# Patient Record
Sex: FEMALE | Race: WHITE | Employment: FULL TIME | URBAN - METROPOLITAN AREA
[De-identification: names, ages, dates, MRNs, and addresses within clinical notes are randomized per-mention and may not be internally consistent; named-entity substitution may affect disease eponyms.]

---

## 2017-05-02 ENCOUNTER — EMPLOYEE WELLNESS (OUTPATIENT)
Dept: OTHER | Age: 52
End: 2017-05-02

## 2017-05-02 LAB
CHOLESTEROL, TOTAL: 182 MG/DL (ref 0–199)
GLUCOSE BLD-MCNC: 73 MG/DL (ref 74–107)
HDLC SERPL-MCNC: 69 MG/DL
LDL CHOLESTEROL CALCULATED: 102 MG/DL (ref 0–99)
TRIGL SERPL-MCNC: 55 MG/DL (ref 0–149)

## 2018-03-20 VITALS — BODY MASS INDEX: 27.64 KG/M2 | WEIGHT: 161 LBS

## 2018-03-23 ENCOUNTER — HOSPITAL ENCOUNTER (OUTPATIENT)
Age: 53
Discharge: HOME | End: 2018-03-23
Payer: COMMERCIAL

## 2018-03-23 LAB
ALBUMIN SERPL-MCNC: 4.4 G/DL (ref 3.5–5.2)
ALP BLD-CCNC: 44 U/L (ref 35–104)
ALT SERPL-CCNC: 22 U/L (ref 0–32)
ANION GAP SERPL CALCULATED.3IONS-SCNC: 12 MMOL/L (ref 7–16)
AST SERPL-CCNC: 25 U/L (ref 0–31)
BASOPHILS ABSOLUTE: 0.07 E9/L (ref 0–0.2)
BASOPHILS RELATIVE PERCENT: 1.9 % (ref 0–2)
BILIRUB SERPL-MCNC: 0.4 MG/DL (ref 0–1.2)
BUN BLDV-MCNC: 14 MG/DL (ref 6–20)
CALCIUM SERPL-MCNC: 9.8 MG/DL (ref 8.6–10.2)
CHLORIDE BLD-SCNC: 102 MMOL/L (ref 98–107)
CHOLESTEROL, TOTAL: 179 MG/DL (ref 0–199)
CO2: 28 MMOL/L (ref 22–29)
CREAT SERPL-MCNC: 0.9 MG/DL (ref 0.5–1)
EOSINOPHILS ABSOLUTE: 0.27 E9/L (ref 0.05–0.5)
EOSINOPHILS RELATIVE PERCENT: 7.4 % (ref 0–6)
GFR AFRICAN AMERICAN: >60
GFR NON-AFRICAN AMERICAN: >60 ML/MIN/1.73
GLUCOSE BLD-MCNC: 90 MG/DL (ref 74–109)
HBA1C MFR BLD: 5 % (ref 4.8–5.9)
HCT VFR BLD CALC: 37.2 % (ref 34–48)
HDLC SERPL-MCNC: 72 MG/DL
HEMOGLOBIN: 12.4 G/DL (ref 11.5–15.5)
IMMATURE GRANULOCYTES #: 0.01 E9/L
IMMATURE GRANULOCYTES %: 0.3 % (ref 0–5)
LDL CHOLESTEROL CALCULATED: 98 MG/DL (ref 0–99)
LYMPHOCYTES ABSOLUTE: 1 E9/L (ref 1.5–4)
LYMPHOCYTES RELATIVE PERCENT: 27.2 % (ref 20–42)
MCH RBC QN AUTO: 32.2 PG (ref 26–35)
MCHC RBC AUTO-ENTMCNC: 33.3 % (ref 32–34.5)
MCV RBC AUTO: 96.6 FL (ref 80–99.9)
MONOCYTES ABSOLUTE: 0.32 E9/L (ref 0.1–0.95)
MONOCYTES RELATIVE PERCENT: 8.7 % (ref 2–12)
NEUTROPHILS ABSOLUTE: 2 E9/L (ref 1.8–7.3)
NEUTROPHILS RELATIVE PERCENT: 54.5 % (ref 43–80)
PDW BLD-RTO: 12.1 FL (ref 11.5–15)
PLATELET # BLD: 194 E9/L (ref 130–450)
PMV BLD AUTO: 8.9 FL (ref 7–12)
POTASSIUM SERPL-SCNC: 4.5 MMOL/L (ref 3.5–5)
RBC # BLD: 3.85 E12/L (ref 3.5–5.5)
SODIUM BLD-SCNC: 142 MMOL/L (ref 132–146)
T4 FREE: 1.3 NG/DL (ref 0.93–1.7)
TOTAL PROTEIN: 7.3 G/DL (ref 6.4–8.3)
TRIGL SERPL-MCNC: 46 MG/DL (ref 0–149)
TSH SERPL DL<=0.05 MIU/L-ACNC: 0.97 UIU/ML (ref 0.27–4.2)
VLDLC SERPL CALC-MCNC: 9 MG/DL
WBC # BLD: 3.7 E9/L (ref 4.5–11.5)

## 2018-03-23 PROCEDURE — 80061 LIPID PANEL: CPT

## 2018-03-23 PROCEDURE — 84439 ASSAY OF FREE THYROXINE: CPT

## 2018-03-23 PROCEDURE — 84443 ASSAY THYROID STIM HORMONE: CPT

## 2018-03-23 PROCEDURE — 80053 COMPREHEN METABOLIC PANEL: CPT

## 2018-03-23 PROCEDURE — 83036 HEMOGLOBIN GLYCOSYLATED A1C: CPT

## 2018-03-23 PROCEDURE — 85025 COMPLETE CBC W/AUTO DIFF WBC: CPT

## 2018-03-23 PROCEDURE — 36415 COLL VENOUS BLD VENIPUNCTURE: CPT

## 2018-03-27 ENCOUNTER — EMPLOYEE WELLNESS (OUTPATIENT)
Dept: OTHER | Age: 53
End: 2018-03-27

## 2018-03-27 LAB
CHOLESTEROL, TOTAL: 185 MG/DL (ref 0–199)
GLUCOSE BLD-MCNC: 59 MG/DL (ref 74–107)
HDLC SERPL-MCNC: 68 MG/DL
LDL CHOLESTEROL CALCULATED: 107 MG/DL (ref 0–99)
TRIGL SERPL-MCNC: 51 MG/DL (ref 0–149)

## 2018-04-02 VITALS — WEIGHT: 168 LBS | BODY MASS INDEX: 28.84 KG/M2

## 2018-04-06 ENCOUNTER — HOSPITAL ENCOUNTER (OUTPATIENT)
Age: 53
Discharge: HOME | End: 2018-04-08
Payer: COMMERCIAL

## 2018-04-06 PROCEDURE — 88305 TISSUE EXAM BY PATHOLOGIST: CPT

## 2018-06-07 ENCOUNTER — HOSPITAL ENCOUNTER (OUTPATIENT)
Age: 53
Discharge: HOME | End: 2018-06-07
Payer: COMMERCIAL

## 2018-06-07 LAB
ALBUMIN SERPL-MCNC: 4.2 G/DL (ref 3.5–5.2)
ALP BLD-CCNC: 44 U/L (ref 35–104)
ALT SERPL-CCNC: 25 U/L (ref 0–32)
AST SERPL-CCNC: 28 U/L (ref 0–31)
BILIRUB SERPL-MCNC: 0.5 MG/DL (ref 0–1.2)
BILIRUBIN DIRECT: 0.2 MG/DL (ref 0–0.3)
BILIRUBIN, INDIRECT: 0.3 MG/DL (ref 0–1)
TOTAL PROTEIN: 7 G/DL (ref 6.4–8.3)

## 2018-06-07 PROCEDURE — 36415 COLL VENOUS BLD VENIPUNCTURE: CPT

## 2018-06-07 PROCEDURE — 80076 HEPATIC FUNCTION PANEL: CPT

## 2018-06-12 ENCOUNTER — HOSPITAL ENCOUNTER (OUTPATIENT)
Dept: NUCLEAR MEDICINE | Age: 53
Discharge: HOME | End: 2018-06-14
Payer: COMMERCIAL

## 2018-06-12 VITALS — BODY MASS INDEX: 28.32 KG/M2 | WEIGHT: 165 LBS

## 2018-06-12 DIAGNOSIS — K44.9 HIATAL HERNIA: ICD-10-CM

## 2018-06-12 DIAGNOSIS — K21.9 GASTROESOPHAGEAL REFLUX DISEASE, ESOPHAGITIS PRESENCE NOT SPECIFIED: ICD-10-CM

## 2018-06-12 PROCEDURE — 2580000003 HC RX 258: Performed by: INTERNAL MEDICINE

## 2018-06-12 PROCEDURE — 3430000000 HC RX DIAGNOSTIC RADIOPHARMACEUTICAL: Performed by: RADIOLOGY

## 2018-06-12 PROCEDURE — 78227 HEPATOBIL SYST IMAGE W/DRUG: CPT

## 2018-06-12 PROCEDURE — 6360000004 HC RX CONTRAST MEDICATION: Performed by: INTERNAL MEDICINE

## 2018-06-12 PROCEDURE — A9537 TC99M MEBROFENIN: HCPCS | Performed by: RADIOLOGY

## 2018-06-12 RX ADMIN — MEBROFENIN 8.5 MILLICURIE: 45 INJECTION, POWDER, LYOPHILIZED, FOR SOLUTION INTRAVENOUS at 08:34

## 2018-06-12 RX ADMIN — SODIUM CHLORIDE 1.5 MCG: 9 INJECTION, SOLUTION INTRAVENOUS at 09:14

## 2018-06-29 RX ORDER — ESOMEPRAZOLE MAGNESIUM 40 MG/1
40 FOR SUSPENSION ORAL DAILY
COMMUNITY
End: 2019-07-16 | Stop reason: SDUPTHER

## 2018-07-11 ENCOUNTER — PREP FOR PROCEDURE (OUTPATIENT)
Dept: GASTROENTEROLOGY | Age: 53
End: 2018-07-11

## 2018-07-11 RX ORDER — 0.9 % SODIUM CHLORIDE 0.9 %
50 INTRAVENOUS SOLUTION INTRAVENOUS ONCE
Status: CANCELLED | OUTPATIENT
Start: 2018-07-11 | End: 2018-07-11

## 2018-07-13 ENCOUNTER — HOSPITAL ENCOUNTER (OUTPATIENT)
Age: 53
Setting detail: OUTPATIENT SURGERY
Discharge: HOME | End: 2018-07-13
Attending: INTERNAL MEDICINE | Admitting: INTERNAL MEDICINE
Payer: COMMERCIAL

## 2018-07-13 ENCOUNTER — ANESTHESIA (OUTPATIENT)
Dept: ENDOSCOPY | Age: 53
End: 2018-07-13
Payer: COMMERCIAL

## 2018-07-13 ENCOUNTER — ANESTHESIA EVENT (OUTPATIENT)
Dept: ENDOSCOPY | Age: 53
End: 2018-07-13
Payer: COMMERCIAL

## 2018-07-13 VITALS
OXYGEN SATURATION: 97 % | BODY MASS INDEX: 28 KG/M2 | DIASTOLIC BLOOD PRESSURE: 61 MMHG | WEIGHT: 164 LBS | HEIGHT: 64 IN | SYSTOLIC BLOOD PRESSURE: 107 MMHG | HEART RATE: 60 BPM | TEMPERATURE: 97.7 F | RESPIRATION RATE: 18 BRPM

## 2018-07-13 VITALS
OXYGEN SATURATION: 96 % | RESPIRATION RATE: 36 BRPM | DIASTOLIC BLOOD PRESSURE: 67 MMHG | SYSTOLIC BLOOD PRESSURE: 117 MMHG

## 2018-07-13 PROCEDURE — 3700000000 HC ANESTHESIA ATTENDED CARE: Performed by: INTERNAL MEDICINE

## 2018-07-13 PROCEDURE — 2580000003 HC RX 258: Performed by: INTERNAL MEDICINE

## 2018-07-13 PROCEDURE — 7100000010 HC PHASE II RECOVERY - FIRST 15 MIN: Performed by: INTERNAL MEDICINE

## 2018-07-13 PROCEDURE — 7100000011 HC PHASE II RECOVERY - ADDTL 15 MIN: Performed by: INTERNAL MEDICINE

## 2018-07-13 PROCEDURE — 3609012400 HC EGD TRANSORAL BIOPSY SINGLE/MULTIPLE: Performed by: INTERNAL MEDICINE

## 2018-07-13 PROCEDURE — 88305 TISSUE EXAM BY PATHOLOGIST: CPT

## 2018-07-13 PROCEDURE — 6360000002 HC RX W HCPCS: Performed by: NURSE ANESTHETIST, CERTIFIED REGISTERED

## 2018-07-13 PROCEDURE — 2580000003 HC RX 258: Performed by: NURSE ANESTHETIST, CERTIFIED REGISTERED

## 2018-07-13 PROCEDURE — 87081 CULTURE SCREEN ONLY: CPT

## 2018-07-13 PROCEDURE — 3700000001 HC ADD 15 MINUTES (ANESTHESIA): Performed by: INTERNAL MEDICINE

## 2018-07-13 RX ORDER — PROPOFOL 10 MG/ML
INJECTION, EMULSION INTRAVENOUS PRN
Status: DISCONTINUED | OUTPATIENT
Start: 2018-07-13 | End: 2018-07-13 | Stop reason: SDUPTHER

## 2018-07-13 RX ORDER — 0.9 % SODIUM CHLORIDE 0.9 %
10 VIAL (ML) INJECTION EVERY 12 HOURS SCHEDULED
Status: DISCONTINUED | OUTPATIENT
Start: 2018-07-13 | End: 2018-07-13 | Stop reason: HOSPADM

## 2018-07-13 RX ORDER — SODIUM CHLORIDE 9 MG/ML
INJECTION, SOLUTION INTRAVENOUS CONTINUOUS PRN
Status: DISCONTINUED | OUTPATIENT
Start: 2018-07-13 | End: 2018-07-13 | Stop reason: SDUPTHER

## 2018-07-13 RX ORDER — 0.9 % SODIUM CHLORIDE 0.9 %
50 INTRAVENOUS SOLUTION INTRAVENOUS ONCE
Status: COMPLETED | OUTPATIENT
Start: 2018-07-13 | End: 2018-07-13

## 2018-07-13 RX ORDER — 0.9 % SODIUM CHLORIDE 0.9 %
10 VIAL (ML) INJECTION PRN
Status: DISCONTINUED | OUTPATIENT
Start: 2018-07-13 | End: 2018-07-13 | Stop reason: HOSPADM

## 2018-07-13 RX ADMIN — PROPOFOL 120 MG: 10 INJECTION, EMULSION INTRAVENOUS at 13:56

## 2018-07-13 RX ADMIN — SODIUM CHLORIDE: 9 INJECTION, SOLUTION INTRAVENOUS at 13:48

## 2018-07-13 RX ADMIN — SODIUM CHLORIDE 50 ML: 9 INJECTION, SOLUTION INTRAVENOUS at 13:15

## 2018-07-13 ASSESSMENT — PAIN DESCRIPTION - ORIENTATION
ORIENTATION: INNER

## 2018-07-13 ASSESSMENT — PAIN DESCRIPTION - PAIN TYPE
TYPE: ACUTE PAIN

## 2018-07-13 ASSESSMENT — PAIN DESCRIPTION - LOCATION
LOCATION: ABDOMEN;THROAT
LOCATION: THROAT;ABDOMEN
LOCATION: ABDOMEN;THROAT

## 2018-07-13 ASSESSMENT — PAIN - FUNCTIONAL ASSESSMENT: PAIN_FUNCTIONAL_ASSESSMENT: 0-10

## 2018-07-13 ASSESSMENT — PAIN SCALES - GENERAL
PAINLEVEL_OUTOF10: 0

## 2018-07-14 LAB — CLOTEST: NORMAL

## 2018-07-14 NOTE — OP NOTE
procedure was  terminated by withdrawing the scope and conducting a second look on the way  out, which was essentially the same. The patient tolerated the procedure well.         Nereyda Hatfield MD    D: 07/13/2018 16:12:00       T: 07/13/2018 22:44:44     [id number]  Job#: [id number]     Doc#: [id number]    CC:  Tiffanie Jackson MD       [place]

## 2018-07-16 NOTE — H&P
Gastroenterology      Pre-operative History and Physical      HISTORY OF PRESENT ILLNESS:   Brandon Esparza is seen for a follow-up visit. Pt reports after eating certain foods she has epigastric abd pain that radiates across her upper and and into her back. Patient states when she eats certain foods, she is getting epigastric pain. States she is not eating fried foods, except for abraham. She states the pain does radiate through to her back. Reports last colonoscopy was in 2013, noted. States she is still currently gluten free, and states her nails & hair has improved since doing this. Plan of care reviewed with patient, all questions answered. Patient denies melena, hematochezia, hematemesis. HISTORY:   Past Medical History:   Diagnosis Date    Anemia     Anxiety     Celiac disease     Difficult intubation     Intubated successfully 1/2009 with Glidescope #3, 7.0 ETT.  Eating disorder 10 years ago    anorexia, Dr. Redmond Jarvis monitoring.  History of blood transfusion 1993.     Hypoglycemia     Hypothyroidism     Latex allergy, contact dermatitis     PONV (postoperative nausea and vomiting)     TMJ (dislocation of temporomandibular joint)     Vitamin D deficiency     follows with Dr. Carpenter Popheidy:    Family History   Problem Relation Age of Onset    High Blood Pressure Mother     High Cholesterol Mother     Diabetes Father     Obesity Father     Thyroid Disease Father     Stroke Father     High Cholesterol Father     High Blood Pressure Father     Heart Disease Father     Kidney Disease Father     Thyroid Disease Sister     Celiac Disease Sister     Thyroid Disease Brother     Diabetes Brother     High Blood Pressure Brother     Heart Disease Maternal Grandmother     High Blood Pressure Maternal Grandmother     Diabetes Maternal Grandmother     Diabetes Maternal Grandfather     High Blood Pressure Maternal Grandfather     Thyroid Disease Paternal Grandmother     Heart Disease Paternal Grandfather     Diabetes Maternal Aunt     Diabetes Maternal Uncle        MEDICATIONS:    Current Outpatient Prescriptions:     esomeprazole Magnesium (NEXIUM) 40 MG PACK, Take 40 mg by mouth daily Instructed to take am of procedure, Disp: , Rfl:     levothyroxine (SYNTHROID) 75 MCG tablet, Take 75 mcg by mouth Daily. , Disp: , Rfl:     Cholecalciferol (VITAMIN D-3) 5000 UNITS TABS, Take by mouth daily Instructed to hold, Disp: , Rfl:     FLUoxetine (PROZAC) 10 MG tablet, Take 10 mg by mouth every evening , Disp: , Rfl:     ALLERGIES:  Latex    PHYSICAL EXAM/GENERAL APPEARANCE:     Constitutional: Appearance: well-developed, appropriate grooming, in no acute distress. . Communication: conversation appropriate. Skin: Inspection: no rashes, ulcers, icterus or other lesions. Palpation: no induration or subcutaneous nodules. Head/face: Inspection: atraumatic and normocephalic. Eyes: Conjunctivae/lids: lids normal, anicteric sclerae, moist conjunctivae. Pupils/irises: PERRLA. ENMT: External: normal external inspection of the nose and ears, atraumautic. Hearing: within normal limits. Lips/teeth/gums: normal oral mucosa, lips and gums; good dentition, adequate oral hygiene without masses. Oropharynx: normal tongue, hard and soft palate; posterior pharynx without erythema, exudate or lesions. Neck: Neck: normal motion and central trachea. Thyroid: normal size, consistency and position; no masses or tenderness. Respiratory: Effort: normal respiratory effort and no intercostal retractions. Auscultation: normal breath sounds, no rubs, wheezes or rhonchi. Chest: Inspection: symetrical without visualized masses. Cardiovascular: Palpation: normal size, PMI is palpable in the 5th intercostal space, left midclavicular line, normal rythym. Auscultation: normal, S1 and S2, no gallops , no rubs or murmurs . Peripheral: no edema, varicosities or cyanosis.  Gastrointestinal/Abdomen: Abdomen: normal consistency, epigastric tenderness to palpitation with no guarding or rebound. No masses, normal bowel sounds. Liver/Spleen: normal, normal size, not palpable . Musculoskeletal: Gait/station: normal and should be adequate to undergo exercise testing and/or participation in exercise programs. Digits/nails: no clubbing, cyanosis, petechiae or other inflammatory conditions. Extremities: RLE: no cyanosis, clubbing, or edema. Normal peripheral pulses. Clerance Riis LLE: no cyanosis, clubbing or edema. Normal peripheral pulses. . Digits/Nails: no clubbing, cyanosis, inflammation, petechiae, ischemia, infection or Osler's nodes. Psychiatric: Judgment/insight: normal judgement, normal insight. Orientation: oriented to time, space and person. Memory: normal short term memory, normal long term memory, no memory loss. Mood and affect: no evidence of depression, anxiety or agitation.     OTHER SIGNIFICANT FINDINGS: None    IMPRESSION/INITIAL DIAGNOSIS:   GERD  Hemorrhoids  Hiatal hernia  Celiac disease - egd 4/12/17 - remission on gluten free diet  Epigastric pain          Electronically signed by Myriam Ku MD on 7/16/2018 at 8:17 AM

## 2018-07-18 NOTE — ANESTHESIA POSTPROCEDURE EVALUATION
Department of Anesthesiology  Postprocedure Note    Patient: Giovanni Hicks  MRN: [id number]  YOB: 1965  Date of evaluation: 7/18/2018  Time:  1:20 PM     Procedure Summary     Date:  07/13/18 Room / Location:  [facility] / [facility] ENDOSCOPY    Anesthesia Start:  1354 Anesthesia Stop:  8703    Procedure:  EGD BIOPSY (N/A ) Diagnosis:  (GERD,HIATAL HERNIA,CELIAC DISEASE, EPIGSTRIC PAIN)    Surgeon:  Claribel Woods MD Responsible Provider:  Wilder Cartagena MD    Anesthesia Type:  MAC ASA Status:  3          Anesthesia Type: MAC    Soni Phase I: Soni Score: 10    Soni Phase II: Soni Score: 10    Last vitals: Reviewed and per EMR flowsheets.        Anesthesia Post Evaluation    Patient location during evaluation: PACU  Patient participation: complete - patient participated  Level of consciousness: awake and alert  Airway patency: patent  Nausea & Vomiting: no vomiting and no nausea  Complications: no  Cardiovascular status: blood pressure returned to baseline  Respiratory status: acceptable  Hydration status: euvolemic

## 2018-10-05 ENCOUNTER — HOSPITAL ENCOUNTER (OUTPATIENT)
Age: 53
Discharge: HOME | End: 2018-10-07
Payer: COMMERCIAL

## 2018-10-05 PROCEDURE — 87624 HPV HI-RISK TYP POOLED RSLT: CPT

## 2018-10-05 PROCEDURE — 88175 CYTOPATH C/V AUTO FLUID REDO: CPT

## 2018-10-18 LAB
CORRESPONDING PAP CASE #: NORMAL
HPV, HIGH RISK: NEGATIVE

## 2018-10-22 ENCOUNTER — HOSPITAL ENCOUNTER (OUTPATIENT)
Dept: GENERAL RADIOLOGY | Age: 53
Discharge: HOME | End: 2018-10-24
Payer: COMMERCIAL

## 2018-10-22 DIAGNOSIS — Z12.31 VISIT FOR SCREENING MAMMOGRAM: ICD-10-CM

## 2018-10-22 PROCEDURE — 77063 BREAST TOMOSYNTHESIS BI: CPT

## 2018-10-26 ENCOUNTER — HOSPITAL ENCOUNTER (OUTPATIENT)
Age: 53
Discharge: HOME | End: 2018-10-26
Payer: COMMERCIAL

## 2018-10-26 LAB
ALBUMIN SERPL-MCNC: 4.2 G/DL (ref 3.5–5.2)
ALP BLD-CCNC: 50 U/L (ref 35–104)
ALT SERPL-CCNC: 28 U/L (ref 0–32)
ANION GAP SERPL CALCULATED.3IONS-SCNC: 10 MMOL/L (ref 7–16)
AST SERPL-CCNC: 29 U/L (ref 0–31)
BASOPHILS ABSOLUTE: 0.03 E9/L (ref 0–0.2)
BASOPHILS RELATIVE PERCENT: 0.8 % (ref 0–2)
BILIRUB SERPL-MCNC: 0.5 MG/DL (ref 0–1.2)
BUN BLDV-MCNC: 12 MG/DL (ref 6–20)
CALCIUM SERPL-MCNC: 9.6 MG/DL (ref 8.6–10.2)
CHLORIDE BLD-SCNC: 101 MMOL/L (ref 98–107)
CHOLESTEROL, TOTAL: 194 MG/DL (ref 0–199)
CO2: 27 MMOL/L (ref 22–29)
CREAT SERPL-MCNC: 0.9 MG/DL (ref 0.5–1)
EOSINOPHILS ABSOLUTE: 0.17 E9/L (ref 0.05–0.5)
EOSINOPHILS RELATIVE PERCENT: 4.5 % (ref 0–6)
GFR AFRICAN AMERICAN: >60
GFR NON-AFRICAN AMERICAN: >60 ML/MIN/1.73
GLUCOSE BLD-MCNC: 92 MG/DL (ref 74–109)
HCT VFR BLD CALC: 36 % (ref 34–48)
HDLC SERPL-MCNC: 71 MG/DL
HEMOGLOBIN: 11.7 G/DL (ref 11.5–15.5)
IMMATURE GRANULOCYTES #: 0.01 E9/L
IMMATURE GRANULOCYTES %: 0.3 % (ref 0–5)
LDL CHOLESTEROL CALCULATED: 112 MG/DL (ref 0–99)
LYMPHOCYTES ABSOLUTE: 0.91 E9/L (ref 1.5–4)
LYMPHOCYTES RELATIVE PERCENT: 24.3 % (ref 20–42)
MCH RBC QN AUTO: 31.5 PG (ref 26–35)
MCHC RBC AUTO-ENTMCNC: 32.5 % (ref 32–34.5)
MCV RBC AUTO: 97 FL (ref 80–99.9)
MONOCYTES ABSOLUTE: 0.34 E9/L (ref 0.1–0.95)
MONOCYTES RELATIVE PERCENT: 9.1 % (ref 2–12)
NEUTROPHILS ABSOLUTE: 2.28 E9/L (ref 1.8–7.3)
NEUTROPHILS RELATIVE PERCENT: 61 % (ref 43–80)
PDW BLD-RTO: 12.2 FL (ref 11.5–15)
PLATELET # BLD: 212 E9/L (ref 130–450)
PMV BLD AUTO: 9.3 FL (ref 7–12)
POTASSIUM SERPL-SCNC: 4.5 MMOL/L (ref 3.5–5)
RBC # BLD: 3.71 E12/L (ref 3.5–5.5)
SODIUM BLD-SCNC: 138 MMOL/L (ref 132–146)
T4 FREE: 1.42 NG/DL (ref 0.93–1.7)
TOTAL PROTEIN: 7 G/DL (ref 6.4–8.3)
TRIGL SERPL-MCNC: 56 MG/DL (ref 0–149)
TSH SERPL DL<=0.05 MIU/L-ACNC: 0.79 UIU/ML (ref 0.27–4.2)
VLDLC SERPL CALC-MCNC: 11 MG/DL
WBC # BLD: 3.7 E9/L (ref 4.5–11.5)

## 2018-10-26 PROCEDURE — 36415 COLL VENOUS BLD VENIPUNCTURE: CPT

## 2018-10-26 PROCEDURE — 80061 LIPID PANEL: CPT

## 2018-10-26 PROCEDURE — 85025 COMPLETE CBC W/AUTO DIFF WBC: CPT

## 2018-10-26 PROCEDURE — 84443 ASSAY THYROID STIM HORMONE: CPT

## 2018-10-26 PROCEDURE — 80053 COMPREHEN METABOLIC PANEL: CPT

## 2018-10-26 PROCEDURE — 84439 ASSAY OF FREE THYROXINE: CPT

## 2019-01-16 ENCOUNTER — APPOINTMENT (OUTPATIENT)
Dept: GENERAL RADIOLOGY | Age: 54
End: 2019-01-16
Payer: COMMERCIAL

## 2019-01-16 ENCOUNTER — ANESTHESIA (OUTPATIENT)
Dept: OPERATING ROOM | Age: 54
End: 2019-01-16
Payer: COMMERCIAL

## 2019-01-16 ENCOUNTER — HOSPITAL ENCOUNTER (EMERGENCY)
Age: 54
Discharge: HOME | End: 2019-01-16
Attending: EMERGENCY MEDICINE
Payer: COMMERCIAL

## 2019-01-16 ENCOUNTER — ANESTHESIA EVENT (OUTPATIENT)
Dept: OPERATING ROOM | Age: 54
End: 2019-01-16
Payer: COMMERCIAL

## 2019-01-16 VITALS
HEIGHT: 64 IN | TEMPERATURE: 97.8 F | HEART RATE: 68 BPM | OXYGEN SATURATION: 100 % | RESPIRATION RATE: 18 BRPM | WEIGHT: 161 LBS | SYSTOLIC BLOOD PRESSURE: 121 MMHG | DIASTOLIC BLOOD PRESSURE: 64 MMHG | BODY MASS INDEX: 27.49 KG/M2

## 2019-01-16 VITALS
TEMPERATURE: 97.2 F | RESPIRATION RATE: 11 BRPM | SYSTOLIC BLOOD PRESSURE: 128 MMHG | DIASTOLIC BLOOD PRESSURE: 71 MMHG | OXYGEN SATURATION: 100 %

## 2019-01-16 DIAGNOSIS — S62.102A CLOSED FRACTURE OF LEFT WRIST, INITIAL ENCOUNTER: Primary | ICD-10-CM

## 2019-01-16 LAB
ABO/RH: NORMAL
ALBUMIN SERPL-MCNC: 4.6 G/DL (ref 3.5–5.2)
ALP BLD-CCNC: 47 U/L (ref 35–104)
ALT SERPL-CCNC: 12 U/L (ref 0–32)
ANION GAP SERPL CALCULATED.3IONS-SCNC: 13 MMOL/L (ref 7–16)
ANTIBODY SCREEN: NORMAL
APTT: 27.9 SEC (ref 24.5–35.1)
AST SERPL-CCNC: 23 U/L (ref 0–31)
BILIRUB SERPL-MCNC: 0.5 MG/DL (ref 0–1.2)
BUN BLDV-MCNC: 13 MG/DL (ref 6–20)
CALCIUM SERPL-MCNC: 10.1 MG/DL (ref 8.6–10.2)
CHLORIDE BLD-SCNC: 101 MMOL/L (ref 98–107)
CO2: 26 MMOL/L (ref 22–29)
CREAT SERPL-MCNC: 0.8 MG/DL (ref 0.5–1)
EKG ATRIAL RATE: 64 BPM
EKG P AXIS: 54 DEGREES
EKG P-R INTERVAL: 162 MS
EKG Q-T INTERVAL: 414 MS
EKG QRS DURATION: 90 MS
EKG QTC CALCULATION (BAZETT): 427 MS
EKG R AXIS: 15 DEGREES
EKG T AXIS: 52 DEGREES
EKG VENTRICULAR RATE: 64 BPM
GFR AFRICAN AMERICAN: >60
GFR NON-AFRICAN AMERICAN: >60 ML/MIN/1.73
GLUCOSE BLD-MCNC: 101 MG/DL (ref 74–99)
HCT VFR BLD CALC: 36.3 % (ref 34–48)
HEMOGLOBIN: 12.1 G/DL (ref 11.5–15.5)
INR BLD: 1
MCH RBC QN AUTO: 32.1 PG (ref 26–35)
MCHC RBC AUTO-ENTMCNC: 33.3 % (ref 32–34.5)
MCV RBC AUTO: 96.3 FL (ref 80–99.9)
PDW BLD-RTO: 12 FL (ref 11.5–15)
PLATELET # BLD: 218 E9/L (ref 130–450)
PMV BLD AUTO: 9.2 FL (ref 7–12)
POTASSIUM SERPL-SCNC: 4.4 MMOL/L (ref 3.5–5)
PROTHROMBIN TIME: 11.9 SEC (ref 9.3–12.4)
RBC # BLD: 3.77 E12/L (ref 3.5–5.5)
SODIUM BLD-SCNC: 140 MMOL/L (ref 132–146)
TOTAL PROTEIN: 7.2 G/DL (ref 6.4–8.3)
WBC # BLD: 6.5 E9/L (ref 4.5–11.5)

## 2019-01-16 PROCEDURE — 2580000003 HC RX 258

## 2019-01-16 PROCEDURE — 3209999900 FLUORO FOR SURGICAL PROCEDURES

## 2019-01-16 PROCEDURE — 85610 PROTHROMBIN TIME: CPT

## 2019-01-16 PROCEDURE — 85027 COMPLETE CBC AUTOMATED: CPT

## 2019-01-16 PROCEDURE — 6370000000 HC RX 637 (ALT 250 FOR IP): Performed by: PHYSICIAN ASSISTANT

## 2019-01-16 PROCEDURE — 6370000000 HC RX 637 (ALT 250 FOR IP): Performed by: ORTHOPAEDIC SURGERY

## 2019-01-16 PROCEDURE — 36415 COLL VENOUS BLD VENIPUNCTURE: CPT

## 2019-01-16 PROCEDURE — 86901 BLOOD TYPING SEROLOGIC RH(D): CPT

## 2019-01-16 PROCEDURE — 2720000010 HC SURG SUPPLY STERILE: Performed by: ORTHOPAEDIC SURGERY

## 2019-01-16 PROCEDURE — 3700000001 HC ADD 15 MINUTES (ANESTHESIA): Performed by: ORTHOPAEDIC SURGERY

## 2019-01-16 PROCEDURE — 2709999900 HC NON-CHARGEABLE SUPPLY: Performed by: ORTHOPAEDIC SURGERY

## 2019-01-16 PROCEDURE — 93005 ELECTROCARDIOGRAM TRACING: CPT | Performed by: STUDENT IN AN ORGANIZED HEALTH CARE EDUCATION/TRAINING PROGRAM

## 2019-01-16 PROCEDURE — 80053 COMPREHEN METABOLIC PANEL: CPT

## 2019-01-16 PROCEDURE — 99284 EMERGENCY DEPT VISIT MOD MDM: CPT

## 2019-01-16 PROCEDURE — 71045 X-RAY EXAM CHEST 1 VIEW: CPT

## 2019-01-16 PROCEDURE — 2500000003 HC RX 250 WO HCPCS

## 2019-01-16 PROCEDURE — 3600000014 HC SURGERY LEVEL 4 ADDTL 15MIN: Performed by: ORTHOPAEDIC SURGERY

## 2019-01-16 PROCEDURE — 3600000004 HC SURGERY LEVEL 4 BASE: Performed by: ORTHOPAEDIC SURGERY

## 2019-01-16 PROCEDURE — 86850 RBC ANTIBODY SCREEN: CPT

## 2019-01-16 PROCEDURE — 7100000010 HC PHASE II RECOVERY - FIRST 15 MIN: Performed by: ORTHOPAEDIC SURGERY

## 2019-01-16 PROCEDURE — 85730 THROMBOPLASTIN TIME PARTIAL: CPT

## 2019-01-16 PROCEDURE — 6360000002 HC RX W HCPCS: Performed by: ANESTHESIOLOGY

## 2019-01-16 PROCEDURE — 6360000002 HC RX W HCPCS: Performed by: PHYSICIAN ASSISTANT

## 2019-01-16 PROCEDURE — 99285 EMERGENCY DEPT VISIT HI MDM: CPT | Performed by: ORTHOPAEDIC SURGERY

## 2019-01-16 PROCEDURE — 6360000002 HC RX W HCPCS

## 2019-01-16 PROCEDURE — 7100000001 HC PACU RECOVERY - ADDTL 15 MIN: Performed by: ORTHOPAEDIC SURGERY

## 2019-01-16 PROCEDURE — 73110 X-RAY EXAM OF WRIST: CPT

## 2019-01-16 PROCEDURE — 3700000000 HC ANESTHESIA ATTENDED CARE: Performed by: ORTHOPAEDIC SURGERY

## 2019-01-16 PROCEDURE — 6360000002 HC RX W HCPCS: Performed by: STUDENT IN AN ORGANIZED HEALTH CARE EDUCATION/TRAINING PROGRAM

## 2019-01-16 PROCEDURE — C1713 ANCHOR/SCREW BN/BN,TIS/BN: HCPCS | Performed by: ORTHOPAEDIC SURGERY

## 2019-01-16 PROCEDURE — 7100000011 HC PHASE II RECOVERY - ADDTL 15 MIN: Performed by: ORTHOPAEDIC SURGERY

## 2019-01-16 PROCEDURE — 7100000000 HC PACU RECOVERY - FIRST 15 MIN: Performed by: ORTHOPAEDIC SURGERY

## 2019-01-16 PROCEDURE — 86900 BLOOD TYPING SEROLOGIC ABO: CPT

## 2019-01-16 DEVICE — SCREW BNE L12MM DIA2.7MM CORT S STL ST T8 STARDRV RECESS: Type: IMPLANTABLE DEVICE | Site: WRIST | Status: FUNCTIONAL

## 2019-01-16 DEVICE — SCREW BNE L14MM DIA2.7MM CORT S STL ST T8 STARDRV RECESS: Type: IMPLANTABLE DEVICE | Site: WRIST | Status: FUNCTIONAL

## 2019-01-16 DEVICE — SCREW BNE L16MM DIA2.4MM DST RAD VOLAR S STL ST VAR ANG LOK: Type: IMPLANTABLE DEVICE | Site: WRIST | Status: FUNCTIONAL

## 2019-01-16 DEVICE — PLATE DISTL RAD VAR LT 2.4X54MM: Type: IMPLANTABLE DEVICE | Site: WRIST | Status: FUNCTIONAL

## 2019-01-16 DEVICE — SCREW BNE L20MM DIA2.4MM DST RAD VOLAR S STL ST VAR ANG LOK: Type: IMPLANTABLE DEVICE | Site: WRIST | Status: FUNCTIONAL

## 2019-01-16 RX ORDER — NEOSTIGMINE METHYLSULFATE 1 MG/ML
INJECTION, SOLUTION INTRAVENOUS PRN
Status: DISCONTINUED | OUTPATIENT
Start: 2019-01-16 | End: 2019-01-16 | Stop reason: SDUPTHER

## 2019-01-16 RX ORDER — PROMETHAZINE HYDROCHLORIDE 25 MG/ML
6.25 INJECTION, SOLUTION INTRAMUSCULAR; INTRAVENOUS EVERY 10 MIN PRN
Status: DISCONTINUED | OUTPATIENT
Start: 2019-01-16 | End: 2019-01-31 | Stop reason: HOSPADM

## 2019-01-16 RX ORDER — LIDOCAINE HYDROCHLORIDE 20 MG/ML
INJECTION, SOLUTION INFILTRATION; PERINEURAL PRN
Status: DISCONTINUED | OUTPATIENT
Start: 2019-01-16 | End: 2019-01-16 | Stop reason: SDUPTHER

## 2019-01-16 RX ORDER — MEPERIDINE HYDROCHLORIDE 50 MG/ML
12.5 INJECTION INTRAMUSCULAR; INTRAVENOUS; SUBCUTANEOUS EVERY 5 MIN PRN
Status: DISCONTINUED | OUTPATIENT
Start: 2019-01-16 | End: 2019-01-31 | Stop reason: HOSPADM

## 2019-01-16 RX ORDER — ROCURONIUM BROMIDE 10 MG/ML
INJECTION, SOLUTION INTRAVENOUS PRN
Status: DISCONTINUED | OUTPATIENT
Start: 2019-01-16 | End: 2019-01-16 | Stop reason: SDUPTHER

## 2019-01-16 RX ORDER — ROPIVACAINE HYDROCHLORIDE 5 MG/ML
30 INJECTION, SOLUTION EPIDURAL; INFILTRATION; PERINEURAL ONCE
Status: DISCONTINUED | OUTPATIENT
Start: 2019-01-16 | End: 2019-01-31 | Stop reason: HOSPADM

## 2019-01-16 RX ORDER — ONDANSETRON 2 MG/ML
4 INJECTION INTRAMUSCULAR; INTRAVENOUS ONCE
Status: COMPLETED | OUTPATIENT
Start: 2019-01-16 | End: 2019-01-16

## 2019-01-16 RX ORDER — FENTANYL CITRATE 50 UG/ML
INJECTION, SOLUTION INTRAMUSCULAR; INTRAVENOUS PRN
Status: DISCONTINUED | OUTPATIENT
Start: 2019-01-16 | End: 2019-01-16 | Stop reason: SDUPTHER

## 2019-01-16 RX ORDER — MORPHINE SULFATE 2 MG/ML
1 INJECTION, SOLUTION INTRAMUSCULAR; INTRAVENOUS EVERY 5 MIN PRN
Status: DISCONTINUED | OUTPATIENT
Start: 2019-01-16 | End: 2019-01-31 | Stop reason: HOSPADM

## 2019-01-16 RX ORDER — SODIUM CHLORIDE, SODIUM LACTATE, POTASSIUM CHLORIDE, CALCIUM CHLORIDE 600; 310; 30; 20 MG/100ML; MG/100ML; MG/100ML; MG/100ML
INJECTION, SOLUTION INTRAVENOUS CONTINUOUS PRN
Status: DISCONTINUED | OUTPATIENT
Start: 2019-01-16 | End: 2019-01-16 | Stop reason: SDUPTHER

## 2019-01-16 RX ORDER — OXYCODONE HYDROCHLORIDE AND ACETAMINOPHEN 5; 325 MG/1; MG/1
1 TABLET ORAL
Status: COMPLETED | OUTPATIENT
Start: 2019-01-16 | End: 2019-01-16

## 2019-01-16 RX ORDER — ONDANSETRON 2 MG/ML
INJECTION INTRAMUSCULAR; INTRAVENOUS
Status: DISPENSED
Start: 2019-01-16 | End: 2019-01-17

## 2019-01-16 RX ORDER — ONDANSETRON 4 MG/1
4 TABLET, ORALLY DISINTEGRATING ORAL ONCE
Status: COMPLETED | OUTPATIENT
Start: 2019-01-16 | End: 2019-01-16

## 2019-01-16 RX ORDER — MIDAZOLAM HYDROCHLORIDE 1 MG/ML
2 INJECTION INTRAMUSCULAR; INTRAVENOUS ONCE
Status: DISCONTINUED | OUTPATIENT
Start: 2019-01-16 | End: 2019-01-31 | Stop reason: HOSPADM

## 2019-01-16 RX ORDER — MORPHINE SULFATE 2 MG/ML
2 INJECTION, SOLUTION INTRAMUSCULAR; INTRAVENOUS EVERY 5 MIN PRN
Status: COMPLETED | OUTPATIENT
Start: 2019-01-16 | End: 2019-01-16

## 2019-01-16 RX ORDER — FENTANYL CITRATE 50 UG/ML
100 INJECTION, SOLUTION INTRAMUSCULAR; INTRAVENOUS ONCE
Status: COMPLETED | OUTPATIENT
Start: 2019-01-16 | End: 2019-01-16

## 2019-01-16 RX ORDER — HYDROCODONE BITARTRATE AND ACETAMINOPHEN 5; 325 MG/1; MG/1
2 TABLET ORAL ONCE
Status: DISCONTINUED | OUTPATIENT
Start: 2019-01-16 | End: 2019-01-31 | Stop reason: HOSPADM

## 2019-01-16 RX ORDER — DEXAMETHASONE SODIUM PHOSPHATE 10 MG/ML
INJECTION, SOLUTION INTRAMUSCULAR; INTRAVENOUS PRN
Status: DISCONTINUED | OUTPATIENT
Start: 2019-01-16 | End: 2019-01-16 | Stop reason: SDUPTHER

## 2019-01-16 RX ORDER — HYDROCODONE BITARTRATE AND ACETAMINOPHEN 5; 325 MG/1; MG/1
2 TABLET ORAL PRN
Status: ACTIVE | OUTPATIENT
Start: 2019-01-16 | End: 2019-01-16

## 2019-01-16 RX ORDER — OXYCODONE HYDROCHLORIDE AND ACETAMINOPHEN 5; 325 MG/1; MG/1
TABLET ORAL
Status: DISPENSED
Start: 2019-01-16 | End: 2019-01-17

## 2019-01-16 RX ORDER — MIDAZOLAM HYDROCHLORIDE 1 MG/ML
INJECTION INTRAMUSCULAR; INTRAVENOUS PRN
Status: DISCONTINUED | OUTPATIENT
Start: 2019-01-16 | End: 2019-01-16 | Stop reason: SDUPTHER

## 2019-01-16 RX ORDER — FENTANYL CITRATE 50 UG/ML
25 INJECTION, SOLUTION INTRAMUSCULAR; INTRAVENOUS ONCE
Status: DISCONTINUED | OUTPATIENT
Start: 2019-01-16 | End: 2019-01-31 | Stop reason: HOSPADM

## 2019-01-16 RX ORDER — IBUPROFEN 800 MG/1
800 TABLET ORAL ONCE
Status: COMPLETED | OUTPATIENT
Start: 2019-01-16 | End: 2019-01-16

## 2019-01-16 RX ORDER — ONDANSETRON 2 MG/ML
INJECTION INTRAMUSCULAR; INTRAVENOUS PRN
Status: DISCONTINUED | OUTPATIENT
Start: 2019-01-16 | End: 2019-01-16 | Stop reason: SDUPTHER

## 2019-01-16 RX ORDER — OXYCODONE HYDROCHLORIDE AND ACETAMINOPHEN 5; 325 MG/1; MG/1
1 TABLET ORAL EVERY 6 HOURS PRN
Qty: 20 TABLET | Refills: 0 | Status: SHIPPED | OUTPATIENT
Start: 2019-01-16 | End: 2019-01-23

## 2019-01-16 RX ORDER — HYDROCODONE BITARTRATE AND ACETAMINOPHEN 5; 325 MG/1; MG/1
1 TABLET ORAL PRN
Status: ACTIVE | OUTPATIENT
Start: 2019-01-16 | End: 2019-01-16

## 2019-01-16 RX ORDER — GLYCOPYRROLATE 1 MG/5 ML
SYRINGE (ML) INTRAVENOUS PRN
Status: DISCONTINUED | OUTPATIENT
Start: 2019-01-16 | End: 2019-01-16 | Stop reason: SDUPTHER

## 2019-01-16 RX ORDER — LIDOCAINE HYDROCHLORIDE 10 MG/ML
20 INJECTION, SOLUTION INFILTRATION; PERINEURAL ONCE
Status: DISCONTINUED | OUTPATIENT
Start: 2019-01-16 | End: 2019-01-31 | Stop reason: HOSPADM

## 2019-01-16 RX ORDER — PROPOFOL 10 MG/ML
INJECTION, EMULSION INTRAVENOUS PRN
Status: DISCONTINUED | OUTPATIENT
Start: 2019-01-16 | End: 2019-01-16 | Stop reason: SDUPTHER

## 2019-01-16 RX ADMIN — ROCURONIUM BROMIDE 40 MG: 10 INJECTION INTRAVENOUS at 13:28

## 2019-01-16 RX ADMIN — ONDANSETRON 4 MG: 4 TABLET, ORALLY DISINTEGRATING ORAL at 08:12

## 2019-01-16 RX ADMIN — MORPHINE SULFATE 2 MG: 2 INJECTION, SOLUTION INTRAMUSCULAR; INTRAVENOUS at 16:02

## 2019-01-16 RX ADMIN — FENTANYL CITRATE 50 MCG: 50 INJECTION, SOLUTION INTRAMUSCULAR; INTRAVENOUS at 14:07

## 2019-01-16 RX ADMIN — FENTANYL CITRATE 50 MCG: 50 INJECTION, SOLUTION INTRAMUSCULAR; INTRAVENOUS at 13:28

## 2019-01-16 RX ADMIN — ONDANSETRON HYDROCHLORIDE 4 MG: 2 INJECTION, SOLUTION INTRAMUSCULAR; INTRAVENOUS at 14:14

## 2019-01-16 RX ADMIN — MORPHINE SULFATE 2 MG: 2 INJECTION, SOLUTION INTRAMUSCULAR; INTRAVENOUS at 15:35

## 2019-01-16 RX ADMIN — FENTANYL CITRATE 100 MCG: 50 INJECTION, SOLUTION INTRAMUSCULAR; INTRAVENOUS at 12:43

## 2019-01-16 RX ADMIN — MIDAZOLAM HYDROCHLORIDE 2 MG: 1 INJECTION, SOLUTION INTRAMUSCULAR; INTRAVENOUS at 13:20

## 2019-01-16 RX ADMIN — OXYCODONE AND ACETAMINOPHEN 1 TABLET: 5; 325 TABLET ORAL at 16:37

## 2019-01-16 RX ADMIN — Medication 2 G: at 13:20

## 2019-01-16 RX ADMIN — ONDANSETRON 4 MG: 2 INJECTION INTRAMUSCULAR; INTRAVENOUS at 17:23

## 2019-01-16 RX ADMIN — DEXAMETHASONE SODIUM PHOSPHATE 10 MG: 10 INJECTION INTRAMUSCULAR; INTRAVENOUS at 13:35

## 2019-01-16 RX ADMIN — Medication 0.6 MG: at 14:22

## 2019-01-16 RX ADMIN — MORPHINE SULFATE 2 MG: 2 INJECTION, SOLUTION INTRAMUSCULAR; INTRAVENOUS at 15:45

## 2019-01-16 RX ADMIN — PROPOFOL 100 MG: 10 INJECTION, EMULSION INTRAVENOUS at 13:28

## 2019-01-16 RX ADMIN — MORPHINE SULFATE 2 MG: 2 INJECTION, SOLUTION INTRAMUSCULAR; INTRAVENOUS at 15:10

## 2019-01-16 RX ADMIN — LIDOCAINE HYDROCHLORIDE 60 MG: 20 INJECTION, SOLUTION INFILTRATION; PERINEURAL at 13:28

## 2019-01-16 RX ADMIN — IBUPROFEN 800 MG: 800 TABLET ORAL at 08:09

## 2019-01-16 RX ADMIN — SODIUM CHLORIDE, POTASSIUM CHLORIDE, SODIUM LACTATE AND CALCIUM CHLORIDE: 600; 310; 30; 20 INJECTION, SOLUTION INTRAVENOUS at 13:20

## 2019-01-16 RX ADMIN — Medication 3 MG: at 14:22

## 2019-01-16 RX ADMIN — MORPHINE SULFATE 2 MG: 2 INJECTION, SOLUTION INTRAMUSCULAR; INTRAVENOUS at 15:20

## 2019-01-16 ASSESSMENT — PULMONARY FUNCTION TESTS
PIF_VALUE: 24
PIF_VALUE: 23
PIF_VALUE: 24
PIF_VALUE: 23
PIF_VALUE: 23
PIF_VALUE: 20
PIF_VALUE: 24
PIF_VALUE: 24
PIF_VALUE: 17
PIF_VALUE: 20
PIF_VALUE: 1
PIF_VALUE: 23
PIF_VALUE: 22
PIF_VALUE: 25
PIF_VALUE: 3
PIF_VALUE: 23
PIF_VALUE: 23
PIF_VALUE: 0
PIF_VALUE: 23
PIF_VALUE: 23
PIF_VALUE: 3
PIF_VALUE: 23
PIF_VALUE: 25
PIF_VALUE: 24
PIF_VALUE: 3
PIF_VALUE: 23
PIF_VALUE: 20
PIF_VALUE: 23
PIF_VALUE: 23
PIF_VALUE: 7
PIF_VALUE: 23
PIF_VALUE: 2
PIF_VALUE: 13
PIF_VALUE: 22
PIF_VALUE: 4
PIF_VALUE: 7
PIF_VALUE: 3
PIF_VALUE: 23
PIF_VALUE: 0
PIF_VALUE: 23
PIF_VALUE: 17
PIF_VALUE: 20
PIF_VALUE: 19
PIF_VALUE: 22
PIF_VALUE: 0
PIF_VALUE: 23
PIF_VALUE: 18
PIF_VALUE: 0
PIF_VALUE: 19
PIF_VALUE: 23
PIF_VALUE: 19
PIF_VALUE: 23
PIF_VALUE: 1
PIF_VALUE: 0
PIF_VALUE: 18
PIF_VALUE: 19
PIF_VALUE: 2
PIF_VALUE: 3
PIF_VALUE: 22
PIF_VALUE: 20
PIF_VALUE: 23
PIF_VALUE: 2
PIF_VALUE: 22
PIF_VALUE: 1
PIF_VALUE: 1
PIF_VALUE: 23
PIF_VALUE: 29
PIF_VALUE: 19
PIF_VALUE: 24
PIF_VALUE: 23
PIF_VALUE: 22

## 2019-01-16 ASSESSMENT — PAIN SCALES - GENERAL
PAINLEVEL_OUTOF10: 10
PAINLEVEL_OUTOF10: 0
PAINLEVEL_OUTOF10: 7
PAINLEVEL_OUTOF10: 0
PAINLEVEL_OUTOF10: 5
PAINLEVEL_OUTOF10: 5
PAINLEVEL_OUTOF10: 10
PAINLEVEL_OUTOF10: 6
PAINLEVEL_OUTOF10: 7
PAINLEVEL_OUTOF10: 10
PAINLEVEL_OUTOF10: 0
PAINLEVEL_OUTOF10: 4
PAINLEVEL_OUTOF10: 10

## 2019-01-16 ASSESSMENT — PAIN DESCRIPTION - DESCRIPTORS
DESCRIPTORS: CONSTANT;ACHING
DESCRIPTORS: ACHING;BURNING;CONSTANT
DESCRIPTORS: ACHING
DESCRIPTORS: ACHING;BURNING;CONSTANT

## 2019-01-16 ASSESSMENT — PAIN DESCRIPTION - ORIENTATION
ORIENTATION: LEFT

## 2019-01-16 ASSESSMENT — PAIN DESCRIPTION - LOCATION
LOCATION: WRIST
LOCATION: ARM
LOCATION: WRIST
LOCATION: WRIST
LOCATION: HAND
LOCATION: WRIST

## 2019-01-16 ASSESSMENT — PAIN DESCRIPTION - FREQUENCY
FREQUENCY: CONTINUOUS

## 2019-01-16 ASSESSMENT — PAIN DESCRIPTION - ONSET
ONSET: ON-GOING
ONSET: ON-GOING

## 2019-01-16 ASSESSMENT — PAIN DESCRIPTION - PROGRESSION
CLINICAL_PROGRESSION: GRADUALLY IMPROVING
CLINICAL_PROGRESSION: GRADUALLY WORSENING
CLINICAL_PROGRESSION: GRADUALLY IMPROVING

## 2019-01-16 ASSESSMENT — PAIN DESCRIPTION - PAIN TYPE
TYPE: SURGICAL PAIN
TYPE: ACUTE PAIN
TYPE: SURGICAL PAIN

## 2019-01-16 ASSESSMENT — LIFESTYLE VARIABLES: SMOKING_STATUS: 0

## 2019-01-17 ENCOUNTER — TELEPHONE (OUTPATIENT)
Dept: ADMINISTRATIVE | Age: 54
End: 2019-01-17

## 2019-01-17 PROCEDURE — 25609 OPTX DST RD XART FX/EP SEP3+: CPT | Performed by: ORTHOPAEDIC SURGERY

## 2019-01-24 ENCOUNTER — TELEPHONE (OUTPATIENT)
Dept: ORTHOPEDIC SURGERY | Age: 54
End: 2019-01-24

## 2019-01-29 DIAGNOSIS — S52.532D CLOSED COLLES' FRACTURE OF LEFT RADIUS WITH ROUTINE HEALING, SUBSEQUENT ENCOUNTER: Primary | ICD-10-CM

## 2019-01-31 ENCOUNTER — OFFICE VISIT (OUTPATIENT)
Dept: ORTHOPEDIC SURGERY | Age: 54
End: 2019-01-31
Payer: COMMERCIAL

## 2019-01-31 ENCOUNTER — HOSPITAL ENCOUNTER (OUTPATIENT)
Dept: GENERAL RADIOLOGY | Age: 54
Discharge: HOME | End: 2019-02-02
Payer: COMMERCIAL

## 2019-01-31 VITALS
BODY MASS INDEX: 26.98 KG/M2 | DIASTOLIC BLOOD PRESSURE: 67 MMHG | HEIGHT: 64 IN | SYSTOLIC BLOOD PRESSURE: 108 MMHG | WEIGHT: 158 LBS | HEART RATE: 74 BPM

## 2019-01-31 DIAGNOSIS — S52.532D CLOSED COLLES' FRACTURE OF LEFT RADIUS WITH ROUTINE HEALING, SUBSEQUENT ENCOUNTER: Primary | ICD-10-CM

## 2019-01-31 DIAGNOSIS — S52.532D CLOSED COLLES' FRACTURE OF LEFT RADIUS WITH ROUTINE HEALING, SUBSEQUENT ENCOUNTER: ICD-10-CM

## 2019-01-31 PROCEDURE — 73110 X-RAY EXAM OF WRIST: CPT

## 2019-01-31 PROCEDURE — 99213 OFFICE O/P EST LOW 20 MIN: CPT | Performed by: PHYSICIAN ASSISTANT

## 2019-01-31 PROCEDURE — 99024 POSTOP FOLLOW-UP VISIT: CPT | Performed by: PHYSICIAN ASSISTANT

## 2019-01-31 RX ORDER — PSEUDOEPHEDRINE HCL 30 MG
250 TABLET ORAL 2 TIMES DAILY
Qty: 60 TABLET | Refills: 2 | Status: SHIPPED
Start: 2019-01-31 | End: 2022-06-17

## 2019-02-06 ENCOUNTER — TELEPHONE (OUTPATIENT)
Dept: ORTHOPEDIC SURGERY | Age: 54
End: 2019-02-06

## 2019-02-07 ENCOUNTER — HOSPITAL ENCOUNTER (OUTPATIENT)
Dept: OCCUPATIONAL THERAPY | Age: 54
Setting detail: THERAPIES SERIES
Discharge: HOME | End: 2019-02-07
Payer: COMMERCIAL

## 2019-02-07 ENCOUNTER — TELEPHONE (OUTPATIENT)
Dept: ORTHOPEDIC SURGERY | Age: 54
End: 2019-02-07

## 2019-02-07 PROCEDURE — 97165 OT EVAL LOW COMPLEX 30 MIN: CPT | Performed by: OCCUPATIONAL THERAPIST

## 2019-02-07 PROCEDURE — 97022 WHIRLPOOL THERAPY: CPT | Performed by: OCCUPATIONAL THERAPIST

## 2019-02-07 PROCEDURE — 97140 MANUAL THERAPY 1/> REGIONS: CPT | Performed by: OCCUPATIONAL THERAPIST

## 2019-02-07 PROCEDURE — 97110 THERAPEUTIC EXERCISES: CPT | Performed by: OCCUPATIONAL THERAPIST

## 2019-02-12 ENCOUNTER — HOSPITAL ENCOUNTER (OUTPATIENT)
Dept: OCCUPATIONAL THERAPY | Age: 54
Setting detail: THERAPIES SERIES
End: 2019-02-12
Payer: COMMERCIAL

## 2019-02-14 ENCOUNTER — HOSPITAL ENCOUNTER (OUTPATIENT)
Dept: OCCUPATIONAL THERAPY | Age: 54
Setting detail: THERAPIES SERIES
Discharge: HOME | End: 2019-02-14
Payer: COMMERCIAL

## 2019-02-14 PROCEDURE — 97140 MANUAL THERAPY 1/> REGIONS: CPT

## 2019-02-14 PROCEDURE — 97530 THERAPEUTIC ACTIVITIES: CPT

## 2019-02-14 PROCEDURE — 97022 WHIRLPOOL THERAPY: CPT

## 2019-02-14 PROCEDURE — 97110 THERAPEUTIC EXERCISES: CPT

## 2019-02-19 ENCOUNTER — HOSPITAL ENCOUNTER (OUTPATIENT)
Dept: OCCUPATIONAL THERAPY | Age: 54
Setting detail: THERAPIES SERIES
Discharge: HOME | End: 2019-02-19
Payer: COMMERCIAL

## 2019-02-19 PROCEDURE — 97022 WHIRLPOOL THERAPY: CPT

## 2019-02-19 PROCEDURE — 97140 MANUAL THERAPY 1/> REGIONS: CPT

## 2019-02-19 PROCEDURE — 97110 THERAPEUTIC EXERCISES: CPT

## 2019-02-21 ENCOUNTER — HOSPITAL ENCOUNTER (OUTPATIENT)
Dept: OCCUPATIONAL THERAPY | Age: 54
Setting detail: THERAPIES SERIES
Discharge: HOME | End: 2019-02-21
Payer: COMMERCIAL

## 2019-02-21 PROCEDURE — 97110 THERAPEUTIC EXERCISES: CPT

## 2019-02-21 PROCEDURE — 97140 MANUAL THERAPY 1/> REGIONS: CPT

## 2019-02-21 PROCEDURE — 97530 THERAPEUTIC ACTIVITIES: CPT

## 2019-02-21 PROCEDURE — 97018 PARAFFIN BATH THERAPY: CPT

## 2019-02-26 ENCOUNTER — HOSPITAL ENCOUNTER (OUTPATIENT)
Dept: OCCUPATIONAL THERAPY | Age: 54
Setting detail: THERAPIES SERIES
Discharge: HOME | End: 2019-02-26
Payer: COMMERCIAL

## 2019-02-26 PROCEDURE — 97140 MANUAL THERAPY 1/> REGIONS: CPT

## 2019-02-26 PROCEDURE — 97110 THERAPEUTIC EXERCISES: CPT

## 2019-02-26 PROCEDURE — 97022 WHIRLPOOL THERAPY: CPT

## 2019-02-28 ENCOUNTER — HOSPITAL ENCOUNTER (OUTPATIENT)
Dept: OCCUPATIONAL THERAPY | Age: 54
Setting detail: THERAPIES SERIES
Discharge: HOME | End: 2019-02-28
Payer: COMMERCIAL

## 2019-02-28 DIAGNOSIS — S52.532D CLOSED COLLES' FRACTURE OF LEFT RADIUS WITH ROUTINE HEALING, SUBSEQUENT ENCOUNTER: Primary | ICD-10-CM

## 2019-02-28 PROCEDURE — 97140 MANUAL THERAPY 1/> REGIONS: CPT

## 2019-02-28 PROCEDURE — 97110 THERAPEUTIC EXERCISES: CPT

## 2019-02-28 PROCEDURE — 97022 WHIRLPOOL THERAPY: CPT

## 2019-03-01 ENCOUNTER — OFFICE VISIT (OUTPATIENT)
Dept: ORTHOPEDIC SURGERY | Age: 54
End: 2019-03-01
Payer: COMMERCIAL

## 2019-03-01 ENCOUNTER — HOSPITAL ENCOUNTER (OUTPATIENT)
Dept: GENERAL RADIOLOGY | Age: 54
Discharge: HOME | End: 2019-03-03
Payer: COMMERCIAL

## 2019-03-01 VITALS
HEIGHT: 64 IN | HEART RATE: 59 BPM | SYSTOLIC BLOOD PRESSURE: 108 MMHG | DIASTOLIC BLOOD PRESSURE: 70 MMHG | BODY MASS INDEX: 27.31 KG/M2 | RESPIRATION RATE: 17 BRPM | WEIGHT: 160 LBS

## 2019-03-01 DIAGNOSIS — S52.532D CLOSED COLLES' FRACTURE OF LEFT RADIUS WITH ROUTINE HEALING, SUBSEQUENT ENCOUNTER: Primary | ICD-10-CM

## 2019-03-01 DIAGNOSIS — S52.532D CLOSED COLLES' FRACTURE OF LEFT RADIUS WITH ROUTINE HEALING, SUBSEQUENT ENCOUNTER: ICD-10-CM

## 2019-03-01 PROCEDURE — 99024 POSTOP FOLLOW-UP VISIT: CPT | Performed by: PHYSICIAN ASSISTANT

## 2019-03-01 PROCEDURE — 99212 OFFICE O/P EST SF 10 MIN: CPT

## 2019-03-01 PROCEDURE — 73110 X-RAY EXAM OF WRIST: CPT

## 2019-03-02 ENCOUNTER — HOSPITAL ENCOUNTER (OUTPATIENT)
Age: 54
Discharge: HOME | End: 2019-03-02
Payer: COMMERCIAL

## 2019-03-02 LAB
ALBUMIN SERPL-MCNC: 4.2 G/DL (ref 3.5–5.2)
ALP BLD-CCNC: 46 U/L (ref 35–104)
ALT SERPL-CCNC: 18 U/L (ref 0–32)
ANION GAP SERPL CALCULATED.3IONS-SCNC: 11 MMOL/L (ref 7–16)
AST SERPL-CCNC: 27 U/L (ref 0–31)
BASOPHILS ABSOLUTE: 0.04 E9/L (ref 0–0.2)
BASOPHILS RELATIVE PERCENT: 1 % (ref 0–2)
BILIRUB SERPL-MCNC: 0.5 MG/DL (ref 0–1.2)
BUN BLDV-MCNC: 18 MG/DL (ref 6–20)
CALCIUM SERPL-MCNC: 9.7 MG/DL (ref 8.6–10.2)
CHLORIDE BLD-SCNC: 103 MMOL/L (ref 98–107)
CHOLESTEROL, TOTAL: 182 MG/DL (ref 0–199)
CO2: 26 MMOL/L (ref 22–29)
CREAT SERPL-MCNC: 0.9 MG/DL (ref 0.5–1)
EOSINOPHILS ABSOLUTE: 0.16 E9/L (ref 0.05–0.5)
EOSINOPHILS RELATIVE PERCENT: 3.9 % (ref 0–6)
GFR AFRICAN AMERICAN: >60
GFR NON-AFRICAN AMERICAN: >60 ML/MIN/1.73
GLUCOSE BLD-MCNC: 89 MG/DL (ref 74–99)
HCT VFR BLD CALC: 35.7 % (ref 34–48)
HDLC SERPL-MCNC: 69 MG/DL
HEMOGLOBIN: 11.8 G/DL (ref 11.5–15.5)
IMMATURE GRANULOCYTES #: 0.01 E9/L
IMMATURE GRANULOCYTES %: 0.2 % (ref 0–5)
LDL CHOLESTEROL CALCULATED: 104 MG/DL (ref 0–99)
LYMPHOCYTES ABSOLUTE: 0.79 E9/L (ref 1.5–4)
LYMPHOCYTES RELATIVE PERCENT: 19.4 % (ref 20–42)
MCH RBC QN AUTO: 32.4 PG (ref 26–35)
MCHC RBC AUTO-ENTMCNC: 33.1 % (ref 32–34.5)
MCV RBC AUTO: 98.1 FL (ref 80–99.9)
MONOCYTES ABSOLUTE: 0.31 E9/L (ref 0.1–0.95)
MONOCYTES RELATIVE PERCENT: 7.6 % (ref 2–12)
NEUTROPHILS ABSOLUTE: 2.77 E9/L (ref 1.8–7.3)
NEUTROPHILS RELATIVE PERCENT: 67.9 % (ref 43–80)
PDW BLD-RTO: 12.1 FL (ref 11.5–15)
PLATELET # BLD: 213 E9/L (ref 130–450)
PMV BLD AUTO: 9.4 FL (ref 7–12)
POTASSIUM SERPL-SCNC: 4.6 MMOL/L (ref 3.5–5)
RBC # BLD: 3.64 E12/L (ref 3.5–5.5)
SODIUM BLD-SCNC: 140 MMOL/L (ref 132–146)
T4 FREE: 1.34 NG/DL (ref 0.93–1.7)
TOTAL PROTEIN: 6.9 G/DL (ref 6.4–8.3)
TRIGL SERPL-MCNC: 43 MG/DL (ref 0–149)
TSH SERPL DL<=0.05 MIU/L-ACNC: 1.02 UIU/ML (ref 0.27–4.2)
VLDLC SERPL CALC-MCNC: 9 MG/DL
WBC # BLD: 4.1 E9/L (ref 4.5–11.5)

## 2019-03-02 PROCEDURE — 84443 ASSAY THYROID STIM HORMONE: CPT

## 2019-03-02 PROCEDURE — 80061 LIPID PANEL: CPT

## 2019-03-02 PROCEDURE — 36415 COLL VENOUS BLD VENIPUNCTURE: CPT

## 2019-03-02 PROCEDURE — 84439 ASSAY OF FREE THYROXINE: CPT

## 2019-03-02 PROCEDURE — 80053 COMPREHEN METABOLIC PANEL: CPT

## 2019-03-02 PROCEDURE — 85025 COMPLETE CBC W/AUTO DIFF WBC: CPT

## 2019-03-05 ENCOUNTER — HOSPITAL ENCOUNTER (OUTPATIENT)
Dept: OCCUPATIONAL THERAPY | Age: 54
Setting detail: THERAPIES SERIES
Discharge: HOME | End: 2019-03-05
Payer: COMMERCIAL

## 2019-03-05 PROCEDURE — 97530 THERAPEUTIC ACTIVITIES: CPT

## 2019-03-05 PROCEDURE — 97022 WHIRLPOOL THERAPY: CPT

## 2019-03-05 PROCEDURE — 97140 MANUAL THERAPY 1/> REGIONS: CPT

## 2019-03-07 ENCOUNTER — HOSPITAL ENCOUNTER (OUTPATIENT)
Dept: OCCUPATIONAL THERAPY | Age: 54
Setting detail: THERAPIES SERIES
Discharge: HOME | End: 2019-03-07
Payer: COMMERCIAL

## 2019-03-07 PROCEDURE — 97140 MANUAL THERAPY 1/> REGIONS: CPT

## 2019-03-07 PROCEDURE — 97110 THERAPEUTIC EXERCISES: CPT

## 2019-03-07 PROCEDURE — 97530 THERAPEUTIC ACTIVITIES: CPT

## 2019-03-14 ENCOUNTER — HOSPITAL ENCOUNTER (OUTPATIENT)
Dept: OCCUPATIONAL THERAPY | Age: 54
Setting detail: THERAPIES SERIES
Discharge: HOME | End: 2019-03-14
Payer: COMMERCIAL

## 2019-03-19 ENCOUNTER — HOSPITAL ENCOUNTER (OUTPATIENT)
Dept: OCCUPATIONAL THERAPY | Age: 54
Setting detail: THERAPIES SERIES
Discharge: HOME | End: 2019-03-19
Payer: COMMERCIAL

## 2019-03-19 PROCEDURE — 97022 WHIRLPOOL THERAPY: CPT

## 2019-03-19 PROCEDURE — 97110 THERAPEUTIC EXERCISES: CPT

## 2019-03-19 PROCEDURE — 97140 MANUAL THERAPY 1/> REGIONS: CPT

## 2019-03-21 ENCOUNTER — HOSPITAL ENCOUNTER (OUTPATIENT)
Dept: OCCUPATIONAL THERAPY | Age: 54
Setting detail: THERAPIES SERIES
Discharge: HOME | End: 2019-03-21
Payer: COMMERCIAL

## 2019-03-21 PROCEDURE — 97110 THERAPEUTIC EXERCISES: CPT | Performed by: OCCUPATIONAL THERAPIST

## 2019-03-21 PROCEDURE — 97140 MANUAL THERAPY 1/> REGIONS: CPT | Performed by: OCCUPATIONAL THERAPIST

## 2019-03-26 ENCOUNTER — HOSPITAL ENCOUNTER (OUTPATIENT)
Dept: OCCUPATIONAL THERAPY | Age: 54
Setting detail: THERAPIES SERIES
Discharge: HOME | End: 2019-03-26
Payer: COMMERCIAL

## 2019-03-26 PROCEDURE — 97110 THERAPEUTIC EXERCISES: CPT

## 2019-03-26 PROCEDURE — 97022 WHIRLPOOL THERAPY: CPT

## 2019-03-26 PROCEDURE — 97530 THERAPEUTIC ACTIVITIES: CPT

## 2019-03-28 ENCOUNTER — HOSPITAL ENCOUNTER (OUTPATIENT)
Dept: OCCUPATIONAL THERAPY | Age: 54
Setting detail: THERAPIES SERIES
End: 2019-03-28
Payer: COMMERCIAL

## 2019-04-03 DIAGNOSIS — S52.532D CLOSED COLLES' FRACTURE OF LEFT RADIUS WITH ROUTINE HEALING, SUBSEQUENT ENCOUNTER: Primary | ICD-10-CM

## 2019-04-04 ENCOUNTER — OFFICE VISIT (OUTPATIENT)
Dept: ORTHOPEDIC SURGERY | Age: 54
End: 2019-04-04
Payer: COMMERCIAL

## 2019-04-04 ENCOUNTER — HOSPITAL ENCOUNTER (OUTPATIENT)
Dept: GENERAL RADIOLOGY | Age: 54
Discharge: HOME | End: 2019-04-06
Payer: COMMERCIAL

## 2019-04-04 VITALS
WEIGHT: 160 LBS | HEIGHT: 64 IN | HEART RATE: 70 BPM | DIASTOLIC BLOOD PRESSURE: 65 MMHG | SYSTOLIC BLOOD PRESSURE: 99 MMHG | BODY MASS INDEX: 27.31 KG/M2

## 2019-04-04 DIAGNOSIS — S52.532D CLOSED COLLES' FRACTURE OF LEFT RADIUS WITH ROUTINE HEALING, SUBSEQUENT ENCOUNTER: ICD-10-CM

## 2019-04-04 DIAGNOSIS — S52.532D CLOSED COLLES' FRACTURE OF LEFT RADIUS WITH ROUTINE HEALING, SUBSEQUENT ENCOUNTER: Primary | ICD-10-CM

## 2019-04-04 PROCEDURE — 99024 POSTOP FOLLOW-UP VISIT: CPT | Performed by: PHYSICIAN ASSISTANT

## 2019-04-04 PROCEDURE — 99212 OFFICE O/P EST SF 10 MIN: CPT | Performed by: PHYSICIAN ASSISTANT

## 2019-04-04 PROCEDURE — 73110 X-RAY EXAM OF WRIST: CPT

## 2019-04-04 NOTE — PATIENT INSTRUCTIONS
May discontinue use of the brace, OK to continue to wear at night to prevent carpal tunnel symptoms  OK to progress to Weightbearing as tolerated on left wrist, continue to progress your ROM exercises  Can continue over the counter tylenol or ibuprofen as needed for pain  Make sure to ice after activities to help with soreness and swelling  Continue with calcium and vitamin D  Follow up in 3 months, call sooner with questions or concerns

## 2019-04-04 NOTE — LETTER
[address]  [address] [place] [id number]  Phone: [phone]  Fax: [phone]    Gale Lopez PA-C        April 4, 2019     Patient: Jerilyn Cushing   YOB: 1965   Date of Visit: 4/4/2019       To Whom It May Concern: It is my medical opinion that Jerilyn Cushing may return to full duty immediately with no restrictions. If you have any questions or concerns, please don't hesitate to call.     Sincerely,        Gale Lopez PA-C

## 2019-04-09 ENCOUNTER — APPOINTMENT (OUTPATIENT)
Dept: OCCUPATIONAL THERAPY | Age: 54
End: 2019-04-09
Payer: COMMERCIAL

## 2019-04-10 ENCOUNTER — HOSPITAL ENCOUNTER (OUTPATIENT)
Dept: OCCUPATIONAL THERAPY | Age: 54
Setting detail: THERAPIES SERIES
Discharge: HOME | End: 2019-04-10
Payer: COMMERCIAL

## 2019-04-10 PROCEDURE — 97110 THERAPEUTIC EXERCISES: CPT

## 2019-04-10 PROCEDURE — 97530 THERAPEUTIC ACTIVITIES: CPT

## 2019-04-10 NOTE — PROGRESS NOTES
OCCUPATIONAL THERAPY PROGRESS NOTE  DISCHARGE SUMMARY    [facility]   Phone: [phone]   Fax: [phone]     Date:  4/10/2019  Initial Evaluation Date:  2/7/2019  Restrictions/Precautions:  Per protocol, low fall risk  Diagnosis:  Distal radius fx L, Orif                                                       Insurance/Certification information:  [employer]  Referring Physician:  Ricco Felix PA-C, Dr. Marcelino Arthur   Date of Surgery/Injury: 1/16/19///1/16/19 (3 weeks post surgery)  Plan of care signed (Y/N):  no  Visit# / total visits: 13 / 12-18  Pain Level: Min   Aches, throbs after a work day    Subjective:  Requesting discharge due to copay. Will continue HEP independently. Objective:  Engaged patient in the following with focus on ROM, pain reduction, flexibility edema control, and scar management. Reassessment completed. INTERVENTION: COMPLETED REPS/TIME: SPECIFICS/COMMENTS:   Modality:      Fluidotherapy x 15 min    Paraffin      MHP      Estim      Manual Therapy:      Scar Massage  8 min    Soft Tissue:  15 min edema   Therapeutic Ex/Activities: Towel Task x 12 reps    Prayer Stretch x 20 x    Free Wts   2# Flex/ext, pro/sup, ul/rad deviation  10 x 2   Oswald Whall   Added to HEP with golf balls   Putty x 10 min  Massage self Push, pull, pinch, roll  Activity tolerance training   Pro/sup  10 min Ball   And hammer tasks   Flxbar x 10 x 2 reps Light bar   Clothespins  Green/red 2 & 4#  3 sets   Table stretch x 7 min Flexion   Instructions for HEP   FM tasks x 20 min Established a FM HEP   Beads, nuts/bolts, pennies, paperclips               ROM:  15 min Rad/ulnar deviation, flex/ext, pro/sup. Instructions for HEP               Other:                    Assessment: Patient has reached maximum potential at this time with stated goals. Continue HEP to maintain and/or progress functional status.  Patient benefited from skilled OT intervention at this time. Initial:  Wrist ext/flex: 19/18   : full finger flex and ext    Pron/sup: 90/65    9 Hole Peg Test:    Left: 25 sec     Right: 25 sec  Current:  Wrist ext/flex: 41/40(55/45)  Pro/sup:  90/70  /Pinch:  L = 50#/11#  R = 60#/13#     9 Hole:  Left: 20 sec  Right: 21 sec    -Time In: 315  -Time Out: 410  Timed Treatment Minutes: 45  Minutes    Goals:  6 weeks or 18 visits  1) Patient will demonstrate good understanding of home program(exercises/activities/diagnosis/prognosis/goals) with good accuracy. Goal Reached  2) Patient will demonstrate increased active/passive range of motion of their L wrist to [facility] for ADL/IADL completion. Goal Reached:  WFL  3) Patient will demonstrate increased /pinch strength of at least 10 / 5 pinch pounds of their L hand. Goal Reached  See above  4) Patient to report decreased pain in their affected L wrist from 5/10 to 0-2/10 or less with resistive functional use. Goal Reached:  2/10  5) Patient to report 100% compliance with their splint wear, care, and precautions if needed. Goal Reached  6) Patient will be knowledgeable of edema control techniques as evident with decreases from  to none. Goal Reached  7) Patient will demonstrate a non-tender/non-adherent scar and good tissue mobility. Goal Reached  8) Patient will report ADL functions same as prior to diagnosis of distal wrist fx. Goal Reached with ADL, IADLs are slowly returning per her report     Plan:   []  Continues Plan of care: Treatment delivered based on POC and graduated to patient's progress. Patient education continues at each visit to obtain maximum benefit from skilled OT intervention.     []  Alter Plan of care: Reduce sessions to 2x/mo with HEP in place for strengthening and Stretching    [x]  Discharge:      Octavio Scale,  CHRISTY/L [id number]

## 2019-05-02 ENCOUNTER — EMPLOYEE WELLNESS (OUTPATIENT)
Dept: OTHER | Age: 54
End: 2019-05-02

## 2019-05-02 LAB
CHOLESTEROL, TOTAL: 188 MG/DL (ref 0–199)
GLUCOSE BLD-MCNC: 82 MG/DL (ref 74–107)
HDLC SERPL-MCNC: 73 MG/DL
LDL CHOLESTEROL CALCULATED: 107 MG/DL (ref 0–99)
TRIGL SERPL-MCNC: 42 MG/DL (ref 0–149)

## 2019-05-06 VITALS — BODY MASS INDEX: 27.64 KG/M2 | WEIGHT: 161 LBS

## 2019-06-06 DIAGNOSIS — E16.2 HYPOGLYCEMIA: ICD-10-CM

## 2019-06-06 RX ORDER — FLUOXETINE 10 MG/1
20 TABLET, FILM COATED ORAL EVERY EVENING
Qty: 60 TABLET | Refills: 1 | Status: SHIPPED | OUTPATIENT
Start: 2019-06-06 | End: 2019-08-30 | Stop reason: SDUPTHER

## 2019-07-01 ENCOUNTER — TELEPHONE (OUTPATIENT)
Dept: ADMINISTRATIVE | Age: 54
End: 2019-07-01

## 2019-07-02 ENCOUNTER — TELEPHONE (OUTPATIENT)
Dept: ORTHOPEDIC SURGERY | Age: 54
End: 2019-07-02

## 2019-07-05 ENCOUNTER — OFFICE VISIT (OUTPATIENT)
Dept: ORTHOPEDIC SURGERY | Age: 54
End: 2019-07-05
Payer: COMMERCIAL

## 2019-07-05 ENCOUNTER — HOSPITAL ENCOUNTER (OUTPATIENT)
Dept: GENERAL RADIOLOGY | Age: 54
Discharge: HOME | End: 2019-07-07
Payer: COMMERCIAL

## 2019-07-05 VITALS
HEART RATE: 66 BPM | DIASTOLIC BLOOD PRESSURE: 67 MMHG | HEIGHT: 64 IN | WEIGHT: 163 LBS | BODY MASS INDEX: 27.83 KG/M2 | SYSTOLIC BLOOD PRESSURE: 116 MMHG

## 2019-07-05 DIAGNOSIS — S52.532D CLOSED COLLES' FRACTURE OF LEFT RADIUS WITH ROUTINE HEALING, SUBSEQUENT ENCOUNTER: ICD-10-CM

## 2019-07-05 DIAGNOSIS — S52.532D CLOSED COLLES' FRACTURE OF LEFT RADIUS WITH ROUTINE HEALING, SUBSEQUENT ENCOUNTER: Primary | ICD-10-CM

## 2019-07-05 DIAGNOSIS — T84.84XA PAINFUL ORTHOPAEDIC HARDWARE (HCC): ICD-10-CM

## 2019-07-05 PROCEDURE — 73110 X-RAY EXAM OF WRIST: CPT

## 2019-07-05 PROCEDURE — 99212 OFFICE O/P EST SF 10 MIN: CPT | Performed by: PHYSICIAN ASSISTANT

## 2019-07-05 PROCEDURE — 99213 OFFICE O/P EST LOW 20 MIN: CPT | Performed by: PHYSICIAN ASSISTANT

## 2019-07-08 PROBLEM — T84.84XA PAINFUL ORTHOPAEDIC HARDWARE (HCC): Status: ACTIVE | Noted: 2019-07-08

## 2019-07-09 ENCOUNTER — TELEPHONE (OUTPATIENT)
Dept: PRIMARY CARE CLINIC | Age: 54
End: 2019-07-09

## 2019-07-09 ENCOUNTER — PREP FOR PROCEDURE (OUTPATIENT)
Dept: ORTHOPEDIC SURGERY | Age: 54
End: 2019-07-09

## 2019-07-09 RX ORDER — SODIUM CHLORIDE, SODIUM LACTATE, POTASSIUM CHLORIDE, CALCIUM CHLORIDE 600; 310; 30; 20 MG/100ML; MG/100ML; MG/100ML; MG/100ML
INJECTION, SOLUTION INTRAVENOUS CONTINUOUS
Status: CANCELLED | OUTPATIENT
Start: 2019-07-09

## 2019-07-09 RX ORDER — SODIUM CHLORIDE 0.9 % (FLUSH) 0.9 %
10 SYRINGE (ML) INJECTION EVERY 12 HOURS SCHEDULED
Status: CANCELLED | OUTPATIENT
Start: 2019-07-09

## 2019-07-09 RX ORDER — SODIUM CHLORIDE 0.9 % (FLUSH) 0.9 %
10 SYRINGE (ML) INJECTION PRN
Status: CANCELLED | OUTPATIENT
Start: 2019-07-09

## 2019-07-11 NOTE — PROGRESS NOTES
intact  Subjectively states sensation intact to radial/medial/ulnar distribution   does have tenderness to palpation over the dorsal aspect of the distal radius, there is palpable prominence of hardware without evidence of skin breakdown or tenting. Incision well healed with no redness, drainage or warmth      /67 (Site: Left Upper Arm, Position: Sitting, Cuff Size: Large Adult)   Pulse 66   Ht 5' 4\" (1.626 m)   Wt 163 lb (73.9 kg)   BMI 27.98 kg/m²      XR: 7/5/19 x-rays the left wrist demonstrate stable fixation of the distal radius. Still radius fracture appears well-healed. There is redemonstration of the same ulnar styloid avulsion fracture. There does appear to be prominent hardware on the lateral view of the radius fixation. No acute fracture dislocation noted. ASSESSMENT:     Diagnosis Orders   1. Closed Colles' fracture of left radius with routine healing, subsequent encounter     2. Painful orthopaedic hardware [facility])         Discussion: The patient her current complaints as well as prominence of palpable hardware on exam.  We did assess removal of hardware, given the palpable prominence and we did discuss attentional risks for tendon irritation and possibility of tendon rupture. No guarantees were provided that this would resolve all of her symptoms with hardware removal.  Patient was agreeable. Risk, benefits and treatment options discussed with True Trujillo. She has verbalized understanding of options. The possibility of complications were also discussed to include but not limited to nerve damage, infection, problems with wound healing, vascular injury, chronic pain, stiffness, dysfunction, nonhealing of the bone, symptomatic hardware and/or its failure, need for subsequent surgery, dislocation, and blood clots as well as medical related problems and other problems not specifically discussed. Risk of anesthesia also discussed to include death.    Post-op care, work, activity and restrictions which included the use of pain medication and possibility of using blood thinner post op were also discussed with Amina Springer and she verbalized and agreed with the restrictions. PLAN:  Continue activity as tolerated  Wrist velcro brace as needed for comfort  NSAIDs and Acetaminophen PRN for pain  Discussed Removal of hardware, will plan for OR with Dr. Kavon Ladd for removal of hardware from left wrist.  This to be done on a Friday. We did discuss postoperative follow-up and limitations. Patient was agreeable with this plan. She will be n.p.o. the night before surgery    Electronically signed by Israel Lezama PA-C on 7/11/2019 at 11:17 AM  Note: This report was completed using computerColorescience voiced recognition software. Every effort has been made to ensure accuracy; however, inadvertent computerized transcription errors may be present.

## 2019-07-16 ENCOUNTER — OFFICE VISIT (OUTPATIENT)
Dept: PRIMARY CARE CLINIC | Age: 54
End: 2019-07-16
Payer: COMMERCIAL

## 2019-07-16 VITALS
WEIGHT: 164.4 LBS | BODY MASS INDEX: 28.07 KG/M2 | HEART RATE: 68 BPM | SYSTOLIC BLOOD PRESSURE: 130 MMHG | HEIGHT: 64 IN | OXYGEN SATURATION: 99 % | DIASTOLIC BLOOD PRESSURE: 66 MMHG | TEMPERATURE: 97.9 F

## 2019-07-16 DIAGNOSIS — K21.9 GASTROESOPHAGEAL REFLUX DISEASE WITHOUT ESOPHAGITIS: ICD-10-CM

## 2019-07-16 DIAGNOSIS — Z01.818 PREOP EXAMINATION: Primary | ICD-10-CM

## 2019-07-16 DIAGNOSIS — E03.9 HYPOTHYROIDISM, UNSPECIFIED TYPE: ICD-10-CM

## 2019-07-16 PROCEDURE — 99214 OFFICE O/P EST MOD 30 MIN: CPT | Performed by: FAMILY MEDICINE

## 2019-07-16 PROCEDURE — 93000 ELECTROCARDIOGRAM COMPLETE: CPT | Performed by: FAMILY MEDICINE

## 2019-07-16 RX ORDER — ESOMEPRAZOLE MAGNESIUM 40 MG/1
40 CAPSULE, DELAYED RELEASE ORAL
Qty: 30 CAPSULE | Refills: 5 | Status: SHIPPED
Start: 2019-07-16 | End: 2020-07-24 | Stop reason: SDUPTHER

## 2019-07-16 RX ORDER — ESOMEPRAZOLE MAGNESIUM 40 MG/1
40 FOR SUSPENSION ORAL DAILY
Qty: 30 EACH | Refills: 2 | Status: SHIPPED
Start: 2019-07-16 | End: 2019-07-16

## 2019-07-16 ASSESSMENT — ENCOUNTER SYMPTOMS
ALLERGIC/IMMUNOLOGIC NEGATIVE: 1
EYES NEGATIVE: 1
GASTROINTESTINAL NEGATIVE: 1
RESPIRATORY NEGATIVE: 1

## 2019-07-16 ASSESSMENT — PATIENT HEALTH QUESTIONNAIRE - PHQ9
SUM OF ALL RESPONSES TO PHQ QUESTIONS 1-9: 0
2. FEELING DOWN, DEPRESSED OR HOPELESS: 0
SUM OF ALL RESPONSES TO PHQ9 QUESTIONS 1 & 2: 0
SUM OF ALL RESPONSES TO PHQ QUESTIONS 1-9: 0
1. LITTLE INTEREST OR PLEASURE IN DOING THINGS: 0

## 2019-07-16 NOTE — PROGRESS NOTES
procedure, you may call the pre-op area if you have concerns about your blood sugar [phone]. [] Use your inhalers the morning of surgery. Bring your emergency inhaler with you day of surgery. [x] Follow physician instructions regarding any blood thinners you may be taking. WHAT TO EXPECT:  [x] The day of surgery you will be greeted and checked in by the [facility] [employer].  In addition, you will be registered in the [facility] by a Patient Access Representative. Please bring your photo ID and insurance card. A nurse will greet you in accordance to the time you are needed in the pre-op area to prepare you for surgery. Please do not be discouraged if you are not greeted in the order you arrive as there are many variables that are involved in patient preparation. Your patience is greatly appreciated as you wait for your nurse. Please bring in items such as: books, magazines, newspapers, electronics, or any other items  to occupy your time in the waiting area. [x]  Delays may occur with surgery and staff will make a sincere effort to keep you informed of delays. If any delays occur with your procedure, we apologize ahead of time for your inconvenience as we recognize the value of your time.

## 2019-07-19 ENCOUNTER — ANESTHESIA EVENT (OUTPATIENT)
Dept: OPERATING ROOM | Age: 54
End: 2019-07-19
Payer: COMMERCIAL

## 2019-07-19 ENCOUNTER — ANESTHESIA (OUTPATIENT)
Dept: OPERATING ROOM | Age: 54
End: 2019-07-19
Payer: COMMERCIAL

## 2019-07-19 ENCOUNTER — APPOINTMENT (OUTPATIENT)
Dept: GENERAL RADIOLOGY | Age: 54
End: 2019-07-19
Attending: ORTHOPAEDIC SURGERY
Payer: COMMERCIAL

## 2019-07-19 ENCOUNTER — HOSPITAL ENCOUNTER (OUTPATIENT)
Age: 54
Setting detail: OUTPATIENT SURGERY
Discharge: HOME | End: 2019-07-19
Attending: ORTHOPAEDIC SURGERY | Admitting: ORTHOPAEDIC SURGERY
Payer: COMMERCIAL

## 2019-07-19 VITALS
HEIGHT: 64 IN | OXYGEN SATURATION: 99 % | RESPIRATION RATE: 16 BRPM | HEART RATE: 85 BPM | BODY MASS INDEX: 27.83 KG/M2 | WEIGHT: 163 LBS | SYSTOLIC BLOOD PRESSURE: 101 MMHG | DIASTOLIC BLOOD PRESSURE: 55 MMHG | TEMPERATURE: 98.2 F

## 2019-07-19 VITALS
OXYGEN SATURATION: 100 % | RESPIRATION RATE: 4 BRPM | SYSTOLIC BLOOD PRESSURE: 105 MMHG | DIASTOLIC BLOOD PRESSURE: 71 MMHG | TEMPERATURE: 93.4 F

## 2019-07-19 DIAGNOSIS — S52.532D CLOSED COLLES' FRACTURE OF LEFT RADIUS WITH ROUTINE HEALING, SUBSEQUENT ENCOUNTER: Primary | ICD-10-CM

## 2019-07-19 DIAGNOSIS — T84.84XA PAINFUL ORTHOPAEDIC HARDWARE (HCC): ICD-10-CM

## 2019-07-19 LAB — METER GLUCOSE: 81 MG/DL (ref 74–99)

## 2019-07-19 PROCEDURE — 2580000003 HC RX 258: Performed by: PHYSICIAN ASSISTANT

## 2019-07-19 PROCEDURE — 2500000003 HC RX 250 WO HCPCS: Performed by: ANESTHESIOLOGIST ASSISTANT

## 2019-07-19 PROCEDURE — 20680 REMOVAL OF IMPLANT DEEP: CPT | Performed by: ORTHOPAEDIC SURGERY

## 2019-07-19 PROCEDURE — 2709999900 HC NON-CHARGEABLE SUPPLY: Performed by: ORTHOPAEDIC SURGERY

## 2019-07-19 PROCEDURE — 3600000014 HC SURGERY LEVEL 4 ADDTL 15MIN: Performed by: ORTHOPAEDIC SURGERY

## 2019-07-19 PROCEDURE — 3700000000 HC ANESTHESIA ATTENDED CARE: Performed by: ORTHOPAEDIC SURGERY

## 2019-07-19 PROCEDURE — 73110 X-RAY EXAM OF WRIST: CPT

## 2019-07-19 PROCEDURE — 6370000000 HC RX 637 (ALT 250 FOR IP): Performed by: ANESTHESIOLOGY

## 2019-07-19 PROCEDURE — 7100000000 HC PACU RECOVERY - FIRST 15 MIN: Performed by: ORTHOPAEDIC SURGERY

## 2019-07-19 PROCEDURE — 6370000000 HC RX 637 (ALT 250 FOR IP): Performed by: STUDENT IN AN ORGANIZED HEALTH CARE EDUCATION/TRAINING PROGRAM

## 2019-07-19 PROCEDURE — 82962 GLUCOSE BLOOD TEST: CPT

## 2019-07-19 PROCEDURE — 6360000002 HC RX W HCPCS: Performed by: PHYSICIAN ASSISTANT

## 2019-07-19 PROCEDURE — 88300 SURGICAL PATH GROSS: CPT

## 2019-07-19 PROCEDURE — 2500000003 HC RX 250 WO HCPCS: Performed by: ORTHOPAEDIC SURGERY

## 2019-07-19 PROCEDURE — 3600000004 HC SURGERY LEVEL 4 BASE: Performed by: ORTHOPAEDIC SURGERY

## 2019-07-19 PROCEDURE — 7100000001 HC PACU RECOVERY - ADDTL 15 MIN: Performed by: ORTHOPAEDIC SURGERY

## 2019-07-19 PROCEDURE — 3700000001 HC ADD 15 MINUTES (ANESTHESIA): Performed by: ORTHOPAEDIC SURGERY

## 2019-07-19 PROCEDURE — 7100000011 HC PHASE II RECOVERY - ADDTL 15 MIN: Performed by: ORTHOPAEDIC SURGERY

## 2019-07-19 PROCEDURE — 6360000002 HC RX W HCPCS: Performed by: ANESTHESIOLOGIST ASSISTANT

## 2019-07-19 PROCEDURE — 6370000000 HC RX 637 (ALT 250 FOR IP): Performed by: ORTHOPAEDIC SURGERY

## 2019-07-19 PROCEDURE — 7100000010 HC PHASE II RECOVERY - FIRST 15 MIN: Performed by: ORTHOPAEDIC SURGERY

## 2019-07-19 RX ORDER — BUPIVACAINE HYDROCHLORIDE 5 MG/ML
INJECTION, SOLUTION EPIDURAL; INTRACAUDAL PRN
Status: DISCONTINUED | OUTPATIENT
Start: 2019-07-19 | End: 2019-07-19 | Stop reason: ALTCHOICE

## 2019-07-19 RX ORDER — ONDANSETRON 2 MG/ML
4 INJECTION INTRAMUSCULAR; INTRAVENOUS
Status: DISCONTINUED | OUTPATIENT
Start: 2019-07-19 | End: 2019-07-19 | Stop reason: HOSPADM

## 2019-07-19 RX ORDER — HYDROCODONE BITARTRATE AND ACETAMINOPHEN 5; 325 MG/1; MG/1
1 TABLET ORAL EVERY 4 HOURS PRN
Qty: 18 TABLET | Refills: 0 | Status: SHIPPED | OUTPATIENT
Start: 2019-07-19 | End: 2019-07-22

## 2019-07-19 RX ORDER — GLYCOPYRROLATE 1 MG/5 ML
SYRINGE (ML) INTRAVENOUS PRN
Status: DISCONTINUED | OUTPATIENT
Start: 2019-07-19 | End: 2019-07-19 | Stop reason: SDUPTHER

## 2019-07-19 RX ORDER — ONDANSETRON 2 MG/ML
INJECTION INTRAMUSCULAR; INTRAVENOUS PRN
Status: DISCONTINUED | OUTPATIENT
Start: 2019-07-19 | End: 2019-07-19 | Stop reason: SDUPTHER

## 2019-07-19 RX ORDER — SODIUM CHLORIDE 0.9 % (FLUSH) 0.9 %
10 SYRINGE (ML) INJECTION PRN
Status: DISCONTINUED | OUTPATIENT
Start: 2019-07-19 | End: 2019-07-19 | Stop reason: HOSPADM

## 2019-07-19 RX ORDER — LIDOCAINE HYDROCHLORIDE 20 MG/ML
INJECTION, SOLUTION INTRAVENOUS PRN
Status: DISCONTINUED | OUTPATIENT
Start: 2019-07-19 | End: 2019-07-19 | Stop reason: SDUPTHER

## 2019-07-19 RX ORDER — MIDAZOLAM HYDROCHLORIDE 1 MG/ML
INJECTION INTRAMUSCULAR; INTRAVENOUS PRN
Status: DISCONTINUED | OUTPATIENT
Start: 2019-07-19 | End: 2019-07-19 | Stop reason: SDUPTHER

## 2019-07-19 RX ORDER — MEPERIDINE HYDROCHLORIDE 50 MG/ML
12.5 INJECTION INTRAMUSCULAR; INTRAVENOUS; SUBCUTANEOUS EVERY 5 MIN PRN
Status: DISCONTINUED | OUTPATIENT
Start: 2019-07-19 | End: 2019-07-19 | Stop reason: HOSPADM

## 2019-07-19 RX ORDER — SCOLOPAMINE TRANSDERMAL SYSTEM 1 MG/1
1 PATCH, EXTENDED RELEASE TRANSDERMAL ONCE
Status: DISCONTINUED | OUTPATIENT
Start: 2019-07-19 | End: 2019-07-19 | Stop reason: HOSPADM

## 2019-07-19 RX ORDER — HYDROCODONE BITARTRATE AND ACETAMINOPHEN 5; 325 MG/1; MG/1
1 TABLET ORAL
Status: COMPLETED | OUTPATIENT
Start: 2019-07-19 | End: 2019-07-19

## 2019-07-19 RX ORDER — EPHEDRINE SULFATE/0.9% NACL/PF 50 MG/5 ML
SYRINGE (ML) INTRAVENOUS PRN
Status: DISCONTINUED | OUTPATIENT
Start: 2019-07-19 | End: 2019-07-19 | Stop reason: SDUPTHER

## 2019-07-19 RX ORDER — FENTANYL CITRATE 50 UG/ML
INJECTION, SOLUTION INTRAMUSCULAR; INTRAVENOUS PRN
Status: DISCONTINUED | OUTPATIENT
Start: 2019-07-19 | End: 2019-07-19 | Stop reason: SDUPTHER

## 2019-07-19 RX ORDER — DIAPER,BRIEF,INFANT-TODD,DISP
EACH MISCELLANEOUS PRN
Status: DISCONTINUED | OUTPATIENT
Start: 2019-07-19 | End: 2019-07-19 | Stop reason: ALTCHOICE

## 2019-07-19 RX ORDER — PROPOFOL 10 MG/ML
INJECTION, EMULSION INTRAVENOUS PRN
Status: DISCONTINUED | OUTPATIENT
Start: 2019-07-19 | End: 2019-07-19 | Stop reason: SDUPTHER

## 2019-07-19 RX ORDER — SODIUM CHLORIDE 0.9 % (FLUSH) 0.9 %
10 SYRINGE (ML) INJECTION EVERY 12 HOURS SCHEDULED
Status: DISCONTINUED | OUTPATIENT
Start: 2019-07-19 | End: 2019-07-19 | Stop reason: HOSPADM

## 2019-07-19 RX ORDER — SODIUM CHLORIDE, SODIUM LACTATE, POTASSIUM CHLORIDE, CALCIUM CHLORIDE 600; 310; 30; 20 MG/100ML; MG/100ML; MG/100ML; MG/100ML
INJECTION, SOLUTION INTRAVENOUS CONTINUOUS
Status: DISCONTINUED | OUTPATIENT
Start: 2019-07-19 | End: 2019-07-19 | Stop reason: HOSPADM

## 2019-07-19 RX ORDER — DEXAMETHASONE SODIUM PHOSPHATE 10 MG/ML
INJECTION INTRAMUSCULAR; INTRAVENOUS PRN
Status: DISCONTINUED | OUTPATIENT
Start: 2019-07-19 | End: 2019-07-19 | Stop reason: SDUPTHER

## 2019-07-19 RX ADMIN — LIDOCAINE HYDROCHLORIDE 50 MG: 20 INJECTION, SOLUTION INTRAVENOUS at 07:34

## 2019-07-19 RX ADMIN — Medication 10 MG: at 07:39

## 2019-07-19 RX ADMIN — FENTANYL CITRATE 50 MCG: 50 INJECTION, SOLUTION INTRAMUSCULAR; INTRAVENOUS at 07:34

## 2019-07-19 RX ADMIN — SODIUM CHLORIDE, POTASSIUM CHLORIDE, SODIUM LACTATE AND CALCIUM CHLORIDE: 600; 310; 30; 20 INJECTION, SOLUTION INTRAVENOUS at 06:23

## 2019-07-19 RX ADMIN — Medication 5 MG: at 07:41

## 2019-07-19 RX ADMIN — HYDROCODONE BITARTRATE AND ACETAMINOPHEN 1 TABLET: 5; 325 TABLET ORAL at 09:35

## 2019-07-19 RX ADMIN — Medication 0.2 MG: at 07:38

## 2019-07-19 RX ADMIN — Medication 5 MG: at 07:44

## 2019-07-19 RX ADMIN — ONDANSETRON HYDROCHLORIDE 4 MG: 2 INJECTION, SOLUTION INTRAMUSCULAR; INTRAVENOUS at 07:55

## 2019-07-19 RX ADMIN — Medication 2 G: at 07:38

## 2019-07-19 RX ADMIN — Medication 5 MG: at 07:48

## 2019-07-19 RX ADMIN — PROPOFOL 160 MG: 10 INJECTION, EMULSION INTRAVENOUS at 07:34

## 2019-07-19 RX ADMIN — DEXAMETHASONE SODIUM PHOSPHATE 10 MG: 10 INJECTION INTRAMUSCULAR; INTRAVENOUS at 07:42

## 2019-07-19 RX ADMIN — MIDAZOLAM HYDROCHLORIDE 2 MG: 1 INJECTION, SOLUTION INTRAMUSCULAR; INTRAVENOUS at 07:30

## 2019-07-19 RX ADMIN — FENTANYL CITRATE 25 MCG: 50 INJECTION, SOLUTION INTRAMUSCULAR; INTRAVENOUS at 08:00

## 2019-07-19 ASSESSMENT — PAIN DESCRIPTION - LOCATION
LOCATION: WRIST

## 2019-07-19 ASSESSMENT — LIFESTYLE VARIABLES: SMOKING_STATUS: 0

## 2019-07-19 ASSESSMENT — PULMONARY FUNCTION TESTS
PIF_VALUE: 1
PIF_VALUE: 3
PIF_VALUE: 2
PIF_VALUE: 18
PIF_VALUE: 1
PIF_VALUE: 3
PIF_VALUE: 2
PIF_VALUE: 3
PIF_VALUE: 2
PIF_VALUE: 13
PIF_VALUE: 2
PIF_VALUE: 3
PIF_VALUE: 1
PIF_VALUE: 4
PIF_VALUE: 8
PIF_VALUE: 2
PIF_VALUE: 2
PIF_VALUE: 14
PIF_VALUE: 0
PIF_VALUE: 5
PIF_VALUE: 16
PIF_VALUE: 2
PIF_VALUE: 2
PIF_VALUE: 10
PIF_VALUE: 2
PIF_VALUE: 3
PIF_VALUE: 17
PIF_VALUE: 17
PIF_VALUE: 10
PIF_VALUE: 2
PIF_VALUE: 11
PIF_VALUE: 2
PIF_VALUE: 13
PIF_VALUE: 2
PIF_VALUE: 15
PIF_VALUE: 0
PIF_VALUE: 3
PIF_VALUE: 2
PIF_VALUE: 4
PIF_VALUE: 2
PIF_VALUE: 2
PIF_VALUE: 5
PIF_VALUE: 14
PIF_VALUE: 9
PIF_VALUE: 0
PIF_VALUE: 2
PIF_VALUE: 17
PIF_VALUE: 14
PIF_VALUE: 5
PIF_VALUE: 3

## 2019-07-19 ASSESSMENT — PAIN DESCRIPTION - ORIENTATION
ORIENTATION: LEFT

## 2019-07-19 ASSESSMENT — PAIN SCALES - GENERAL
PAINLEVEL_OUTOF10: 3
PAINLEVEL_OUTOF10: 0
PAINLEVEL_OUTOF10: 6
PAINLEVEL_OUTOF10: 0
PAINLEVEL_OUTOF10: 0

## 2019-07-19 ASSESSMENT — PAIN DESCRIPTION - PAIN TYPE
TYPE: SURGICAL PAIN

## 2019-07-19 ASSESSMENT — PAIN - FUNCTIONAL ASSESSMENT: PAIN_FUNCTIONAL_ASSESSMENT: 0-10

## 2019-07-19 NOTE — H&P
Maternal Uncle           Social History            Tobacco Use    Smoking status: Never Smoker    Smokeless tobacco: Never Used   Substance Use Topics    Alcohol use: Yes       Comment: social                                    Chief Complaint   Patient presents with    Fracture       F/U left wrist, starting hurting in middle of June, has bump, swelling and grinding      SURGEON: VIRGIL Barker  DATE OF PROCEDURE:  01/16/2019  OPERATION:  Open reduction internal fixation, left wrist distal radius.     SUBJECTIVE: Patient     Review of Systems   Constitutional: Negative for fever, chills, diaphoresis, appetite change and fatigue. HENT: Negative for dental issues, hearing loss and tinnitus. Negative for congestion, sinus pressure, sneezing, sore throat. Negative for headache. Eyes: Negative for visual disturbance, blurred and double vision. Negative for pain, discharge, redness and itching  Respiratory: Negative for cough, shortness of breath and wheezing. Cardiovascular: Negative for chest pain, palpitations and leg swelling. No dyspnea on exertion   Gastrointestinal:   Negative for nausea, vomiting, abdominal pain, diarrhea, constipation  or black or bloody. Hematologic\Lymphatic:  negative for bleeding, petechiae,   Genitourinary: Negative for hematuria and difficulty urinating. Musculoskeletal: Negative for neck pain and stiffness. Negative for back pain, see HPI  Skin: Negative for pallor, rash and wound. Neurological: Negative for dizziness, tremors, seizures, weakness, light-headedness, no TIA or stroke symptoms. No numbness and headaches. Psychiatric/Behavioral: Negative.         OBJECTIVE:       Physical Examination:   General appearance: alert, well appearing, and in no distress,  normal appearing weight.  No visible signs of trauma   Mental status: alert, oriented to person, place, and time, normal mood, behavior, speech, dress, motor activity, and thought processes  Abdomen: soft, nondistended  Resp:   resp easy and unlabored, no audible wheezes note, normal symmetrical expansion of both hemithoraces  Cardiac: distal pulses palpable, skin and extremities well perfused  Neurological: alert, oriented X3, normal speech, no focal findings or movement disorder noted, motor and sensory grossly normal bilaterally, normal muscle tone, no tremors, strength 5/5, normal gait and station  HEENT: normochephalic atraumatic, external ears and eyes normal, sclera normal, neck supple, no nasal discharge. Extremities:   peripheral pulses normal, no edema, redness or tenderness in the calves   Skin: normal coloration, no rashes or open wounds, no suspicious skin lesions noted  Psych: Affect euthymic   Musculoskeletal:   Extremity:  Left Upper Extremity  Skin is clean dry and intact  Mild edema noted to the ulnar aspect  Radial pulse palpable, fingers warm with BCR  Flex/extension intact to wrist, thumb and fingers  Finger opposition intact  Finger adduction/abduction intact  Finger crossover intact  Subjectively states sensation intact to radial/medial/ulnar distribution   does have tenderness to palpation over the dorsal aspect of the distal radius, there is palpable prominence of hardware without evidence of skin breakdown or tenting. Incision well healed with no redness, drainage or warmth        /67 (Site: Left Upper Arm, Position: Sitting, Cuff Size: Large Adult)   Pulse 66   Ht 5' 4\" (1.626 m)   Wt 163 lb (73.9 kg)   BMI 27.98 kg/m²      XR: 7/5/19 x-rays the left wrist demonstrate stable fixation of the distal radius. Still radius fracture appears well-healed. There is redemonstration of the same ulnar styloid avulsion fracture. There does appear to be prominent hardware on the lateral view of the radius fixation. No acute fracture dislocation noted.     ASSESSMENT:       Diagnosis Orders   1. Closed Colles' fracture of left radius with routine healing, subsequent encounter      2.

## 2019-07-19 NOTE — BRIEF OP NOTE
Brief Postoperative Note  ______________________________________________________________    Patient: Arpan Kinsey  YOB: 1965  MRN: [id number]  Date of Procedure: 7/19/2019    Pre-Op Diagnosis: LEFT COLLES FX, LEFT WRIST PAINFUL ORTHOPEDIC HARDWARE    Post-Op Diagnosis: Same       Procedure(s):  LEFT WRIST  REMOVAL OF HARDWARE    Anesthesia: General    Surgeon(s):  Darrian Us DO    Assistant:   Celina Llanos    Estimated Blood Loss (mL): less than 50     Complications: None    Specimens:   ID Type Source Tests Collected by Time Destination   A : PLATE AND SCREWS LEFT WRIST Hardware Hardware [place] 1690,  7/19/2019 0758        Implants:  * No implants in log *      Drains: * No LDAs found *    Findings: see op note    Estelita Allison DO  Date: 7/19/2019  Time: 8:26 AM

## 2019-07-19 NOTE — ANESTHESIA PRE PROCEDURE
Department of Anesthesiology  Preprocedure Note       Name:  Angelo Talavera   Age:  53 y.o.  :  1965                                          MRN:  [id number]         Date:  2019      Surgeon: Elliott Jimenez):  Corrie Lopez DO    Procedure: LEFT WRIST  REMOVAL OF HARDWARE (Left )    Medications prior to admission:   Prior to Admission medications    Medication Sig Start Date End Date Taking? Authorizing Provider   esomeprazole (NEXIUM) 40 MG delayed release capsule Take 1 capsule by mouth every morning (before breakfast) 19   Mariangel Holman, DO   FLUoxetine (PROZAC) 10 MG tablet Take 2 tablets by mouth every evening  Patient taking differently: Take 10 mg by mouth 2 times daily  19   Mariangel Videsfoot, DO   calcium citrate (CALCITRATE) 250 MG TABS tablet Take 1 tablet by mouth 2 times daily 19   Aline Alfred PA-C   levothyroxine (SYNTHROID) 75 MCG tablet Take 75 mcg by mouth Daily. Historical Provider, MD   Cholecalciferol (VITAMIN D-3) 5000 UNITS TABS Take by mouth daily Instructed to hold    Historical Provider, MD       Current medications:    No current facility-administered medications for this visit. No current outpatient medications on file.      Facility-Administered Medications Ordered in Other Visits   Medication Dose Route Frequency Provider Last Rate Last Dose    ceFAZolin (ANCEF) 2 g in dextrose 5 % 50 mL IVPB  2 g Intravenous On Call to Regine Wallace PA-C        lactated ringers infusion   Intravenous Continuous Sonia Westbrook PA-C 100 mL/hr at 19 3644      sodium chloride flush 0.9 % injection 10 mL  10 mL Intravenous 2 times per day Sonia Westbrook PA-C        sodium chloride flush 0.9 % injection 10 mL  10 mL Intravenous PRN Sonia Westbrook PA-C        scopolamine (TRANSDERM-SCOP) transdermal patch 1 patch  1 patch Transdermal Once Juventino James MD        HYDROmorphone (DILAUDID) injection 0.25 mg  0.25 mg Intravenous Q5 Min PRN Amalia Charles MD        HYDROmorphone (DILAUDID) injection 0.5 mg  0.5 mg Intravenous Q5 Min PRN Amalia Charles MD        ondansetron [facility]) injection 4 mg  4 mg Intravenous Once PRN Amalia Charles MD        meperidine (DEMEROL) injection 12.5 mg  12.5 mg Intravenous Q5 Min PRN Amalia Charles MD           Allergies: Allergies   Allergen Reactions    Latex     Prednisone        Problem List:    Patient Active Problem List   Diagnosis Code    Difficult intubation T88. 4XXA    Latex allergy, contact dermatitis L25.3    Fracture, [place], left, closed S52.532A    Painful orthopaedic hardware ([address] T84.84XA    Hypothyroidism E03.9    Gastroesophageal reflux disease without esophagitis K21.9    Preop examination Z01.818       Past Medical History:        Diagnosis Date    Anemia     Anxiety     Celiac disease     Difficult intubation     Intubated successfully 1/2009 with Glidescope #3, 7.0 ETT.  Eating disorder 10 years ago    anorexia, Dr. Abraham Tom monitoring.  History of blood transfusion 1993.     Hypoglycemia     Hypothyroidism     Latex allergy, contact dermatitis     PONV (postoperative nausea and vomiting)     TMJ (dislocation of temporomandibular joint)     Vitamin D deficiency     follows with Dr. Abraham Tom       Past Surgical History:        Procedure Laterality Date    [facility]TERUS  2007, 2006    HYSTERECTOMY  2008   [facility], [address] [place] [address]    x 4    OTHER SURGICAL HISTORY  10/8/15    bilateral tmj arthrodesis/steroid injection    UPPER GASTROINTESTINAL ENDOSCOPY  12/17/2014    UPPER GASTROINTESTINAL ENDOSCOPY  04/12/2017    UPPER GASTROINTESTINAL ENDOSCOPY N/A 7/13/2018    EGD BIOPSY performed by Linette Cota MD at [address] Left 1/16/2019    LEFT WRIST OPEN REDUCTION INTERNAL FIXATION performed by Floyd Ayala DO at [address] [place]

## 2019-08-01 DIAGNOSIS — T84.84XA PAINFUL ORTHOPAEDIC HARDWARE (HCC): Primary | ICD-10-CM

## 2019-08-05 ENCOUNTER — HOSPITAL ENCOUNTER (OUTPATIENT)
Dept: GENERAL RADIOLOGY | Age: 54
Discharge: HOME | End: 2019-08-07
Payer: COMMERCIAL

## 2019-08-05 ENCOUNTER — OFFICE VISIT (OUTPATIENT)
Dept: ORTHOPEDIC SURGERY | Age: 54
End: 2019-08-05
Payer: COMMERCIAL

## 2019-08-05 VITALS
DIASTOLIC BLOOD PRESSURE: 66 MMHG | HEART RATE: 63 BPM | BODY MASS INDEX: 28 KG/M2 | HEIGHT: 64 IN | WEIGHT: 164 LBS | SYSTOLIC BLOOD PRESSURE: 111 MMHG

## 2019-08-05 DIAGNOSIS — T84.84XA PAINFUL ORTHOPAEDIC HARDWARE (HCC): ICD-10-CM

## 2019-08-05 DIAGNOSIS — S52.532D CLOSED COLLES' FRACTURE OF LEFT RADIUS WITH ROUTINE HEALING, SUBSEQUENT ENCOUNTER: ICD-10-CM

## 2019-08-05 DIAGNOSIS — T84.84XA PAINFUL ORTHOPAEDIC HARDWARE (HCC): Primary | ICD-10-CM

## 2019-08-05 PROCEDURE — 73110 X-RAY EXAM OF WRIST: CPT

## 2019-08-05 PROCEDURE — 99024 POSTOP FOLLOW-UP VISIT: CPT | Performed by: NURSE PRACTITIONER

## 2019-08-05 PROCEDURE — 99213 OFFICE O/P EST LOW 20 MIN: CPT | Performed by: NURSE PRACTITIONER

## 2019-08-05 NOTE — PROGRESS NOTES
OP: DATE OF PROCEDURE:  07/19/2019  OPERATING SURGEON:  Moi Puga DO  PROCEDURE:  Left wrist deep implant removal.     Subjective:  Jamil Boyle is approximately 2 weeks follow-up from the above surgery. Patient is WBAT on that extremity. She ambulates with no assistive device, none. Pain to extremity is none and is not taking pain medication. She denies numbness, tingling, weakness. Denies Calf pain. Patient is not participating in therapy. She is feeling great. So happy she decided to have the hardware removed. She no longer has the clicking and grinding in the wrist. She feels like its her own hand again. She is left hand dominant. Review of Systems -    General ROS: negative for - chills, fatigue, fever or night sweats  Respiratory ROS: no cough, shortness of breath, or wheezing  Cardiovascular ROS: no chest pain or dyspnea on exertion  Gastrointestinal ROS: no abdominal pain, nausea, vomiting, diarrhea, constipation,or black or bloody stools  Genitourinary: no hematuria, dysuria, or incontinence   Musculoskeletal ROS: negative for -back or neck pain or stiffness, also see HPI  Neurological ROS: no TIA or stroke symptoms     Objective:    General: Alert and oriented X 3, normocephalic atraumatic, external ears and eye normal, sclera clear, no acute distress, respirations easy and unlabored with no audible wheezes, skin warm and dry, speech and dress appropriate for noted age, affect euthymic. Extremity:  Left Upper Extremity  Skin is clean dry and intact  No edema noted, no tenderness to palpation over the wrist.   Radial pulse palpable, fingers warm with BCR  Flex/extension intact to wrist, thumb and fingers  Finger opposition intact  Finger adduction/abduction intact  Finger crossover intact  Subjectively states sensation intact to radial/medial/ulnar distribution  Able to make a full fist without pain. No crepitus over the left wrist with ROM.    Incision well approximated with no redness,

## 2019-08-15 PROBLEM — Z01.818 PREOP EXAMINATION: Status: RESOLVED | Noted: 2019-07-16 | Resolved: 2019-08-15

## 2019-08-23 DIAGNOSIS — T84.84XA PAINFUL ORTHOPAEDIC HARDWARE (HCC): Primary | ICD-10-CM

## 2019-08-26 ENCOUNTER — HOSPITAL ENCOUNTER (OUTPATIENT)
Dept: GENERAL RADIOLOGY | Age: 54
Discharge: HOME | End: 2019-08-28
Payer: COMMERCIAL

## 2019-08-26 ENCOUNTER — OFFICE VISIT (OUTPATIENT)
Dept: ORTHOPEDIC SURGERY | Age: 54
End: 2019-08-26
Payer: COMMERCIAL

## 2019-08-26 VITALS
HEART RATE: 67 BPM | RESPIRATION RATE: 18 BRPM | SYSTOLIC BLOOD PRESSURE: 107 MMHG | DIASTOLIC BLOOD PRESSURE: 65 MMHG | TEMPERATURE: 97.7 F

## 2019-08-26 DIAGNOSIS — T84.84XA PAINFUL ORTHOPAEDIC HARDWARE (HCC): Primary | ICD-10-CM

## 2019-08-26 DIAGNOSIS — T84.84XA PAINFUL ORTHOPAEDIC HARDWARE (HCC): ICD-10-CM

## 2019-08-26 PROCEDURE — 73110 X-RAY EXAM OF WRIST: CPT

## 2019-08-26 PROCEDURE — 99024 POSTOP FOLLOW-UP VISIT: CPT | Performed by: NURSE PRACTITIONER

## 2019-08-26 PROCEDURE — 99212 OFFICE O/P EST SF 10 MIN: CPT

## 2019-08-26 NOTE — PROGRESS NOTES
distribution  Able to make a full fist without pain. No crepitus over the left wrist with ROM. Incision well approximated with no redness, drainage or warmth. /65 (Site: Left Upper Arm, Position: Sitting, Cuff Size: Medium Adult)   Pulse 67   Temp 97.7 °F (36.5 °C) (Oral)   Resp 18     XR:  3 views of the left wrist demonstrating distal radius and ulna fractures in stable alignment. Fixation hardware removal. No acute fracture or dislocation. No other osseous abnormalities. Assessment:   Diagnosis Orders   1. Painful orthopaedic hardware [facility])       Discussion:    Plan:  Continue activity as tolerated. Wear your brace while running the  due to the vibration of the mower, otherwise brace for comfort only. If intermittent numbness and tingling get worse instead of better we can do further testing. Continue the Calcium and Vitamin D. Follow up in 6 weeks. 10- at 9 am    Electronically signed by LADI Buckley CNP on 8/26/2019 at 12:02 PM    Note: This report was completed using computerize voiced recognition Margaret 86 effort has been made to ensure accuracy; however, inadvertent computerized transcription errors may be present.

## 2019-08-30 DIAGNOSIS — E16.2 HYPOGLYCEMIA: ICD-10-CM

## 2019-08-30 RX ORDER — FLUOXETINE 10 MG/1
10 TABLET, FILM COATED ORAL 2 TIMES DAILY
Qty: 60 TABLET | Refills: 2 | Status: SHIPPED
Start: 2019-08-30 | End: 2020-02-12 | Stop reason: SDUPTHER

## 2019-08-30 RX ORDER — LEVOTHYROXINE SODIUM 0.07 MG/1
75 TABLET ORAL DAILY
Qty: 30 TABLET | Refills: 5 | Status: SHIPPED
Start: 2019-08-30 | End: 2020-03-06 | Stop reason: SDUPTHER

## 2019-09-07 ENCOUNTER — HOSPITAL ENCOUNTER (OUTPATIENT)
Age: 54
Discharge: HOME | End: 2019-09-07
Payer: COMMERCIAL

## 2019-09-07 LAB
ALBUMIN SERPL-MCNC: 4.6 G/DL (ref 3.5–5.2)
ALP BLD-CCNC: 55 U/L (ref 35–104)
ALT SERPL-CCNC: 19 U/L (ref 0–32)
ANION GAP SERPL CALCULATED.3IONS-SCNC: 11 MMOL/L (ref 7–16)
AST SERPL-CCNC: 28 U/L (ref 0–31)
BASOPHILS ABSOLUTE: 0.04 E9/L (ref 0–0.2)
BASOPHILS RELATIVE PERCENT: 0.9 % (ref 0–2)
BILIRUB SERPL-MCNC: 0.6 MG/DL (ref 0–1.2)
BUN BLDV-MCNC: 17 MG/DL (ref 6–20)
CALCIUM SERPL-MCNC: 10.1 MG/DL (ref 8.6–10.2)
CHLORIDE BLD-SCNC: 102 MMOL/L (ref 98–107)
CHOLESTEROL, TOTAL: 206 MG/DL (ref 0–199)
CO2: 29 MMOL/L (ref 22–29)
CREAT SERPL-MCNC: 0.9 MG/DL (ref 0.5–1)
EOSINOPHILS ABSOLUTE: 0.16 E9/L (ref 0.05–0.5)
EOSINOPHILS RELATIVE PERCENT: 3.6 % (ref 0–6)
GFR AFRICAN AMERICAN: >60
GFR NON-AFRICAN AMERICAN: >60 ML/MIN/1.73
GLUCOSE BLD-MCNC: 97 MG/DL (ref 74–99)
HCT VFR BLD CALC: 38.7 % (ref 34–48)
HDLC SERPL-MCNC: 74 MG/DL
HEMOGLOBIN: 12.6 G/DL (ref 11.5–15.5)
IMMATURE GRANULOCYTES #: 0.02 E9/L
IMMATURE GRANULOCYTES %: 0.4 % (ref 0–5)
LDL CHOLESTEROL CALCULATED: 120 MG/DL (ref 0–99)
LYMPHOCYTES ABSOLUTE: 0.88 E9/L (ref 1.5–4)
LYMPHOCYTES RELATIVE PERCENT: 19.8 % (ref 20–42)
MCH RBC QN AUTO: 32.1 PG (ref 26–35)
MCHC RBC AUTO-ENTMCNC: 32.6 % (ref 32–34.5)
MCV RBC AUTO: 98.7 FL (ref 80–99.9)
MONOCYTES ABSOLUTE: 0.32 E9/L (ref 0.1–0.95)
MONOCYTES RELATIVE PERCENT: 7.2 % (ref 2–12)
NEUTROPHILS ABSOLUTE: 3.03 E9/L (ref 1.8–7.3)
NEUTROPHILS RELATIVE PERCENT: 68.1 % (ref 43–80)
PDW BLD-RTO: 12.4 FL (ref 11.5–15)
PLATELET # BLD: 214 E9/L (ref 130–450)
PMV BLD AUTO: 8.7 FL (ref 7–12)
POTASSIUM SERPL-SCNC: 4.4 MMOL/L (ref 3.5–5)
RBC # BLD: 3.92 E12/L (ref 3.5–5.5)
SODIUM BLD-SCNC: 142 MMOL/L (ref 132–146)
T4 FREE: 1.17 NG/DL (ref 0.93–1.7)
TOTAL PROTEIN: 7.5 G/DL (ref 6.4–8.3)
TRIGL SERPL-MCNC: 61 MG/DL (ref 0–149)
TSH SERPL DL<=0.05 MIU/L-ACNC: 1.08 UIU/ML (ref 0.27–4.2)
VLDLC SERPL CALC-MCNC: 12 MG/DL
WBC # BLD: 4.5 E9/L (ref 4.5–11.5)

## 2019-09-07 PROCEDURE — 84443 ASSAY THYROID STIM HORMONE: CPT

## 2019-09-07 PROCEDURE — 80061 LIPID PANEL: CPT

## 2019-09-07 PROCEDURE — 84439 ASSAY OF FREE THYROXINE: CPT

## 2019-09-07 PROCEDURE — 36415 COLL VENOUS BLD VENIPUNCTURE: CPT

## 2019-09-07 PROCEDURE — 85025 COMPLETE CBC W/AUTO DIFF WBC: CPT

## 2019-09-07 PROCEDURE — 80053 COMPREHEN METABOLIC PANEL: CPT

## 2019-09-13 ENCOUNTER — OFFICE VISIT (OUTPATIENT)
Dept: PRIMARY CARE CLINIC | Age: 54
End: 2019-09-13
Payer: COMMERCIAL

## 2019-09-13 VITALS
WEIGHT: 165 LBS | HEART RATE: 92 BPM | SYSTOLIC BLOOD PRESSURE: 120 MMHG | BODY MASS INDEX: 28.32 KG/M2 | DIASTOLIC BLOOD PRESSURE: 76 MMHG | OXYGEN SATURATION: 98 % | TEMPERATURE: 97.5 F

## 2019-09-13 DIAGNOSIS — K21.9 GASTROESOPHAGEAL REFLUX DISEASE WITHOUT ESOPHAGITIS: ICD-10-CM

## 2019-09-13 DIAGNOSIS — Z00.00 ANNUAL PHYSICAL EXAM: Primary | ICD-10-CM

## 2019-09-13 DIAGNOSIS — E03.9 HYPOTHYROIDISM, UNSPECIFIED TYPE: ICD-10-CM

## 2019-09-13 DIAGNOSIS — R63.5 WEIGHT GAIN: ICD-10-CM

## 2019-09-13 DIAGNOSIS — K90.0 CELIAC DISEASE: ICD-10-CM

## 2019-09-13 DIAGNOSIS — F32.89 OTHER DEPRESSION: ICD-10-CM

## 2019-09-13 PROBLEM — F32.A DEPRESSION: Status: ACTIVE | Noted: 2019-09-13

## 2019-09-13 LAB
BILIRUBIN, POC: NORMAL
BLOOD URINE, POC: NORMAL
CLARITY, POC: CLEAR
COLOR, POC: YELLOW
GLUCOSE URINE, POC: NORMAL
KETONES, POC: NORMAL
LEUKOCYTE EST, POC: NORMAL
NITRITE, POC: NORMAL
PH, POC: 5.5
PROTEIN, POC: NORMAL
SPECIFIC GRAVITY, POC: <1.005
UROBILINOGEN, POC: 0.2

## 2019-09-13 PROCEDURE — 81002 URINALYSIS NONAUTO W/O SCOPE: CPT | Performed by: FAMILY MEDICINE

## 2019-09-13 PROCEDURE — 99214 OFFICE O/P EST MOD 30 MIN: CPT | Performed by: FAMILY MEDICINE

## 2019-09-13 ASSESSMENT — ENCOUNTER SYMPTOMS
GASTROINTESTINAL NEGATIVE: 1
ALLERGIC/IMMUNOLOGIC NEGATIVE: 1
RESPIRATORY NEGATIVE: 1
EYES NEGATIVE: 1

## 2019-09-13 NOTE — PROGRESS NOTES
19     Elizabeth Simmnos    : 1965 Sex: female   Age: 53 y.o. Chief Complaint   Patient presents with    Annual Exam    Discuss Labs       Prior to Admission medications    Medication Sig Start Date End Date Taking? Authorizing Provider   levothyroxine (SYNTHROID) 75 MCG tablet Take 1 tablet by mouth Daily 19  Yes Luis Miguel Kern DO   FLUoxetine (PROZAC) 10 MG tablet Take 1 tablet by mouth 2 times daily 19  Yes Luis Miguel Kern, DO   esomeprazole (NEXIUM) 40 MG delayed release capsule Take 1 capsule by mouth every morning (before breakfast) 19  Yes Luis Miguel Kern, DO   calcium citrate (CALCITRATE) 250 MG TABS tablet Take 1 tablet by mouth 2 times daily 19  Yes Nuria George PA-C   Cholecalciferol (VITAMIN D-3) 5000 UNITS TABS Take by mouth daily Instructed to hold   Yes Historical Provider, MD          HPI: Patient is seen today for annual physical patient is hypothyroid reflux disease depression anxiety celiac disease and weight gain. Medications are well-tolerated. Thyroid weight gain discussed she would like to consult with Dr. Sarika Montemayor and we will arrange. Review of Systems   Constitutional: Negative. HENT: Negative. Eyes: Negative. Respiratory: Negative. Gastrointestinal: Negative. Endocrine: Negative. Genitourinary: Negative. Musculoskeletal: Negative. Skin: Negative. Allergic/Immunologic: Negative. Neurological: Negative. Hematological: Negative. Psychiatric/Behavioral: Negative. Systems review currently stable aside from concerns over weight gain. Will arrange endocrinology evaluation and recommendations. Thyroid numbers are excellent. Continue current dosing.           Current Outpatient Medications:     levothyroxine (SYNTHROID) 75 MCG tablet, Take 1 tablet by mouth Daily, Disp: 30 tablet, Rfl: 5    FLUoxetine (PROZAC) 10 MG tablet, Take 1 tablet by mouth 2 times daily, Disp: 60 tablet, Rfl: 2   esomeprazole (NEXIUM) 40 MG delayed release capsule, Take 1 capsule by mouth every morning (before breakfast), Disp: 30 capsule, Rfl: 5    calcium citrate (CALCITRATE) 250 MG TABS tablet, Take 1 tablet by mouth 2 times daily, Disp: 60 tablet, Rfl: 2    Cholecalciferol (VITAMIN D-3) 5000 UNITS TABS, Take by mouth daily Instructed to hold, Disp: , Rfl:     Allergies   Allergen Reactions    Latex     Prednisone        Social History     Tobacco Use    Smoking status: Never Smoker    Smokeless tobacco: Never Used   Substance Use Topics    Alcohol use: Yes     Comment: social    Drug use: No      Past Surgical History:   Procedure Laterality Date    COLONOSCOPY  1/2015   [facility] DILATION AND CURETTAGE OF UTERUS  2007, 2006    HYSTERECTOMY  2008   [address] [facility], [address] [place] [address] At [facility]    x 4    OTHER SURGICAL HISTORY  10/8/15    bilateral tmj arthrodesis/steroid injection    UPPER GASTROINTESTINAL ENDOSCOPY  12/17/2014    UPPER GASTROINTESTINAL ENDOSCOPY  04/12/2017    UPPER GASTROINTESTINAL ENDOSCOPY N/A 7/13/2018    EGD BIOPSY performed by Lo Eng MD at [address] [place] [address] Left 1/16/2019    LEFT WRIST OPEN REDUCTION INTERNAL FIXATION performed by Jeniffer Lowry DO at [facility] Left 7/19/2019    LEFT WRIST  REMOVAL OF HARDWARE performed by Jeniffer Lowry DO at [facility] OR     Family History   Problem Relation Age of Onset    High Blood Pressure Mother     High Cholesterol Mother     Diabetes Father     Obesity Father     Thyroid Disease Father     Stroke Father     High Cholesterol Father     High Blood Pressure Father     Heart Disease Father     Kidney Disease Father     Thyroid Disease Sister     Celiac Disease Sister     Thyroid Disease Brother     Diabetes Brother     High Blood Pressure Brother     Heart Disease Maternal Grandmother     High Blood Pressure Maternal Grandmother     Diabetes Maternal Grandmother    Carlos A Mendez

## 2019-10-13 PROBLEM — Z00.00 ANNUAL PHYSICAL EXAM: Status: RESOLVED | Noted: 2019-09-13 | Resolved: 2019-10-13

## 2019-10-17 ENCOUNTER — HOSPITAL ENCOUNTER (OUTPATIENT)
Dept: GENERAL RADIOLOGY | Age: 54
Discharge: HOME | End: 2019-10-19
Payer: COMMERCIAL

## 2019-10-17 ENCOUNTER — OFFICE VISIT (OUTPATIENT)
Dept: ORTHOPEDIC SURGERY | Age: 54
End: 2019-10-17
Payer: COMMERCIAL

## 2019-10-17 VITALS — DIASTOLIC BLOOD PRESSURE: 66 MMHG | SYSTOLIC BLOOD PRESSURE: 105 MMHG | HEART RATE: 66 BPM

## 2019-10-17 DIAGNOSIS — T84.84XA PAINFUL ORTHOPAEDIC HARDWARE (HCC): ICD-10-CM

## 2019-10-17 DIAGNOSIS — T84.84XA PAINFUL ORTHOPAEDIC HARDWARE (HCC): Primary | ICD-10-CM

## 2019-10-17 PROCEDURE — 73110 X-RAY EXAM OF WRIST: CPT

## 2019-10-17 PROCEDURE — 99212 OFFICE O/P EST SF 10 MIN: CPT

## 2019-10-17 PROCEDURE — 99024 POSTOP FOLLOW-UP VISIT: CPT | Performed by: NURSE PRACTITIONER

## 2019-10-22 ENCOUNTER — HOSPITAL ENCOUNTER (OUTPATIENT)
Dept: GENERAL RADIOLOGY | Age: 54
Discharge: HOME | End: 2019-10-24
Payer: COMMERCIAL

## 2019-10-22 DIAGNOSIS — Z13.820 OSTEOPOROSIS SCREENING: ICD-10-CM

## 2019-10-22 DIAGNOSIS — Z12.39 BREAST CANCER SCREENING: ICD-10-CM

## 2019-10-22 PROCEDURE — 77063 BREAST TOMOSYNTHESIS BI: CPT

## 2019-10-22 PROCEDURE — 77080 DXA BONE DENSITY AXIAL: CPT

## 2019-10-24 ENCOUNTER — TELEPHONE (OUTPATIENT)
Dept: GENERAL RADIOLOGY | Age: 54
End: 2019-10-24

## 2019-10-24 ENCOUNTER — OFFICE VISIT (OUTPATIENT)
Dept: ORTHOPEDIC SURGERY | Age: 54
End: 2019-10-24
Payer: COMMERCIAL

## 2019-10-24 VITALS
BODY MASS INDEX: 27.31 KG/M2 | HEART RATE: 77 BPM | SYSTOLIC BLOOD PRESSURE: 103 MMHG | HEIGHT: 64 IN | WEIGHT: 160 LBS | DIASTOLIC BLOOD PRESSURE: 70 MMHG

## 2019-10-24 DIAGNOSIS — R22.32 MASS OF LEFT WRIST: ICD-10-CM

## 2019-10-24 DIAGNOSIS — T84.84XA PAINFUL ORTHOPAEDIC HARDWARE (HCC): Primary | ICD-10-CM

## 2019-10-24 PROCEDURE — 25075 EXC FOREARM LES SC < 3 CM: CPT | Performed by: ORTHOPAEDIC SURGERY

## 2019-10-24 PROCEDURE — 99212 OFFICE O/P EST SF 10 MIN: CPT

## 2019-10-24 PROCEDURE — 99213 OFFICE O/P EST LOW 20 MIN: CPT | Performed by: ORTHOPAEDIC SURGERY

## 2019-10-25 ENCOUNTER — HOSPITAL ENCOUNTER (OUTPATIENT)
Dept: GENERAL RADIOLOGY | Age: 54
Discharge: HOME | End: 2019-10-27
Payer: COMMERCIAL

## 2019-10-25 ENCOUNTER — HOSPITAL ENCOUNTER (OUTPATIENT)
Age: 54
Discharge: HOME | End: 2019-10-27
Payer: COMMERCIAL

## 2019-10-25 DIAGNOSIS — R92.8 ABNORMAL MAMMOGRAM: ICD-10-CM

## 2019-10-25 PROCEDURE — 76642 ULTRASOUND BREAST LIMITED: CPT

## 2019-10-25 PROCEDURE — G0279 TOMOSYNTHESIS, MAMMO: HCPCS

## 2019-10-25 PROCEDURE — 88175 CYTOPATH C/V AUTO FLUID REDO: CPT

## 2019-10-25 PROCEDURE — 87624 HPV HI-RISK TYP POOLED RSLT: CPT

## 2019-11-01 LAB
HPV SAMPLE: ABNORMAL
HPV TYPE 16: DETECTED
HPV TYPE 18: NOT DETECTED
HPV, HIGH RISK OTHER: DETECTED
INTERPRETATION: ABNORMAL
SOURCE: ABNORMAL

## 2019-11-07 ENCOUNTER — OFFICE VISIT (OUTPATIENT)
Dept: ENDOCRINOLOGY | Age: 54
End: 2019-11-07
Payer: COMMERCIAL

## 2019-11-07 ENCOUNTER — OFFICE VISIT (OUTPATIENT)
Dept: ORTHOPEDIC SURGERY | Age: 54
End: 2019-11-07
Payer: COMMERCIAL

## 2019-11-07 VITALS
HEIGHT: 64 IN | HEART RATE: 68 BPM | SYSTOLIC BLOOD PRESSURE: 128 MMHG | OXYGEN SATURATION: 98 % | DIASTOLIC BLOOD PRESSURE: 78 MMHG | WEIGHT: 166.6 LBS | BODY MASS INDEX: 28.44 KG/M2 | RESPIRATION RATE: 16 BRPM

## 2019-11-07 VITALS — SYSTOLIC BLOOD PRESSURE: 103 MMHG | HEART RATE: 63 BPM | DIASTOLIC BLOOD PRESSURE: 65 MMHG | TEMPERATURE: 97 F

## 2019-11-07 DIAGNOSIS — R22.32 MASS OF LEFT WRIST: Primary | ICD-10-CM

## 2019-11-07 DIAGNOSIS — E03.9 HYPOTHYROIDISM, UNSPECIFIED TYPE: Primary | ICD-10-CM

## 2019-11-07 PROCEDURE — 99204 OFFICE O/P NEW MOD 45 MIN: CPT | Performed by: INTERNAL MEDICINE

## 2019-11-07 PROCEDURE — 99024 POSTOP FOLLOW-UP VISIT: CPT | Performed by: ORTHOPAEDIC SURGERY

## 2019-11-07 PROCEDURE — 99212 OFFICE O/P EST SF 10 MIN: CPT

## 2019-12-19 ENCOUNTER — OFFICE VISIT (OUTPATIENT)
Dept: PRIMARY CARE CLINIC | Age: 54
End: 2019-12-19
Payer: COMMERCIAL

## 2019-12-19 VITALS
TEMPERATURE: 97.7 F | RESPIRATION RATE: 16 BRPM | WEIGHT: 166.8 LBS | HEART RATE: 65 BPM | BODY MASS INDEX: 28.63 KG/M2 | SYSTOLIC BLOOD PRESSURE: 106 MMHG | DIASTOLIC BLOOD PRESSURE: 62 MMHG | OXYGEN SATURATION: 98 %

## 2019-12-19 DIAGNOSIS — E78.5 HYPERLIPIDEMIA, UNSPECIFIED HYPERLIPIDEMIA TYPE: ICD-10-CM

## 2019-12-19 DIAGNOSIS — K21.9 GASTROESOPHAGEAL REFLUX DISEASE WITHOUT ESOPHAGITIS: ICD-10-CM

## 2019-12-19 DIAGNOSIS — E03.9 HYPOTHYROIDISM, UNSPECIFIED TYPE: Primary | ICD-10-CM

## 2019-12-19 DIAGNOSIS — K90.0 CELIAC DISEASE: ICD-10-CM

## 2019-12-19 DIAGNOSIS — F32.89 OTHER DEPRESSION: ICD-10-CM

## 2019-12-19 PROCEDURE — 99214 OFFICE O/P EST MOD 30 MIN: CPT | Performed by: FAMILY MEDICINE

## 2019-12-19 ASSESSMENT — ENCOUNTER SYMPTOMS
RESPIRATORY NEGATIVE: 1
EYES NEGATIVE: 1
GASTROINTESTINAL NEGATIVE: 1
ALLERGIC/IMMUNOLOGIC NEGATIVE: 1

## 2020-02-12 RX ORDER — FLUOXETINE 10 MG/1
10 TABLET, FILM COATED ORAL 2 TIMES DAILY
Qty: 60 TABLET | Refills: 2 | Status: SHIPPED
Start: 2020-02-12 | End: 2020-03-20 | Stop reason: SDUPTHER

## 2020-03-05 ENCOUNTER — EMPLOYEE WELLNESS (OUTPATIENT)
Dept: OTHER | Age: 55
End: 2020-03-05

## 2020-03-05 LAB
CHOLESTEROL, TOTAL: 186 MG/DL (ref 0–199)
GLUCOSE BLD-MCNC: 89 MG/DL (ref 74–107)
HDLC SERPL-MCNC: 74 MG/DL
LDL CHOLESTEROL CALCULATED: 97 MG/DL (ref 0–99)
TRIGL SERPL-MCNC: 76 MG/DL (ref 0–149)

## 2020-03-06 RX ORDER — LEVOTHYROXINE SODIUM 0.07 MG/1
75 TABLET ORAL DAILY
Qty: 30 TABLET | Refills: 5 | Status: SHIPPED
Start: 2020-03-06 | End: 2020-07-24 | Stop reason: SDUPTHER

## 2020-03-08 LAB
3-OH-COTININE: <2 NG/ML
COTININE: <2 NG/ML
NICOTINE: <2 NG/ML

## 2020-03-14 ENCOUNTER — HOSPITAL ENCOUNTER (OUTPATIENT)
Age: 55
Discharge: HOME | End: 2020-03-14
Payer: COMMERCIAL

## 2020-03-14 LAB
ALBUMIN SERPL-MCNC: 4.4 G/DL (ref 3.5–5.2)
ALP BLD-CCNC: 48 U/L (ref 35–104)
ALT SERPL-CCNC: 15 U/L (ref 0–32)
ANION GAP SERPL CALCULATED.3IONS-SCNC: 9 MMOL/L (ref 7–16)
AST SERPL-CCNC: 19 U/L (ref 0–31)
BASOPHILS ABSOLUTE: 0.03 E9/L (ref 0–0.2)
BASOPHILS RELATIVE PERCENT: 0.8 % (ref 0–2)
BILIRUB SERPL-MCNC: 0.4 MG/DL (ref 0–1.2)
BUN BLDV-MCNC: 23 MG/DL (ref 6–20)
CALCIUM SERPL-MCNC: 10 MG/DL (ref 8.6–10.2)
CHLORIDE BLD-SCNC: 107 MMOL/L (ref 98–107)
CHOLESTEROL, TOTAL: 184 MG/DL (ref 0–199)
CO2: 27 MMOL/L (ref 22–29)
CREAT SERPL-MCNC: 0.9 MG/DL (ref 0.5–1)
EOSINOPHILS ABSOLUTE: 0.2 E9/L (ref 0.05–0.5)
EOSINOPHILS RELATIVE PERCENT: 5.5 % (ref 0–6)
GFR AFRICAN AMERICAN: >60
GFR NON-AFRICAN AMERICAN: >60 ML/MIN/1.73
GLUCOSE BLD-MCNC: 91 MG/DL (ref 74–99)
HCT VFR BLD CALC: 38.6 % (ref 34–48)
HDLC SERPL-MCNC: 71 MG/DL
HEMOGLOBIN: 12.6 G/DL (ref 11.5–15.5)
IMMATURE GRANULOCYTES #: 0.01 E9/L
IMMATURE GRANULOCYTES %: 0.3 % (ref 0–5)
LDL CHOLESTEROL CALCULATED: 104 MG/DL (ref 0–99)
LYMPHOCYTES ABSOLUTE: 0.91 E9/L (ref 1.5–4)
LYMPHOCYTES RELATIVE PERCENT: 24.9 % (ref 20–42)
MCH RBC QN AUTO: 31.9 PG (ref 26–35)
MCHC RBC AUTO-ENTMCNC: 32.6 % (ref 32–34.5)
MCV RBC AUTO: 97.7 FL (ref 80–99.9)
MONOCYTES ABSOLUTE: 0.28 E9/L (ref 0.1–0.95)
MONOCYTES RELATIVE PERCENT: 7.7 % (ref 2–12)
NEUTROPHILS ABSOLUTE: 2.23 E9/L (ref 1.8–7.3)
NEUTROPHILS RELATIVE PERCENT: 60.8 % (ref 43–80)
PDW BLD-RTO: 12 FL (ref 11.5–15)
PLATELET # BLD: 227 E9/L (ref 130–450)
PMV BLD AUTO: 9.2 FL (ref 7–12)
POTASSIUM SERPL-SCNC: 4.6 MMOL/L (ref 3.5–5)
RBC # BLD: 3.95 E12/L (ref 3.5–5.5)
SODIUM BLD-SCNC: 143 MMOL/L (ref 132–146)
T4 TOTAL: 7.9 MCG/DL (ref 4.5–11.7)
TOTAL PROTEIN: 7.2 G/DL (ref 6.4–8.3)
TRIGL SERPL-MCNC: 44 MG/DL (ref 0–149)
TSH SERPL DL<=0.05 MIU/L-ACNC: 0.98 UIU/ML (ref 0.27–4.2)
VLDLC SERPL CALC-MCNC: 9 MG/DL
WBC # BLD: 3.7 E9/L (ref 4.5–11.5)

## 2020-03-14 PROCEDURE — 84443 ASSAY THYROID STIM HORMONE: CPT

## 2020-03-14 PROCEDURE — 84436 ASSAY OF TOTAL THYROXINE: CPT

## 2020-03-14 PROCEDURE — 80053 COMPREHEN METABOLIC PANEL: CPT

## 2020-03-14 PROCEDURE — 85025 COMPLETE CBC W/AUTO DIFF WBC: CPT

## 2020-03-14 PROCEDURE — 80061 LIPID PANEL: CPT

## 2020-03-14 PROCEDURE — 36415 COLL VENOUS BLD VENIPUNCTURE: CPT

## 2020-03-20 ENCOUNTER — OFFICE VISIT (OUTPATIENT)
Dept: PRIMARY CARE CLINIC | Age: 55
End: 2020-03-20
Payer: COMMERCIAL

## 2020-03-20 VITALS
WEIGHT: 167 LBS | BODY MASS INDEX: 28.67 KG/M2 | OXYGEN SATURATION: 98 % | SYSTOLIC BLOOD PRESSURE: 120 MMHG | HEART RATE: 84 BPM | TEMPERATURE: 98.7 F | DIASTOLIC BLOOD PRESSURE: 78 MMHG

## 2020-03-20 PROBLEM — R73.01 IMPAIRED FASTING GLUCOSE: Status: ACTIVE | Noted: 2020-03-20

## 2020-03-20 PROBLEM — E16.2 HYPOGLYCEMIA: Status: ACTIVE | Noted: 2020-03-20

## 2020-03-20 PROCEDURE — 99214 OFFICE O/P EST MOD 30 MIN: CPT | Performed by: FAMILY MEDICINE

## 2020-03-20 RX ORDER — FLUOXETINE 10 MG/1
10 TABLET, FILM COATED ORAL 2 TIMES DAILY
Qty: 60 TABLET | Refills: 5 | Status: SHIPPED | OUTPATIENT
Start: 2020-03-20 | End: 2021-08-13

## 2020-03-20 NOTE — PROGRESS NOTES
3/20/20     Tiera Dhaliwal    : 1965 Sex: female   Age: 53 y.o. Chief Complaint   Patient presents with    Hypothyroidism    Discuss Labs       Prior to Admission medications    Medication Sig Start Date End Date Taking? Authorizing Provider   FLUoxetine (PROZAC) 10 MG tablet Take 1 tablet by mouth 2 times daily 3/20/20  Yes Mariluz Kern DO   levothyroxine (SYNTHROID) 75 MCG tablet Take 1 tablet by mouth Daily 3/6/20  Yes Mariluz Kern DO   esomeprazole (NEXIUM) 40 MG delayed release capsule Take 1 capsule by mouth every morning (before breakfast) 19  Yes Mariluz Kern DO   calcium citrate (CALCITRATE) 250 MG TABS tablet Take 1 tablet by mouth 2 times daily  Patient taking differently: Take 250 mg by mouth daily  19  Yes Deena Patel PA-C   Cholecalciferol (VITAMIN D-3) 5000 UNITS TABS Take by mouth daily Instructed to hold   Yes Historical Provider, MD          HPI: Patient is seen today to follow-up on hypothyroidism hyperglycemia hyperlipidemia depression anxiety impaired fasting glucose. Stable current medications and treatment. Review of Systems   Constitutional: Negative. HENT: Negative. Eyes: Negative. Respiratory: Negative. Gastrointestinal: Negative. Endocrine: Negative. Genitourinary: Negative. Musculoskeletal: Negative. Skin: Negative. Allergic/Immunologic: Negative. Neurological: Negative. Hematological: Negative. Psychiatric/Behavioral: Negative. Systems review is all stable as noted. Present vitals physical examination stable.       Current Outpatient Medications:     FLUoxetine (PROZAC) 10 MG tablet, Take 1 tablet by mouth 2 times daily, Disp: 60 tablet, Rfl: 5    levothyroxine (SYNTHROID) 75 MCG tablet, Take 1 tablet by mouth Daily, Disp: 30 tablet, Rfl: 5    esomeprazole (NEXIUM) 40 MG delayed release capsule, Take 1 capsule by mouth every morning (before breakfast), Disp: 30 capsule, Rfl: 5   calcium citrate (CALCITRATE) 250 MG TABS tablet, Take 1 tablet by mouth 2 times daily (Patient taking differently: Take 250 mg by mouth daily ), Disp: 60 tablet, Rfl: 2    Cholecalciferol (VITAMIN D-3) 5000 UNITS TABS, Take by mouth daily Instructed to hold, Disp: , Rfl:     Allergies   Allergen Reactions    Latex     Prednisone        Social History     Tobacco Use    Smoking status: Never Smoker    Smokeless tobacco: Never Used   Substance Use Topics    Alcohol use: Yes     Comment: social    Drug use: No      Past Surgical History:   Procedure Laterality Date    COLONOSCOPY  1/2015   [facility] DILATION AND CURETTAGE OF UTERUS  2007, 2006    HYSTERECTOMY  2008   [address] [facility] [facility]    x 4    OTHER SURGICAL HISTORY  10/8/15    bilateral tmj arthrodesis/steroid injection    UPPER GASTROINTESTINAL ENDOSCOPY  12/17/2014    UPPER GASTROINTESTINAL ENDOSCOPY  04/12/2017    UPPER GASTROINTESTINAL ENDOSCOPY N/A 7/13/2018    EGD BIOPSY performed by Marta Burnett MD at [address] [place] [address] Left 1/16/2019    LEFT WRIST OPEN REDUCTION INTERNAL FIXATION performed by Sara Lozano DO at [facility] Left 7/19/2019    LEFT WRIST  REMOVAL OF HARDWARE performed by Sara Lozano DO at [facility] OR     Family History   Problem Relation Age of Onset    High Blood Pressure Mother     High Cholesterol Mother     Diabetes Father     Obesity Father     Thyroid Disease Father     Stroke Father     High Cholesterol Father     High Blood Pressure Father     Heart Disease Father         CHF    Kidney Disease Father     Heart Attack Father     Thyroid Disease Sister     Celiac Disease Sister     Thyroid Disease Brother     Diabetes Brother     High Blood Pressure Brother     Heart Disease Maternal Grandmother     High Blood Pressure Maternal Grandmother     Diabetes Maternal Grandmother     Diabetes Maternal Grandfather     High Blood Pressure Maternal Grandfather     Thyroid Disease Paternal Grandmother     Heart Disease Paternal Grandfather     Diabetes Maternal Aunt     Diabetes Maternal Uncle      Past Medical History:   Diagnosis Date    Anemia     Anxiety     Celiac disease     Difficult intubation     Intubated successfully 1/2009 with Glidescope #3, 7.0 ETT.  Eating disorder 10 years ago    dimitris, Dr. Christian Lucia monitoring.  History of blood transfusion 1993.  Hypoglycemia     Hypothyroidism     Latex allergy, contact dermatitis     PONV (postoperative nausea and vomiting)     TMJ (dislocation of temporomandibular joint)     Vitamin D deficiency     follows with Dr. Sandy Euceda:    03/20/20 0920   BP: 120/78   Pulse: 84   Temp: 98.7 °F (37.1 °C)   SpO2: 98%   Weight: 167 lb (75.8 kg)     BP Readings from Last 3 Encounters:   03/20/20 120/78   12/19/19 106/62   11/07/19 103/65        Physical Exam  Vitals signs and nursing note reviewed. Constitutional:       Appearance: She is well-developed. HENT:      Head: Normocephalic. Right Ear: External ear normal.      Left Ear: External ear normal.      Nose: Nose normal.   Eyes:      Conjunctiva/sclera: Conjunctivae normal.      Pupils: Pupils are equal, round, and reactive to light. Neck:      Musculoskeletal: Normal range of motion and neck supple. Cardiovascular:      Rate and Rhythm: Normal rate. Pulmonary:      Breath sounds: Normal breath sounds. Abdominal:      General: Bowel sounds are normal.      Palpations: Abdomen is soft. Musculoskeletal: Normal range of motion. Skin:     General: Skin is warm and dry. Neurological:      Mental Status: She is alert and oriented to person, place, and time. Psychiatric:         Behavior: Behavior normal.     Present vitals physical examination stable. We will maintain current care. Reassessment in 3 months blood work again in 6.           Plan Per Assessment:  Tawny Cordero was seen today for hypothyroidism and discuss labs. Diagnoses and all orders for this visit:    Hypothyroidism, unspecified type    Hypoglycemia  -     FLUoxetine (PROZAC) 10 MG tablet; Take 1 tablet by mouth 2 times daily    Hyperlipidemia, unspecified hyperlipidemia type    Other depression    Impaired fasting glucose            Return in about 3 months (around 6/20/2020). Radha Ochoa DO    Note was generated with the assistance of voice recognition software. Document was reviewed however may contain grammatical errors.

## 2020-06-26 ENCOUNTER — OFFICE VISIT (OUTPATIENT)
Dept: PRIMARY CARE CLINIC | Age: 55
End: 2020-06-26
Payer: COMMERCIAL

## 2020-06-26 VITALS
BODY MASS INDEX: 29.01 KG/M2 | OXYGEN SATURATION: 98 % | SYSTOLIC BLOOD PRESSURE: 120 MMHG | HEART RATE: 79 BPM | DIASTOLIC BLOOD PRESSURE: 78 MMHG | WEIGHT: 169 LBS

## 2020-06-26 PROBLEM — R51.9 NONINTRACTABLE EPISODIC HEADACHE: Status: ACTIVE | Noted: 2020-06-26

## 2020-06-26 PROCEDURE — 99214 OFFICE O/P EST MOD 30 MIN: CPT | Performed by: FAMILY MEDICINE

## 2020-06-26 RX ORDER — VIT C/B6/B5/MAGNESIUM/HERB 173 50-5-6-5MG
CAPSULE ORAL
COMMUNITY
End: 2021-03-05 | Stop reason: CLARIF

## 2020-06-26 RX ORDER — IBUPROFEN 800 MG/1
800 TABLET ORAL 2 TIMES DAILY PRN
Qty: 60 TABLET | Refills: 1 | Status: SHIPPED
Start: 2020-06-26 | End: 2021-02-05 | Stop reason: SDUPTHER

## 2020-06-26 ASSESSMENT — ENCOUNTER SYMPTOMS
RESPIRATORY NEGATIVE: 1
ALLERGIC/IMMUNOLOGIC NEGATIVE: 1
GASTROINTESTINAL NEGATIVE: 1
EYES NEGATIVE: 1

## 2020-06-26 NOTE — PROGRESS NOTES
20     Kvng Sarmiento    : 1965 Sex: female   Age: 63 y.o. Chief Complaint   Patient presents with    Anxiety    Hypothyroidism    Headache     almost daily since February       Prior to Admission medications    Medication Sig Start Date End Date Taking? Authorizing Provider   Turmeric 500 MG CAPS Take by mouth   Yes Historical Provider, MD   ibuprofen (ADVIL;MOTRIN) 800 MG tablet Take 1 tablet by mouth 2 times daily as needed for Pain 20  Yes Jimi Kern DO   FLUoxetine (PROZAC) 10 MG tablet Take 1 tablet by mouth 2 times daily 3/20/20  Yes Jimi Kern DO   levothyroxine (SYNTHROID) 75 MCG tablet Take 1 tablet by mouth Daily 3/6/20  Yes Jimi Kern DO   esomeprazole (NEXIUM) 40 MG delayed release capsule Take 1 capsule by mouth every morning (before breakfast) 19  Yes Jimi Kern DO   calcium citrate (CALCITRATE) 250 MG TABS tablet Take 1 tablet by mouth 2 times daily  Patient taking differently: Take 250 mg by mouth daily  19  Yes Teodoro Levy PA-C   Cholecalciferol (VITAMIN D-3) 5000 UNITS TABS Take by mouth daily Instructed to hold   Yes Historical Provider, MD          HPI: Patient evaluated today for hypothyroid hyperlipidemia impaired fasting glucose depressive symptoms all of which have been stable. Today's primary complaint intermittent headaches over the past few months and some questionable visual change. I have recommended follow-up with ophthalmology. Then will reassess and if persistent symptoms would consider MRI study of the head. Neurologically she is intact here in the office. Medications reviewed and will maintain as prescribed. Review of Systems   Constitutional: Negative. HENT: Negative. Eyes: Negative. Respiratory: Negative. Gastrointestinal: Negative. Endocrine: Negative. Genitourinary: Negative. Musculoskeletal: Negative. Skin: Negative. Allergic/Immunologic: Negative. Neurological: Negative. Hematological: Negative. Psychiatric/Behavioral: Negative. Systems review overall stable. Intermittent headaches as noted in HPI. Further work-up with ophthalmology and then if necessary possible MRI study of the head and neurologic evaluation.         Current Outpatient Medications:     Turmeric 500 MG CAPS, Take by mouth, Disp: , Rfl:     ibuprofen (ADVIL;MOTRIN) 800 MG tablet, Take 1 tablet by mouth 2 times daily as needed for Pain, Disp: 60 tablet, Rfl: 1    FLUoxetine (PROZAC) 10 MG tablet, Take 1 tablet by mouth 2 times daily, Disp: 60 tablet, Rfl: 5    levothyroxine (SYNTHROID) 75 MCG tablet, Take 1 tablet by mouth Daily, Disp: 30 tablet, Rfl: 5    esomeprazole (NEXIUM) 40 MG delayed release capsule, Take 1 capsule by mouth every morning (before breakfast), Disp: 30 capsule, Rfl: 5    calcium citrate (CALCITRATE) 250 MG TABS tablet, Take 1 tablet by mouth 2 times daily (Patient taking differently: Take 250 mg by mouth daily ), Disp: 60 tablet, Rfl: 2    Cholecalciferol (VITAMIN D-3) 5000 UNITS TABS, Take by mouth daily Instructed to hold, Disp: , Rfl:     Allergies   Allergen Reactions    Latex     Prednisone        Social History     Tobacco Use    Smoking status: Never Smoker    Smokeless tobacco: Never Used   Substance Use Topics    Alcohol use: Yes     Comment: social    Drug use: No      Past Surgical History:   Procedure Laterality Date    COLONOSCOPY  1/2015    DILATION AND CURETTAGE OF UTERUS  2007, 2006    HYSTERECTOMY  2008   [facility], [address] [place] [address], 1990    x 4    OTHER SURGICAL HISTORY  10/8/15    bilateral tmj arthrodesis/steroid injection    UPPER GASTROINTESTINAL ENDOSCOPY  12/17/2014    UPPER GASTROINTESTINAL ENDOSCOPY  04/12/2017    UPPER GASTROINTESTINAL ENDOSCOPY N/A 7/13/2018    EGD BIOPSY performed by Tao Dimas MD at [address] Left 1/16/2019    LEFT WRIST OPEN REDUCTION INTERNAL

## 2020-07-24 ENCOUNTER — OFFICE VISIT (OUTPATIENT)
Dept: PRIMARY CARE CLINIC | Age: 55
End: 2020-07-24
Payer: COMMERCIAL

## 2020-07-24 VITALS
HEART RATE: 63 BPM | TEMPERATURE: 97.7 F | OXYGEN SATURATION: 98 % | BODY MASS INDEX: 29.18 KG/M2 | DIASTOLIC BLOOD PRESSURE: 72 MMHG | SYSTOLIC BLOOD PRESSURE: 108 MMHG | WEIGHT: 170 LBS

## 2020-07-24 PROBLEM — G89.29 CHRONIC NONINTRACTABLE HEADACHE: Status: ACTIVE | Noted: 2020-06-26

## 2020-07-24 PROBLEM — R26.89 BALANCE DISORDER: Status: ACTIVE | Noted: 2020-07-24

## 2020-07-24 PROCEDURE — 99214 OFFICE O/P EST MOD 30 MIN: CPT | Performed by: FAMILY MEDICINE

## 2020-07-24 RX ORDER — ESOMEPRAZOLE MAGNESIUM 40 MG/1
40 CAPSULE, DELAYED RELEASE ORAL
Qty: 30 CAPSULE | Refills: 5 | Status: SHIPPED
Start: 2020-07-24 | End: 2020-10-19

## 2020-07-24 RX ORDER — LEVOTHYROXINE SODIUM 0.07 MG/1
75 TABLET ORAL DAILY
Qty: 30 TABLET | Refills: 5 | Status: SHIPPED
Start: 2020-07-24 | End: 2021-02-11

## 2020-07-24 ASSESSMENT — ENCOUNTER SYMPTOMS
EYES NEGATIVE: 1
RESPIRATORY NEGATIVE: 1
GASTROINTESTINAL NEGATIVE: 1
ALLERGIC/IMMUNOLOGIC NEGATIVE: 1

## 2020-07-24 NOTE — PROGRESS NOTES
20     Marisela Burrell    : 1965 Sex: female   Age: 63 y.o. Chief Complaint   Patient presents with    Headache     saw eye doctor and still has persistent headaches       Prior to Admission medications    Medication Sig Start Date End Date Taking? Authorizing Provider   esomeprazole (NEXIUM) 40 MG delayed release capsule Take 1 capsule by mouth every morning (before breakfast) 20  Yes Colton Feeling, DO   levothyroxine (SYNTHROID) 75 MCG tablet Take 1 tablet by mouth Daily 20  Yes Trevor Kern DO   Turmeric 500 MG CAPS Take by mouth   Yes Historical Provider, MD   ibuprofen (ADVIL;MOTRIN) 800 MG tablet Take 1 tablet by mouth 2 times daily as needed for Pain 20  Yes Trevor Kern DO   FLUoxetine (PROZAC) 10 MG tablet Take 1 tablet by mouth 2 times daily 3/20/20  Yes Trevor Kern DO   calcium citrate (CALCITRATE) 250 MG TABS tablet Take 1 tablet by mouth 2 times daily  Patient taking differently: Take 250 mg by mouth daily  19  Yes Cal Shah PA-C   Cholecalciferol (VITAMIN D-3) 5000 UNITS TABS Take by mouth daily Instructed to hold   Yes Historical Provider, MD          HPI: Patient evaluated today issues of reflux disease hypothyroid celiac disease now with chronic headaches and balance disorder associated. Has had a couple falls at home by history. Neurologically otherwise has been stable. We will assess MRI study and then plan further follow-up. Review of Systems   Constitutional: Negative. HENT: Negative. Eyes: Negative. Respiratory: Negative. Gastrointestinal: Negative. Endocrine: Negative. Genitourinary: Negative. Musculoskeletal: Negative. Skin: Negative. Allergic/Immunologic: Negative. Neurological: Positive for dizziness, weakness and light-headedness. Hematological: Negative. Psychiatric/Behavioral: Negative.                Current Outpatient Medications:     esomeprazole (NEXIUM) 40 MG delayed release capsule, Take 1 capsule by mouth every morning (before breakfast), Disp: 30 capsule, Rfl: 5    levothyroxine (SYNTHROID) 75 MCG tablet, Take 1 tablet by mouth Daily, Disp: 30 tablet, Rfl: 5    Turmeric 500 MG CAPS, Take by mouth, Disp: , Rfl:     ibuprofen (ADVIL;MOTRIN) 800 MG tablet, Take 1 tablet by mouth 2 times daily as needed for Pain, Disp: 60 tablet, Rfl: 1    FLUoxetine (PROZAC) 10 MG tablet, Take 1 tablet by mouth 2 times daily, Disp: 60 tablet, Rfl: 5    calcium citrate (CALCITRATE) 250 MG TABS tablet, Take 1 tablet by mouth 2 times daily (Patient taking differently: Take 250 mg by mouth daily ), Disp: 60 tablet, Rfl: 2    Cholecalciferol (VITAMIN D-3) 5000 UNITS TABS, Take by mouth daily Instructed to hold, Disp: , Rfl:     Allergies   Allergen Reactions    Latex     Prednisone        Social History     Tobacco Use    Smoking status: Never Smoker    Smokeless tobacco: Never Used   Substance Use Topics    Alcohol use: Yes     Comment: social    Drug use: No      Past Surgical History:   Procedure Laterality Date    COLONOSCOPY  1/2015    DILATION AND CURETTAGE OF UTERUS  2007, 2006    HYSTERECTOMY  2008   [address] [facility] [address], [address] [facility] [place], 1990    x 4    OTHER SURGICAL HISTORY  10/8/15    bilateral tmj arthrodesis/steroid injection    UPPER GASTROINTESTINAL ENDOSCOPY  12/17/2014    UPPER GASTROINTESTINAL ENDOSCOPY  04/12/2017    UPPER GASTROINTESTINAL ENDOSCOPY N/A 7/13/2018    EGD BIOPSY performed by Flori Lopez MD at [address] [place] [address] Left 1/16/2019    LEFT WRIST OPEN REDUCTION INTERNAL FIXATION performed by Citlaly Horan DO at [facility] WRIST FRACTURE SURGERY Left 7/19/2019    LEFT WRIST  REMOVAL OF HARDWARE performed by Citlaly Horan DO at [facility] OR     Family History   Problem Relation Age of Onset    High Blood Pressure Mother     High Cholesterol Mother     Diabetes Father     Obesity Father     Thyroid Disease Father     Stroke Father     High Cholesterol Father     High Blood Pressure Father     Heart Disease Father         CHF    Kidney Disease Father     Heart Attack Father     Thyroid Disease Sister     Celiac Disease Sister     Thyroid Disease Brother     Diabetes Brother     High Blood Pressure Brother     Heart Disease Maternal Grandmother     High Blood Pressure Maternal Grandmother     Diabetes Maternal Grandmother     Diabetes Maternal Grandfather     High Blood Pressure Maternal Grandfather     Thyroid Disease Paternal Grandmother     Heart Disease Paternal Grandfather     Diabetes Maternal Aunt     Diabetes Maternal Uncle      Past Medical History:   Diagnosis Date    Anemia     Anxiety     Celiac disease     Difficult intubation     Intubated successfully 1/2009 with Glidescope #3, 7.0 ETT.  Eating disorder 10 years ago    anorexia, Dr. Kelly Obrien monitoring.  History of blood transfusion 1993.  Hypoglycemia     Hypothyroidism     Latex allergy, contact dermatitis     PONV (postoperative nausea and vomiting)     TMJ (dislocation of temporomandibular joint)     Vitamin D deficiency     follows with Dr. Jaquez Falls:    07/24/20 1007   BP: 108/72   Pulse: 63   Temp: 97.7 °F (36.5 °C)   SpO2: 98%   Weight: 170 lb (77.1 kg)     BP Readings from Last 3 Encounters:   07/24/20 108/72   06/26/20 120/78   03/20/20 120/78        Physical Exam  Vitals signs and nursing note reviewed. Constitutional:       Appearance: She is well-developed. HENT:      Head: Normocephalic. Right Ear: External ear normal.      Left Ear: External ear normal.      Nose: Nose normal.   Eyes:      Conjunctiva/sclera: Conjunctivae normal.      Pupils: Pupils are equal, round, and reactive to light. Neck:      Musculoskeletal: Normal range of motion and neck supple. Cardiovascular:      Rate and Rhythm: Normal rate. Pulmonary:      Breath sounds: Normal breath sounds.    Abdominal: General: Bowel sounds are normal.      Palpations: Abdomen is soft. Musculoskeletal: Normal range of motion. Skin:     General: Skin is warm and dry. Neurological:      Mental Status: She is alert and oriented to person, place, and time. Psychiatric:         Behavior: Behavior normal.     Present vitals physical examination remained stable. Medications to continue as prescribed. Reassessment 2 weeks post MRI study and sooner if problems.     Lab Results   Component Value Date    TSH 0.976 03/14/2020    TSH 1.080 09/07/2019    T1KJTBT 7.9 03/14/2020    L9ZPJTF 8.2 08/25/2015    FT3 2.5 01/16/2013    T4FREE 1.17 09/07/2019    T4FREE 1.34 03/02/2019     Lab Results   Component Value Date    CHOL 184 03/14/2020    CHOL 186 03/05/2020     Lab Results   Component Value Date    TRIG 44 03/14/2020    TRIG 76 03/05/2020     Lab Results   Component Value Date    HDL 71 03/14/2020    HDL 74 03/05/2020     No results found for: [facility]  Lab Results   Component Value Date    LABVLDL 9 03/14/2020    LABVLDL 12 09/07/2019     No results found for: [facility]  Lab Results   Component Value Date    WBC 3.7 (L) 03/14/2020    HGB 12.6 03/14/2020    HCT 38.6 03/14/2020    MCV 97.7 03/14/2020     03/14/2020    LYMPHOPCT 24.9 03/14/2020    RBC 3.95 03/14/2020    MCH 31.9 03/14/2020    MCHC 32.6 03/14/2020    RDW 12.0 03/14/2020     Lab Results   Component Value Date     03/14/2020    K 4.6 03/14/2020     03/14/2020    CO2 27 03/14/2020    BUN 23 (H) 03/14/2020    CREATININE 0.9 03/14/2020    GLUCOSE 91 03/14/2020    CALCIUM 10.0 03/14/2020    PROT 7.2 03/14/2020    LABALBU 4.4 03/14/2020    BILITOT 0.4 03/14/2020    ALKPHOS 48 03/14/2020    AST 19 03/14/2020    ALT 15 03/14/2020    LABGLOM >60 03/14/2020    GFRAA >60 03/14/2020      No results found for: PSA, PSADIA   Lab Results   Component Value Date    LABA1C 5.0 03/23/2018    LABA1C 5.7 03/17/2014    LABA1C 5.6 01/10/2014     No results found for: EAG       Plan Per Assessment:  Silvestre Roberts was seen today for headache. Diagnoses and all orders for this visit:    Gastroesophageal reflux disease without esophagitis    Celiac disease    Hypothyroidism, unspecified type    Chronic nonintractable headache, unspecified headache type  -     MRI BRAIN W WO CONTRAST; Future    Balance disorder  -     MRI BRAIN W WO CONTRAST; Future    Other orders  -     esomeprazole (NEXIUM) 40 MG delayed release capsule; Take 1 capsule by mouth every morning (before breakfast)  -     levothyroxine (SYNTHROID) 75 MCG tablet; Take 1 tablet by mouth Daily            Return in about 2 weeks (around 8/7/2020). Odin Meyer DO    Note was generated with the assistance of voice recognition software. Document was reviewed however may contain grammatical errors.

## 2020-07-30 ENCOUNTER — HOSPITAL ENCOUNTER (OUTPATIENT)
Dept: MRI IMAGING | Age: 55
Discharge: HOME | End: 2020-08-01
Payer: COMMERCIAL

## 2020-07-30 PROCEDURE — 6360000004 HC RX CONTRAST MEDICATION: Performed by: RADIOLOGY

## 2020-07-30 PROCEDURE — 70553 MRI BRAIN STEM W/O & W/DYE: CPT

## 2020-07-30 PROCEDURE — A9579 GAD-BASE MR CONTRAST NOS,1ML: HCPCS | Performed by: RADIOLOGY

## 2020-07-30 RX ADMIN — GADOTERIDOL 15 ML: 279.3 INJECTION, SOLUTION INTRAVENOUS at 15:57

## 2020-08-04 ENCOUNTER — TELEPHONE (OUTPATIENT)
Dept: PRIMARY CARE CLINIC | Age: 55
End: 2020-08-04

## 2020-08-04 NOTE — TELEPHONE ENCOUNTER
All okay on MRI aside from evidence of possible sinus issues on the right side. If discomfort or drainage she should be seen this week or next.

## 2020-08-07 ENCOUNTER — OFFICE VISIT (OUTPATIENT)
Dept: PRIMARY CARE CLINIC | Age: 55
End: 2020-08-07
Payer: COMMERCIAL

## 2020-08-07 VITALS
HEART RATE: 78 BPM | TEMPERATURE: 98.6 F | DIASTOLIC BLOOD PRESSURE: 78 MMHG | OXYGEN SATURATION: 98 % | SYSTOLIC BLOOD PRESSURE: 120 MMHG

## 2020-08-07 PROBLEM — J32.0 CHRONIC MAXILLARY SINUSITIS: Status: ACTIVE | Noted: 2020-08-07

## 2020-08-07 PROCEDURE — 99214 OFFICE O/P EST MOD 30 MIN: CPT | Performed by: FAMILY MEDICINE

## 2020-08-07 RX ORDER — AMOXICILLIN AND CLAVULANATE POTASSIUM 875; 125 MG/1; MG/1
1 TABLET, FILM COATED ORAL 2 TIMES DAILY
Qty: 30 TABLET | Refills: 0 | Status: SHIPPED | OUTPATIENT
Start: 2020-08-07 | End: 2020-08-22

## 2020-08-07 RX ORDER — PREDNISONE 1 MG/1
TABLET ORAL
Qty: 30 TABLET | Refills: 0 | Status: SHIPPED
Start: 2020-08-07 | End: 2020-08-24 | Stop reason: ALTCHOICE

## 2020-08-07 ASSESSMENT — ENCOUNTER SYMPTOMS
SINUS PAIN: 1
SINUS PRESSURE: 1
RESPIRATORY NEGATIVE: 1
ALLERGIC/IMMUNOLOGIC NEGATIVE: 1
EYES NEGATIVE: 1
GASTROINTESTINAL NEGATIVE: 1

## 2020-08-07 NOTE — PROGRESS NOTES
20     Kishan Yoo    : 1965 Sex: female   Age: 63 y.o. Chief Complaint   Patient presents with    Headache     overall about the same recent mri       Prior to Admission medications    Medication Sig Start Date End Date Taking? Authorizing Provider   amoxicillin-clavulanate (AUGMENTIN) 875-125 MG per tablet Take 1 tablet by mouth 2 times daily for 15 days 20 Yes Kathleen Kern DO   predniSONE (DELTASONE) 5 MG tablet 4 tablets daily for 3 days then 3 tablets daily for 3 days then 2 tablets daily for 3 days then 1 tablet daily for 3 days 20  Yes Kathleen Kern DO   esomeprazole (NEXIUM) 40 MG delayed release capsule Take 1 capsule by mouth every morning (before breakfast) 20  Yes Jonel Torres DO   levothyroxine (SYNTHROID) 75 MCG tablet Take 1 tablet by mouth Daily 20  Yes Kathleen Kern DO   Turmeric 500 MG CAPS Take by mouth   Yes Historical Provider, MD   ibuprofen (ADVIL;MOTRIN) 800 MG tablet Take 1 tablet by mouth 2 times daily as needed for Pain 20  Yes Kathleen Kern DO   FLUoxetine (PROZAC) 10 MG tablet Take 1 tablet by mouth 2 times daily 3/20/20  Yes Kathleen Kern DO   calcium citrate (CALCITRATE) 250 MG TABS tablet Take 1 tablet by mouth 2 times daily  Patient taking differently: Take 250 mg by mouth daily  19  Yes Moriah Hicks PA-C   Cholecalciferol (VITAMIN D-3) 5000 UNITS TABS Take by mouth daily Instructed to hold   Yes Historical Provider, MD          HPI: Patient is seen today problems chronic headache hypothyroid reflux disease. Review of MRI study today. Confirms right maxillary sinusitis that has been chronic. Addition of Augmentin prednisone today and will reassess in a couple weeks. Clinically otherwise stable. Review of Systems   Constitutional: Negative. HENT: Positive for postnasal drip, sinus pressure and sinus pain. Eyes: Negative. Respiratory: Negative.     Gastrointestinal: Negative. Endocrine: Negative. Genitourinary: Negative. Musculoskeletal: Negative. Skin: Negative. Allergic/Immunologic: Negative. Neurological: Negative. Hematological: Negative. Psychiatric/Behavioral: Negative. Systems review is stable aside from sinus complaints as noted.       Current Outpatient Medications:     amoxicillin-clavulanate (AUGMENTIN) 875-125 MG per tablet, Take 1 tablet by mouth 2 times daily for 15 days, Disp: 30 tablet, Rfl: 0    predniSONE (DELTASONE) 5 MG tablet, 4 tablets daily for 3 days then 3 tablets daily for 3 days then 2 tablets daily for 3 days then 1 tablet daily for 3 days, Disp: 30 tablet, Rfl: 0    esomeprazole (NEXIUM) 40 MG delayed release capsule, Take 1 capsule by mouth every morning (before breakfast), Disp: 30 capsule, Rfl: 5    levothyroxine (SYNTHROID) 75 MCG tablet, Take 1 tablet by mouth Daily, Disp: 30 tablet, Rfl: 5    Turmeric 500 MG CAPS, Take by mouth, Disp: , Rfl:     ibuprofen (ADVIL;MOTRIN) 800 MG tablet, Take 1 tablet by mouth 2 times daily as needed for Pain, Disp: 60 tablet, Rfl: 1    FLUoxetine (PROZAC) 10 MG tablet, Take 1 tablet by mouth 2 times daily, Disp: 60 tablet, Rfl: 5    calcium citrate (CALCITRATE) 250 MG TABS tablet, Take 1 tablet by mouth 2 times daily (Patient taking differently: Take 250 mg by mouth daily ), Disp: 60 tablet, Rfl: 2    Cholecalciferol (VITAMIN D-3) 5000 UNITS TABS, Take by mouth daily Instructed to hold, Disp: , Rfl:     Allergies   Allergen Reactions    Latex     Prednisone        Social History     Tobacco Use    Smoking status: Never Smoker    Smokeless tobacco: Never Used   Substance Use Topics    Alcohol use: Yes     Comment: social    Drug use: No      Past Surgical History:   Procedure Laterality Date    COLONOSCOPY  1/2015    DILATION AND CURETTAGE OF UTERUS  2007, 2006    HYSTERE[address]OMY  [address]   [address], 1986, 1987, 1990    x 4    OTHER SURGICAL HISTORY 10/8/15    bilateral tmj arthrodesis/steroid injection    UPPER GASTROINTESTINAL ENDOSCOPY  12/17/2014    UPPER GASTROINTESTINAL ENDOSCOPY  04/12/2017    UPPER GASTROINTESTINAL ENDOSCOPY N/A 7/13/2018    EGD BIOPSY performed by Audelia Pacheco MD at [address] Left 1/16/2019    LEFT WRIST OPEN REDUCTION INTERNAL FIXATION performed by Matthew Szymanski DO at [facility] 7/19/2019    LEFT WRIST  REMOVAL OF HARDWARE performed by Matthew Szymanski DO at [facility] OR     Family History   Problem Relation Age of Onset    High Blood Pressure Mother     High Cholesterol Mother     Diabetes Father     Obesity Father     Thyroid Disease Father     Stroke Father     High Cholesterol Father     High Blood Pressure Father     Heart Disease Father         CHF    Kidney Disease Father     Heart Attack Father     Thyroid Disease Sister     Celiac Disease Sister     Thyroid Disease Brother     Diabetes Brother     High Blood Pressure Brother     Heart Disease Maternal Grandmother     High Blood Pressure Maternal Grandmother     Diabetes Maternal Grandmother     Diabetes Maternal Grandfather     High Blood Pressure Maternal Grandfather     Thyroid Disease Paternal Grandmother     Heart Disease Paternal Grandfather     Diabetes Maternal Aunt     Diabetes Maternal Uncle      Past Medical History:   Diagnosis Date    Anemia     Anxiety     Celiac disease     Difficult intubation     Intubated successfully 1/2009 with Glidescope #3, 7.0 ETT.  Eating disorder 10 years ago    anorexia, Dr. Jesus Ellis monitoring.  History of blood transfusion 1993.     Hypoglycemia     Hypothyroidism     Latex allergy, contact dermatitis     PONV (postoperative nausea and vomiting)     TMJ (dislocation of temporomandibular joint)     Vitamin D deficiency     follows with Dr. Alexandre Geller:    08/07/20 0940   BP: 120/78   Pulse: 78   Temp: 98.6 °F (37 °C)   SpO2: 98%     BP Readings from Last 3 Encounters:   08/07/20 120/78   07/24/20 108/72   06/26/20 120/78        Physical Exam  Vitals signs and nursing note reviewed. Constitutional:       Appearance: She is well-developed. HENT:      Head: Normocephalic. Right Ear: External ear normal.      Left Ear: External ear normal.      Nose: Nose normal.   Eyes:      Conjunctiva/sclera: Conjunctivae normal.      Pupils: Pupils are equal, round, and reactive to light. Neck:      Musculoskeletal: Normal range of motion and neck supple. Cardiovascular:      Rate and Rhythm: Normal rate. Pulmonary:      Breath sounds: Normal breath sounds. Abdominal:      General: Bowel sounds are normal.      Palpations: Abdomen is soft. Musculoskeletal: Normal range of motion. Skin:     General: Skin is warm and dry. Neurological:      Mental Status: She is alert and oriented to person, place, and time. Psychiatric:         Behavior: Behavior normal.     Exam findings are all stable. MRI reviewed this morning and all was stable aside from chronic sinus. Treatment as noted and will reassess in the next 2 weeks. Persistent or worsening symptoms will need ENT referral.          Plan Per Assessment:  Reji Meneses was seen today for headache. Diagnoses and all orders for this visit:    Chronic nonintractable headache, unspecified headache type    Hypothyroidism, unspecified type    Celiac disease    Gastroesophageal reflux disease without esophagitis    Chronic maxillary sinusitis    Other orders  -     amoxicillin-clavulanate (AUGMENTIN) 875-125 MG per tablet; Take 1 tablet by mouth 2 times daily for 15 days  -     predniSONE (DELTASONE) 5 MG tablet; 4 tablets daily for 3 days then 3 tablets daily for 3 days then 2 tablets daily for 3 days then 1 tablet daily for 3 days            Return in about 2 weeks (around 8/21/2020).       Nick Lewis DO    Note was generated with the assistance of voice recognition software. Document was reviewed however may contain grammatical errors.

## 2020-08-24 ENCOUNTER — OFFICE VISIT (OUTPATIENT)
Dept: PRIMARY CARE CLINIC | Age: 55
End: 2020-08-24
Payer: COMMERCIAL

## 2020-08-24 VITALS
TEMPERATURE: 97.9 F | SYSTOLIC BLOOD PRESSURE: 120 MMHG | BODY MASS INDEX: 29.18 KG/M2 | HEART RATE: 80 BPM | DIASTOLIC BLOOD PRESSURE: 70 MMHG | WEIGHT: 170 LBS | OXYGEN SATURATION: 97 %

## 2020-08-24 PROCEDURE — 99214 OFFICE O/P EST MOD 30 MIN: CPT | Performed by: FAMILY MEDICINE

## 2020-08-24 RX ORDER — AMOXICILLIN AND CLAVULANATE POTASSIUM 875; 125 MG/1; MG/1
1 TABLET, FILM COATED ORAL 2 TIMES DAILY
Qty: 20 TABLET | Refills: 0 | Status: SHIPPED | OUTPATIENT
Start: 2020-08-24 | End: 2020-09-03

## 2020-08-24 RX ORDER — PREDNISONE 1 MG/1
TABLET ORAL
Qty: 30 TABLET | Refills: 0 | Status: SHIPPED
Start: 2020-08-24 | End: 2020-09-25 | Stop reason: ALTCHOICE

## 2020-08-24 ASSESSMENT — ENCOUNTER SYMPTOMS
SINUS PAIN: 1
RESPIRATORY NEGATIVE: 1
SINUS PRESSURE: 1
GASTROINTESTINAL NEGATIVE: 1
ALLERGIC/IMMUNOLOGIC NEGATIVE: 1
EYES NEGATIVE: 1

## 2020-08-24 NOTE — PROGRESS NOTES
20     Razia Varela    : 1965 Sex: female   Age: 63 y.o. Chief Complaint   Patient presents with    Headache     recheck. Started to improve. Balance is improving. Finished out steroids and abx       Prior to Admission medications    Medication Sig Start Date End Date Taking? Authorizing Provider   amoxicillin-clavulanate (AUGMENTIN) 875-125 MG per tablet Take 1 tablet by mouth 2 times daily for 10 days 8/24/20 9/3/20 Yes Umair Kern DO   predniSONE (DELTASONE) 5 MG tablet 4 tablets daily for 3 days then 3 tablets daily for 3 days then 2 tablets daily for 3 days then 1 tablet daily for 3 days 20  Yes Umair Kern DO   esomeprazole (NEXIUM) 40 MG delayed release capsule Take 1 capsule by mouth every morning (before breakfast) 20  Yes Tori Boothe, DO   levothyroxine (SYNTHROID) 75 MCG tablet Take 1 tablet by mouth Daily 20  Yes Umair Kern DO   Turmeric 500 MG CAPS Take by mouth   Yes Historical Provider, MD   ibuprofen (ADVIL;MOTRIN) 800 MG tablet Take 1 tablet by mouth 2 times daily as needed for Pain 20  Yes Umair Kern DO   FLUoxetine (PROZAC) 10 MG tablet Take 1 tablet by mouth 2 times daily 3/20/20  Yes Umair Kern DO   calcium citrate (CALCITRATE) 250 MG TABS tablet Take 1 tablet by mouth 2 times daily  Patient taking differently: Take 250 mg by mouth daily  19  Yes Guero Lance PA-C   Cholecalciferol (VITAMIN D-3) 5000 UNITS TABS Take by mouth daily Instructed to hold   Yes Historical Provider, MD          HPI: Vicente See is seen today chronic sinus inflammation celiac disease hypothyroidism balance disorder hyperlipidemia anxiety depression. Overall medically has been managing fairly well. Much better with the Augmentin and prednisone. Refill was provided today to be taken if recurrent symptoms but otherwise will recheck in 1 month and she will document the need for medication. Reflux is been controlled.   Hypothyroid stable. Review of Systems   Constitutional: Negative. HENT: Positive for postnasal drip, sinus pressure and sinus pain. Eyes: Negative. Respiratory: Negative. Gastrointestinal: Negative. Endocrine: Negative. Genitourinary: Negative. Musculoskeletal: Negative. Skin: Negative. Allergic/Immunologic: Negative. Neurological: Negative. Hematological: Negative. Psychiatric/Behavioral: Negative.                Current Outpatient Medications:     amoxicillin-clavulanate (AUGMENTIN) 875-125 MG per tablet, Take 1 tablet by mouth 2 times daily for 10 days, Disp: 20 tablet, Rfl: 0    predniSONE (DELTASONE) 5 MG tablet, 4 tablets daily for 3 days then 3 tablets daily for 3 days then 2 tablets daily for 3 days then 1 tablet daily for 3 days, Disp: 30 tablet, Rfl: 0    esomeprazole (NEXIUM) 40 MG delayed release capsule, Take 1 capsule by mouth every morning (before breakfast), Disp: 30 capsule, Rfl: 5    levothyroxine (SYNTHROID) 75 MCG tablet, Take 1 tablet by mouth Daily, Disp: 30 tablet, Rfl: 5    Turmeric 500 MG CAPS, Take by mouth, Disp: , Rfl:     ibuprofen (ADVIL;MOTRIN) 800 MG tablet, Take 1 tablet by mouth 2 times daily as needed for Pain, Disp: 60 tablet, Rfl: 1    FLUoxetine (PROZAC) 10 MG tablet, Take 1 tablet by mouth 2 times daily, Disp: 60 tablet, Rfl: 5    calcium citrate (CALCITRATE) 250 MG TABS tablet, Take 1 tablet by mouth 2 times daily (Patient taking differently: Take 250 mg by mouth daily ), Disp: 60 tablet, Rfl: 2    Cholecalciferol (VITAMIN D-3) 5000 UNITS TABS, Take by mouth daily Instructed to hold, Disp: , Rfl:     Allergies   Allergen Reactions    Latex     Prednisone        Social History     Tobacco Use    Smoking status: Never Smoker    Smokeless tobacco: Never Used   Substance Use Topics    Alcohol use: Yes     Comment: social    Drug use: No      Past Surgical History:   Procedure Laterality Date    COLONOSCOPY  1/2015    DILATION AND CURETTAGE OF UTERUS  2007, 2006    HYSTERECTOMY  2008   [facility]  1997, 1986, [address], [place]    x 4    OTHER SURGICAL HISTORY  10/8/15    bilateral tmj arthrodesis/steroid injection    UPPER GASTROINTESTINAL ENDOSCOPY  12/17/2014    UPPER GASTROINTESTINAL ENDOSCOPY  04/12/2017    UPPER GASTROINTESTINAL ENDOSCOPY N/A 7/13/2018    EGD BIOPSY performed by Flori Lopez MD at [facility] Left 1/16/2019    LEFT WRIST OPEN REDUCTION INTERNAL FIXATION performed by Citlaly Horan DO at [facility] Left 7/19/2019    LEFT WRIST  REMOVAL OF HARDWARE performed by Citlaly Horan DO at [facility] OR     Family History   Problem Relation Age of Onset    High Blood Pressure Mother     High Cholesterol Mother     Diabetes Father     Obesity Father     Thyroid Disease Father     Stroke Father     High Cholesterol Father     High Blood Pressure Father     Heart Disease Father         CHF    Kidney Disease Father     Heart Attack Father     Thyroid Disease Sister     Celiac Disease Sister     Thyroid Disease Brother     Diabetes Brother     High Blood Pressure Brother     Heart Disease Maternal Grandmother     High Blood Pressure Maternal Grandmother     Diabetes Maternal Grandmother     Diabetes Maternal Grandfather     High Blood Pressure Maternal Grandfather     Thyroid Disease Paternal Grandmother     Heart Disease Paternal Grandfather     Diabetes Maternal Aunt     Diabetes Maternal Uncle      Past Medical History:   Diagnosis Date    Anemia     Anxiety     Celiac disease     Difficult intubation     Intubated successfully 1/2009 with Glidescope #3, 7.0 ETT.  Eating disorder 10 years ago    anorexia, Dr. Darlys Gilford monitoring.  History of blood transfusion 1993.     Hypoglycemia     Hypothyroidism     Latex allergy, contact dermatitis     PONV (postoperative nausea and vomiting)     TMJ (dislocation of temporomandibular joint)     Vitamin D deficiency     follows with Dr. Briana Patterson:    08/24/20 0931   BP: 120/70   Pulse: 80   Temp: 97.9 °F (36.6 °C)   SpO2: 97%   Weight: 170 lb (77.1 kg)     BP Readings from Last 3 Encounters:   08/24/20 120/70   08/07/20 120/78   07/24/20 108/72        Physical Exam  Vitals signs and nursing note reviewed. Constitutional:       Appearance: She is well-developed. HENT:      Head: Normocephalic. Right Ear: External ear normal.      Left Ear: External ear normal.      Nose: Nose normal.   Eyes:      Conjunctiva/sclera: Conjunctivae normal.      Pupils: Pupils are equal, round, and reactive to light. Neck:      Musculoskeletal: Normal range of motion and neck supple. Cardiovascular:      Rate and Rhythm: Normal rate. Pulmonary:      Breath sounds: Normal breath sounds. Abdominal:      General: Bowel sounds are normal.      Palpations: Abdomen is soft. Musculoskeletal: Normal range of motion. Skin:     General: Skin is warm and dry. Neurological:      Mental Status: She is alert and oriented to person, place, and time. Psychiatric:         Behavior: Behavior normal.     Today's vitals physical examination stable. Adjustments with medications as noted. Reassessment with me in the next month and sooner if problems. Plan Per Assessment:  Reyes Escamilla was seen today for headache. Diagnoses and all orders for this visit:    Chronic maxillary sinusitis    Celiac disease    Hypothyroidism, unspecified type    Balance disorder    Hyperlipidemia, unspecified hyperlipidemia type    Other depression    Other orders  -     amoxicillin-clavulanate (AUGMENTIN) 875-125 MG per tablet; Take 1 tablet by mouth 2 times daily for 10 days  -     predniSONE (DELTASONE) 5 MG tablet; 4 tablets daily for 3 days then 3 tablets daily for 3 days then 2 tablets daily for 3 days then 1 tablet daily for 3 days            Return in about 4 weeks (around 9/21/2020).       Nancy Glynn,

## 2020-09-25 ENCOUNTER — OFFICE VISIT (OUTPATIENT)
Dept: PRIMARY CARE CLINIC | Age: 55
End: 2020-09-25
Payer: COMMERCIAL

## 2020-09-25 VITALS
BODY MASS INDEX: 28.84 KG/M2 | SYSTOLIC BLOOD PRESSURE: 112 MMHG | DIASTOLIC BLOOD PRESSURE: 64 MMHG | HEART RATE: 76 BPM | WEIGHT: 168 LBS | OXYGEN SATURATION: 98 % | RESPIRATION RATE: 16 BRPM | TEMPERATURE: 97.5 F

## 2020-09-25 PROBLEM — M67.441 GANGLION CYST OF FINGER OF RIGHT HAND: Status: ACTIVE | Noted: 2020-09-25

## 2020-09-25 PROCEDURE — 99214 OFFICE O/P EST MOD 30 MIN: CPT | Performed by: FAMILY MEDICINE

## 2020-09-25 ASSESSMENT — ENCOUNTER SYMPTOMS
RESPIRATORY NEGATIVE: 1
EYES NEGATIVE: 1
ALLERGIC/IMMUNOLOGIC NEGATIVE: 1
GASTROINTESTINAL NEGATIVE: 1

## 2020-09-25 NOTE — PROGRESS NOTES
Negative. Systems review presently stable sinuses improved with taking a second round of antibiotics.         Current Outpatient Medications:     esomeprazole (NEXIUM) 40 MG delayed release capsule, Take 1 capsule by mouth every morning (before breakfast), Disp: 30 capsule, Rfl: 5    levothyroxine (SYNTHROID) 75 MCG tablet, Take 1 tablet by mouth Daily, Disp: 30 tablet, Rfl: 5    Turmeric 500 MG CAPS, Take by mouth, Disp: , Rfl:     ibuprofen (ADVIL;MOTRIN) 800 MG tablet, Take 1 tablet by mouth 2 times daily as needed for Pain, Disp: 60 tablet, Rfl: 1    FLUoxetine (PROZAC) 10 MG tablet, Take 1 tablet by mouth 2 times daily (Patient taking differently: Take 10 mg by mouth daily ), Disp: 60 tablet, Rfl: 5    Cholecalciferol (VITAMIN D-3) 5000 UNITS TABS, Take by mouth daily Instructed to hold, Disp: , Rfl:     calcium citrate (CALCITRATE) 250 MG TABS tablet, Take 1 tablet by mouth 2 times daily (Patient not taking: Reported on 9/25/2020), Disp: 60 tablet, Rfl: 2    Allergies   Allergen Reactions    Latex     Prednisone        Social History     Tobacco Use    Smoking status: Never Smoker    Smokeless tobacco: Never Used   Substance Use Topics    Alcohol use: Yes     Comment: social    Drug use: No      Past Surgical History:   Procedure Laterality Date    COLONOSCOPY  1/2015    DILATION AND CURETTAGE OF UTERUS  2007, 2006    HYSTERECTOMY  2008   [address] [facility] [facility], [facility]    x 4    OTHER SURGICAL HISTORY  10/8/15    bilateral tmj arthrodesis/steroid injection    UPPER GASTROINTESTINAL ENDOSCOPY  12/17/2014    UPPER GASTROINTESTINAL ENDOSCOPY  04/12/2017    UPPER GASTROINTESTINAL ENDOSCOPY N/A 7/13/2018    EGD BIOPSY performed by Priscila Hoffman MD at [address] [facility], [place] Left 1/16/2019    LEFT WRIST OPEN REDUCTION INTERNAL FIXATION performed by Albert Aguilar DO at [facility] 22 WRIST FRACTURE SURGERY Left 7/19/2019    LEFT WRIST  REMOVAL OF HARDWARE performed by Pawan Teague DO at [facility] OR     Family History   Problem Relation Age of Onset    High Blood Pressure Mother     High Cholesterol Mother     Diabetes Father     Obesity Father     Thyroid Disease Father     Stroke Father     High Cholesterol Father     High Blood Pressure Father     Heart Disease Father         CHF    Kidney Disease Father     Heart Attack Father     Thyroid Disease Sister     Celiac Disease Sister     Thyroid Disease Brother     Diabetes Brother     High Blood Pressure Brother     Heart Disease Maternal Grandmother     High Blood Pressure Maternal Grandmother     Diabetes Maternal Grandmother     Diabetes Maternal Grandfather     High Blood Pressure Maternal Grandfather     Thyroid Disease Paternal Grandmother     Heart Disease Paternal Grandfather     Diabetes Maternal Aunt     Diabetes Maternal Uncle      Past Medical History:   Diagnosis Date    Anemia     Anxiety     Celiac disease     Difficult intubation     Intubated successfully 1/2009 with Glidescope #3, 7.0 ETT.  Eating disorder 10 years ago    anorexia, Dr. Moses Stains monitoring.  History of blood transfusion 1993.  Hypoglycemia     Hypothyroidism     Latex allergy, contact dermatitis     PONV (postoperative nausea and vomiting)     TMJ (dislocation of temporomandibular joint)     Vitamin D deficiency     follows with Dr. Cuellar Leavens:    09/25/20 1032   BP: 112/64   Pulse: 76   Resp: 16   Temp: 97.5 °F (36.4 °C)   TempSrc: Temporal   SpO2: 98%   Weight: 168 lb (76.2 kg)     BP Readings from Last 3 Encounters:   09/25/20 112/64   08/24/20 120/70   08/07/20 120/78        Physical Exam  Vitals signs and nursing note reviewed. Constitutional:       Appearance: She is well-developed. HENT:      Head: Normocephalic.       Right Ear: External ear normal.      Left Ear: External ear normal.      Nose: Nose normal.   Eyes:      Conjunctiva/sclera: Conjunctivae normal. Pupils: Pupils are equal, round, and reactive to light. Neck:      Musculoskeletal: Normal range of motion and neck supple. Cardiovascular:      Rate and Rhythm: Normal rate. Pulmonary:      Breath sounds: Normal breath sounds. Abdominal:      General: Bowel sounds are normal.      Palpations: Abdomen is soft. Musculoskeletal: Normal range of motion. Skin:     General: Skin is warm and dry. Neurological:      Mental Status: She is alert and oriented to person, place, and time. Psychiatric:         Behavior: Behavior normal.     Today's vitals physical examination stable. We will maintain present meds care. Reassessment 1 month and sooner if problems. Plan Per Assessment:  Silvestre Roberts was seen today for hypothyroidism, hyperlipidemia and depression. Diagnoses and all orders for this visit:    Chronic maxillary sinusitis  -     Amb External Referral To ENT    Gastroesophageal reflux disease without esophagitis  -     CBC Auto Differential; Future  -     Comprehensive Metabolic Panel; Future  -     Lipid Panel; Future  -     T4; Future  -     TSH without Reflex; Future    Hypothyroidism, unspecified type  -     CBC Auto Differential; Future  -     Comprehensive Metabolic Panel; Future  -     Lipid Panel; Future  -     T4; Future  -     TSH without Reflex; Future    Ganglion cyst of finger of right hand            Return in about 1 month (around 10/25/2020). Odin Meyer DO    Note was generated with the assistance of voice recognition software. Document was reviewed however may contain grammatical errors.

## 2020-10-19 VITALS — BODY MASS INDEX: 28.32 KG/M2 | WEIGHT: 165 LBS

## 2020-10-19 RX ORDER — ESOMEPRAZOLE MAGNESIUM 40 MG/1
CAPSULE, DELAYED RELEASE ORAL
Qty: 30 CAPSULE | Refills: 5 | Status: SHIPPED
Start: 2020-10-19 | End: 2021-11-19

## 2020-10-23 ENCOUNTER — HOSPITAL ENCOUNTER (OUTPATIENT)
Dept: GENERAL RADIOLOGY | Age: 55
Discharge: HOME | End: 2020-10-25
Payer: COMMERCIAL

## 2020-10-23 PROCEDURE — 77063 BREAST TOMOSYNTHESIS BI: CPT

## 2020-10-30 ENCOUNTER — HOSPITAL ENCOUNTER (OUTPATIENT)
Age: 55
Discharge: HOME | End: 2020-10-30
Payer: COMMERCIAL

## 2020-10-30 LAB
ALBUMIN SERPL-MCNC: 4.4 G/DL (ref 3.5–5.2)
ALP BLD-CCNC: 60 U/L (ref 35–104)
ALT SERPL-CCNC: 24 U/L (ref 0–32)
ANION GAP SERPL CALCULATED.3IONS-SCNC: 10 MMOL/L (ref 7–16)
AST SERPL-CCNC: 26 U/L (ref 0–31)
BASOPHILS ABSOLUTE: 0.03 E9/L (ref 0–0.2)
BASOPHILS RELATIVE PERCENT: 0.8 % (ref 0–2)
BILIRUB SERPL-MCNC: 0.4 MG/DL (ref 0–1.2)
BUN BLDV-MCNC: 14 MG/DL (ref 6–20)
CALCIUM SERPL-MCNC: 10.2 MG/DL (ref 8.6–10.2)
CHLORIDE BLD-SCNC: 104 MMOL/L (ref 98–107)
CHOLESTEROL, TOTAL: 200 MG/DL (ref 0–199)
CO2: 26 MMOL/L (ref 22–29)
CREAT SERPL-MCNC: 0.9 MG/DL (ref 0.5–1)
EOSINOPHILS ABSOLUTE: 0.14 E9/L (ref 0.05–0.5)
EOSINOPHILS RELATIVE PERCENT: 3.9 % (ref 0–6)
GFR AFRICAN AMERICAN: >60
GFR NON-AFRICAN AMERICAN: >60 ML/MIN/1.73
GLUCOSE BLD-MCNC: 95 MG/DL (ref 74–99)
HCT VFR BLD CALC: 37.5 % (ref 34–48)
HDLC SERPL-MCNC: 68 MG/DL
HEMOGLOBIN: 12.6 G/DL (ref 11.5–15.5)
IMMATURE GRANULOCYTES #: 0.02 E9/L
IMMATURE GRANULOCYTES %: 0.6 % (ref 0–5)
LDL CHOLESTEROL CALCULATED: 118 MG/DL (ref 0–99)
LYMPHOCYTES ABSOLUTE: 0.83 E9/L (ref 1.5–4)
LYMPHOCYTES RELATIVE PERCENT: 23.2 % (ref 20–42)
MCH RBC QN AUTO: 33.1 PG (ref 26–35)
MCHC RBC AUTO-ENTMCNC: 33.6 % (ref 32–34.5)
MCV RBC AUTO: 98.4 FL (ref 80–99.9)
MONOCYTES ABSOLUTE: 0.25 E9/L (ref 0.1–0.95)
MONOCYTES RELATIVE PERCENT: 7 % (ref 2–12)
NEUTROPHILS ABSOLUTE: 2.31 E9/L (ref 1.8–7.3)
NEUTROPHILS RELATIVE PERCENT: 64.5 % (ref 43–80)
PDW BLD-RTO: 12.2 FL (ref 11.5–15)
PLATELET # BLD: 223 E9/L (ref 130–450)
PMV BLD AUTO: 9 FL (ref 7–12)
POTASSIUM SERPL-SCNC: 4.9 MMOL/L (ref 3.5–5)
RBC # BLD: 3.81 E12/L (ref 3.5–5.5)
SODIUM BLD-SCNC: 140 MMOL/L (ref 132–146)
T4 TOTAL: 9 MCG/DL (ref 4.5–11.7)
TOTAL PROTEIN: 7.5 G/DL (ref 6.4–8.3)
TRIGL SERPL-MCNC: 69 MG/DL (ref 0–149)
TSH SERPL DL<=0.05 MIU/L-ACNC: 0.71 UIU/ML (ref 0.27–4.2)
VLDLC SERPL CALC-MCNC: 14 MG/DL
WBC # BLD: 3.6 E9/L (ref 4.5–11.5)

## 2020-10-30 PROCEDURE — 36415 COLL VENOUS BLD VENIPUNCTURE: CPT

## 2020-10-30 PROCEDURE — 80061 LIPID PANEL: CPT

## 2020-10-30 PROCEDURE — 85025 COMPLETE CBC W/AUTO DIFF WBC: CPT

## 2020-10-30 PROCEDURE — 84436 ASSAY OF TOTAL THYROXINE: CPT

## 2020-10-30 PROCEDURE — 80053 COMPREHEN METABOLIC PANEL: CPT

## 2020-10-30 PROCEDURE — 84443 ASSAY THYROID STIM HORMONE: CPT

## 2021-02-05 ENCOUNTER — OFFICE VISIT (OUTPATIENT)
Dept: PRIMARY CARE CLINIC | Age: 56
End: 2021-02-05
Payer: COMMERCIAL

## 2021-02-05 VITALS
DIASTOLIC BLOOD PRESSURE: 72 MMHG | TEMPERATURE: 96.8 F | BODY MASS INDEX: 28.67 KG/M2 | SYSTOLIC BLOOD PRESSURE: 120 MMHG | HEART RATE: 72 BPM | WEIGHT: 167 LBS | OXYGEN SATURATION: 98 %

## 2021-02-05 DIAGNOSIS — F41.9 ANXIETY: ICD-10-CM

## 2021-02-05 DIAGNOSIS — R73.01 IMPAIRED FASTING GLUCOSE: ICD-10-CM

## 2021-02-05 DIAGNOSIS — J32.0 CHRONIC MAXILLARY SINUSITIS: Primary | ICD-10-CM

## 2021-02-05 DIAGNOSIS — E03.9 HYPOTHYROIDISM, UNSPECIFIED TYPE: ICD-10-CM

## 2021-02-05 PROCEDURE — 99214 OFFICE O/P EST MOD 30 MIN: CPT | Performed by: FAMILY MEDICINE

## 2021-02-05 RX ORDER — FLUTICASONE PROPIONATE 50 MCG
SPRAY, SUSPENSION (ML) NASAL
COMMUNITY
Start: 2020-12-18 | End: 2022-06-17

## 2021-02-05 RX ORDER — IBUPROFEN 800 MG/1
800 TABLET ORAL 2 TIMES DAILY PRN
Qty: 60 TABLET | Refills: 1 | Status: SHIPPED
Start: 2021-02-05 | End: 2022-06-17

## 2021-02-05 RX ORDER — ESTRADIOL 0.1 MG/G
CREAM VAGINAL PRN
COMMUNITY
Start: 2020-12-15

## 2021-02-05 ASSESSMENT — ENCOUNTER SYMPTOMS
ALLERGIC/IMMUNOLOGIC NEGATIVE: 1
GASTROINTESTINAL NEGATIVE: 1
RESPIRATORY NEGATIVE: 1
SINUS PAIN: 1
SINUS PRESSURE: 1
EYES NEGATIVE: 1

## 2021-02-05 NOTE — PROGRESS NOTES
21     Jaleel Alanis    : 1965 Sex: female   Age: 63 y.o. Chief Complaint   Patient presents with   [address]     from the fall    Headache     has been seeing ENT with abx rounds but going for CT scan now       Prior to Admission medications    Medication Sig Start Date End Date Taking? Authorizing Provider   fluticasone (FLONASE) 50 MCG/ACT nasal spray USE 2 SPRAYS IN EACH NOSTRIL EVERY DAY 20  Yes Historical Provider, MD   estradiol (ESTRACE) 0.1 MG/GM vaginal cream  12/15/20  Yes Historical Provider, MD   ibuprofen (ADVIL;MOTRIN) 800 MG tablet Take 1 tablet by mouth 2 times daily as needed for Pain 21  Yes Graham Salt Traikoff, DO   esomeprazole (NEXIUM) 40 MG delayed release capsule TAKE ONE CAPSULE BY MOUTH EVERY MORNING BEFORE BREAKFAST 10/19/20  Yes Graham Salt Traikoff, DO   levothyroxine (SYNTHROID) 75 MCG tablet Take 1 tablet by mouth Daily 20  Yes Graham Salt Traikoff, DO   Turmeric 500 MG CAPS Take by mouth   Yes Historical Provider, MD   FLUoxetine (PROZAC) 10 MG tablet Take 1 tablet by mouth 2 times daily  Patient taking differently: Take 10 mg by mouth daily  3/20/20  Yes Graham Salt Traikoff, DO   calcium citrate (CALCITRATE) 250 MG TABS tablet Take 1 tablet by mouth 2 times daily 19  Yes Selena Iverson PA-C   Cholecalciferol (VITAMIN D-3) 5000 UNITS TABS Take by mouth daily Instructed to hold   Yes Historical Provider, MD          HPI: Evaluated today chronic sinus hypothyroid impaired fasting glucose anxiety. She has been doing fairly well current medications following with ear nose and throat and will have a CT scan of the sinuses next week. And then proper follow-up and then I will see her in the next month. Medically we reviewed all medications and continue as prescribed. Thyroid is been good control. Lab studies to be completed prior to next visit. Review of Systems   Constitutional: Negative.     HENT: Positive for congestion, sinus pressure and sinus pain. Eyes: Negative. Respiratory: Negative. Gastrointestinal: Negative. Endocrine: Negative. Genitourinary: Negative. Musculoskeletal: Negative. Skin: Negative. Allergic/Immunologic: Negative. Neurological: Positive for headaches. Hematological: Negative. Psychiatric/Behavioral: Negative. Chronic sinus pain as noted.       Current Outpatient Medications:     fluticasone (FLONASE) 50 MCG/ACT nasal spray, USE 2 SPRAYS IN EACH NOSTRIL EVERY DAY, Disp: , Rfl:     estradiol (ESTRACE) 0.1 MG/GM vaginal cream, , Disp: , Rfl:     ibuprofen (ADVIL;MOTRIN) 800 MG tablet, Take 1 tablet by mouth 2 times daily as needed for Pain, Disp: 60 tablet, Rfl: 1    esomeprazole (NEXIUM) 40 MG delayed release capsule, TAKE ONE CAPSULE BY MOUTH EVERY MORNING BEFORE BREAKFAST, Disp: 30 capsule, Rfl: 5    levothyroxine (SYNTHROID) 75 MCG tablet, Take 1 tablet by mouth Daily, Disp: 30 tablet, Rfl: 5    Turmeric 500 MG CAPS, Take by mouth, Disp: , Rfl:     FLUoxetine (PROZAC) 10 MG tablet, Take 1 tablet by mouth 2 times daily (Patient taking differently: Take 10 mg by mouth daily ), Disp: 60 tablet, Rfl: 5    calcium citrate (CALCITRATE) 250 MG TABS tablet, Take 1 tablet by mouth 2 times daily, Disp: 60 tablet, Rfl: 2    Cholecalciferol (VITAMIN D-3) 5000 UNITS TABS, Take by mouth daily Instructed to hold, Disp: , Rfl:     Allergies   Allergen Reactions    Latex     Prednisone        Social History     Tobacco Use    Smoking status: Never Smoker    Smokeless tobacco: Never Used   Substance Use Topics    Alcohol use: Yes     Comment: social    Drug use: No      Past Surgical History:   Procedure Laterality Date    COLONOSCOPY  1/2015    DILATION AND CURETTAGE OF UTERUS  2007, 2006    HYSTERECTOMY  2008   [address] [facility], [address] [facility], 1990    x 4    OTHER SURGICAL HISTORY  10/8/15    bilateral tmj arthrodesis/steroid injection    UPPER GASTROINTESTINAL ENDOSCOPY 12.2 10/30/2020     Lab Results   Component Value Date     10/30/2020    K 4.9 10/30/2020     10/30/2020    CO2 26 10/30/2020    BUN 14 10/30/2020    CREATININE 0.9 10/30/2020    GLUCOSE 95 10/30/2020    CALCIUM 10.2 10/30/2020    PROT 7.5 10/30/2020    LABALBU 4.4 10/30/2020    BILITOT 0.4 10/30/2020    ALKPHOS 60 10/30/2020    AST 26 10/30/2020    ALT 24 10/30/2020    LABGLOM >60 10/30/2020    GFRAA >60 10/30/2020      No results found for: PSA, PSADIA   Lab Results   Component Value Date    LABA1C 5.0 03/23/2018    LABA1C 5.7 03/17/2014    LABA1C 5.6 01/10/2014     No results found for: EAG     Plan Per Assessment:  Estelle Hernandez was seen today for discuss labs and headache. Diagnoses and all orders for this visit:    Chronic maxillary sinusitis    Hypothyroidism, unspecified type  -     CBC Auto Differential; Future  -     Comprehensive Metabolic Panel; Future  -     Lipid Panel; Future  -     T4; Future  -     TSH without Reflex; Future    Impaired fasting glucose  -     CBC Auto Differential; Future  -     Comprehensive Metabolic Panel; Future  -     Lipid Panel; Future  -     T4; Future  -     TSH without Reflex; Future    Anxiety  -     CBC Auto Differential; Future  -     Comprehensive Metabolic Panel; Future  -     Lipid Panel; Future  -     T4; Future  -     TSH without Reflex; Future    Other orders  -     ibuprofen (ADVIL;MOTRIN) 800 MG tablet; Take 1 tablet by mouth 2 times daily as needed for Pain            Return in about 1 month (around 3/5/2021). Roverto Be,     Note was generated with the assistance of voice recognition software. Document was reviewed however may contain grammatical errors.

## 2021-02-06 ENCOUNTER — HOSPITAL ENCOUNTER (OUTPATIENT)
Dept: CT IMAGING | Age: 56
Discharge: HOME | End: 2021-02-08
Payer: COMMERCIAL

## 2021-02-06 DIAGNOSIS — J32.4 CHRONIC PANSINUSITIS: ICD-10-CM

## 2021-02-06 PROCEDURE — 70486 CT MAXILLOFACIAL W/O DYE: CPT

## 2021-02-11 RX ORDER — LEVOTHYROXINE SODIUM 0.07 MG/1
TABLET ORAL
Qty: 30 TABLET | Refills: 5 | Status: SHIPPED
Start: 2021-02-11 | End: 2021-08-10

## 2021-02-27 ENCOUNTER — HOSPITAL ENCOUNTER (OUTPATIENT)
Age: 56
Discharge: HOME | End: 2021-02-27
Payer: COMMERCIAL

## 2021-02-27 DIAGNOSIS — R73.01 IMPAIRED FASTING GLUCOSE: ICD-10-CM

## 2021-02-27 DIAGNOSIS — F41.9 ANXIETY: ICD-10-CM

## 2021-02-27 DIAGNOSIS — E03.9 HYPOTHYROIDISM, UNSPECIFIED TYPE: ICD-10-CM

## 2021-02-27 LAB
ALBUMIN SERPL-MCNC: 4.2 G/DL (ref 3.5–5.2)
ALP BLD-CCNC: 46 U/L (ref 35–104)
ALT SERPL-CCNC: 19 U/L (ref 0–32)
ANION GAP SERPL CALCULATED.3IONS-SCNC: 9 MMOL/L (ref 7–16)
AST SERPL-CCNC: 24 U/L (ref 0–31)
BASOPHILS ABSOLUTE: 0.05 E9/L (ref 0–0.2)
BASOPHILS RELATIVE PERCENT: 1.3 % (ref 0–2)
BILIRUB SERPL-MCNC: 0.5 MG/DL (ref 0–1.2)
BUN BLDV-MCNC: 12 MG/DL (ref 6–20)
CALCIUM SERPL-MCNC: 9.4 MG/DL (ref 8.6–10.2)
CHLORIDE BLD-SCNC: 104 MMOL/L (ref 98–107)
CHOLESTEROL, TOTAL: 196 MG/DL (ref 0–199)
CO2: 28 MMOL/L (ref 22–29)
CREAT SERPL-MCNC: 0.9 MG/DL (ref 0.5–1)
EOSINOPHILS ABSOLUTE: 0.31 E9/L (ref 0.05–0.5)
EOSINOPHILS RELATIVE PERCENT: 7.8 % (ref 0–6)
GFR AFRICAN AMERICAN: >60
GFR NON-AFRICAN AMERICAN: >60 ML/MIN/1.73
GLUCOSE BLD-MCNC: 89 MG/DL (ref 74–99)
HCT VFR BLD CALC: 36 % (ref 34–48)
HDLC SERPL-MCNC: 64 MG/DL
HEMOGLOBIN: 12.2 G/DL (ref 11.5–15.5)
IMMATURE GRANULOCYTES #: 0.01 E9/L
IMMATURE GRANULOCYTES %: 0.3 % (ref 0–5)
LDL CHOLESTEROL CALCULATED: 119 MG/DL (ref 0–99)
LYMPHOCYTES ABSOLUTE: 1.32 E9/L (ref 1.5–4)
LYMPHOCYTES RELATIVE PERCENT: 33.1 % (ref 20–42)
MCH RBC QN AUTO: 32.5 PG (ref 26–35)
MCHC RBC AUTO-ENTMCNC: 33.9 % (ref 32–34.5)
MCV RBC AUTO: 96 FL (ref 80–99.9)
MONOCYTES ABSOLUTE: 0.31 E9/L (ref 0.1–0.95)
MONOCYTES RELATIVE PERCENT: 7.8 % (ref 2–12)
NEUTROPHILS ABSOLUTE: 1.99 E9/L (ref 1.8–7.3)
NEUTROPHILS RELATIVE PERCENT: 49.7 % (ref 43–80)
PDW BLD-RTO: 12 FL (ref 11.5–15)
PLATELET # BLD: 186 E9/L (ref 130–450)
PMV BLD AUTO: 8.9 FL (ref 7–12)
POTASSIUM SERPL-SCNC: 4.6 MMOL/L (ref 3.5–5)
RBC # BLD: 3.75 E12/L (ref 3.5–5.5)
SODIUM BLD-SCNC: 141 MMOL/L (ref 132–146)
T4 TOTAL: 7.4 MCG/DL (ref 4.5–11.7)
TOTAL PROTEIN: 7 G/DL (ref 6.4–8.3)
TRIGL SERPL-MCNC: 63 MG/DL (ref 0–149)
TSH SERPL DL<=0.05 MIU/L-ACNC: 0.96 UIU/ML (ref 0.27–4.2)
VLDLC SERPL CALC-MCNC: 13 MG/DL
WBC # BLD: 4 E9/L (ref 4.5–11.5)

## 2021-02-27 PROCEDURE — 36415 COLL VENOUS BLD VENIPUNCTURE: CPT

## 2021-02-27 PROCEDURE — 84443 ASSAY THYROID STIM HORMONE: CPT

## 2021-02-27 PROCEDURE — 84436 ASSAY OF TOTAL THYROXINE: CPT

## 2021-02-27 PROCEDURE — 80061 LIPID PANEL: CPT

## 2021-02-27 PROCEDURE — 85025 COMPLETE CBC W/AUTO DIFF WBC: CPT

## 2021-02-27 PROCEDURE — 80053 COMPREHEN METABOLIC PANEL: CPT

## 2021-03-05 ENCOUNTER — OFFICE VISIT (OUTPATIENT)
Dept: PRIMARY CARE CLINIC | Age: 56
End: 2021-03-05
Payer: COMMERCIAL

## 2021-03-05 VITALS
DIASTOLIC BLOOD PRESSURE: 78 MMHG | HEART RATE: 80 BPM | BODY MASS INDEX: 29.7 KG/M2 | WEIGHT: 173 LBS | OXYGEN SATURATION: 97 % | TEMPERATURE: 96.3 F | SYSTOLIC BLOOD PRESSURE: 110 MMHG

## 2021-03-05 DIAGNOSIS — M26.609 TMJ (TEMPOROMANDIBULAR JOINT SYNDROME): ICD-10-CM

## 2021-03-05 DIAGNOSIS — J32.0 CHRONIC MAXILLARY SINUSITIS: Primary | ICD-10-CM

## 2021-03-05 DIAGNOSIS — E03.9 HYPOTHYROIDISM, UNSPECIFIED TYPE: ICD-10-CM

## 2021-03-05 DIAGNOSIS — E78.5 HYPERLIPIDEMIA, UNSPECIFIED HYPERLIPIDEMIA TYPE: ICD-10-CM

## 2021-03-05 DIAGNOSIS — K21.9 GASTROESOPHAGEAL REFLUX DISEASE WITHOUT ESOPHAGITIS: ICD-10-CM

## 2021-03-05 PROCEDURE — 99214 OFFICE O/P EST MOD 30 MIN: CPT | Performed by: FAMILY MEDICINE

## 2021-03-05 ASSESSMENT — ENCOUNTER SYMPTOMS
ALLERGIC/IMMUNOLOGIC NEGATIVE: 1
EYES NEGATIVE: 1
RESPIRATORY NEGATIVE: 1
GASTROINTESTINAL NEGATIVE: 1

## 2021-03-05 NOTE — PROGRESS NOTES
3/5/21     Xiomara Lackey    : 1965 Sex: female   Age: 63 y.o. Chief Complaint   Patient presents with    Sinus Problem    Jaw Pain     has severe arthritis in jaw on CT scan. Will be going to see specialist       Prior to Admission medications    Medication Sig Start Date End Date Taking? Authorizing Provider   levothyroxine (SYNTHROID) 75 MCG tablet TAKE 1 TABLET BY MOUTH ONE TIME A DAY 21  Yes Kishor Kern DO   fluticasone (FLONASE) 50 MCG/ACT nasal spray USE 2 SPRAYS IN EACH NOSTRIL EVERY DAY 20  Yes Historical Provider, MD   estradiol (ESTRACE) 0.1 MG/GM vaginal cream  12/15/20  Yes Historical Provider, MD   ibuprofen (ADVIL;MOTRIN) 800 MG tablet Take 1 tablet by mouth 2 times daily as needed for Pain 21  Yes Runnit DO Erlin   esomeprazole (NEXIUM) 40 MG delayed release capsule TAKE ONE CAPSULE BY MOUTH EVERY MORNING BEFORE BREAKFAST 10/19/20  Yes Runnit DO Erlin   FLUoxetine (PROZAC) 10 MG tablet Take 1 tablet by mouth 2 times daily  Patient taking differently: Take 10 mg by mouth daily  3/20/20  Yes Runnit DO Erlin   calcium citrate (CALCITRATE) 250 MG TABS tablet Take 1 tablet by mouth 2 times daily 19  Yes Kym Prescott PA-C   Cholecalciferol (VITAMIN D-3) 5000 UNITS TABS Take by mouth daily Instructed to hold   Yes Historical Provider, MD          HPI: Patient evaluated today again chronic sinus symptoms that were evaluated by ear nose and throat. Mucosal cyst noted but for now no aggressive surgery. Severe TMJ syndrome and will be following up with oral surgeon on possible intervention. Hypothyroid hyperlipidemia stable. Evaluation with Dr. Deborah Jimenez  and I will follow up with her shortly after. Review of Systems   Constitutional: Negative. HENT: Negative. Eyes: Negative. Respiratory: Negative. Gastrointestinal: Negative. Endocrine: Negative. Genitourinary: Negative. Musculoskeletal: Negative. Skin: Negative. Allergic/Immunologic: Negative. Neurological: Negative. Hematological: Negative. Psychiatric/Behavioral: Negative. Systems review currently stable. Chronic jaw pain will be evaluated by oral surgery.           Current Outpatient Medications:     levothyroxine (SYNTHROID) 75 MCG tablet, TAKE 1 TABLET BY MOUTH ONE TIME A DAY, Disp: 30 tablet, Rfl: 5    fluticasone (FLONASE) 50 MCG/ACT nasal spray, USE 2 SPRAYS IN EACH NOSTRIL EVERY DAY, Disp: , Rfl:     estradiol (ESTRACE) 0.1 MG/GM vaginal cream, , Disp: , Rfl:     ibuprofen (ADVIL;MOTRIN) 800 MG tablet, Take 1 tablet by mouth 2 times daily as needed for Pain, Disp: 60 tablet, Rfl: 1    esomeprazole (NEXIUM) 40 MG delayed release capsule, TAKE ONE CAPSULE BY MOUTH EVERY MORNING BEFORE BREAKFAST, Disp: 30 capsule, Rfl: 5    FLUoxetine (PROZAC) 10 MG tablet, Take 1 tablet by mouth 2 times daily (Patient taking differently: Take 10 mg by mouth daily ), Disp: 60 tablet, Rfl: 5    calcium citrate (CALCITRATE) 250 MG TABS tablet, Take 1 tablet by mouth 2 times daily, Disp: 60 tablet, Rfl: 2    Cholecalciferol (VITAMIN D-3) 5000 UNITS TABS, Take by mouth daily Instructed to hold, Disp: , Rfl:     Allergies   Allergen Reactions    Latex     Prednisone        Social History     Tobacco Use    Smoking status: Never Smoker    Smokeless tobacco: Never Used   Substance Use Topics    Alcohol use: Yes     Comment: social    Drug use: No      Past Surgical History:   Procedure Laterality Date    COLONOSCOPY  1/2015    DILATION AND CURETTAGE OF UTERUS  2007, 2006    HYSTERECTOMY  2008   [address] [facility], [address] [facility], 1990    x 4    OTHER SURGICAL HISTORY  10/8/15    bilateral tmj arthrodesis/steroid injection    UPPER GASTROINTESTINAL ENDOSCOPY  12/17/2014    UPPER GASTROINTESTINAL ENDOSCOPY  04/12/2017    UPPER GASTROINTESTINAL ENDOSCOPY N/A 7/13/2018    EGD BIOPSY performed by Niraj Cutler MD at [facility] 02/27/2021    CALCIUM 9.4 02/27/2021    PROT 7.0 02/27/2021    LABALBU 4.2 02/27/2021    BILITOT 0.5 02/27/2021    ALKPHOS 46 02/27/2021    AST 24 02/27/2021    ALT 19 02/27/2021    LABGLOM >60 02/27/2021    GFRAA >60 02/27/2021      No results found for: PSA, PSADIA   Lab Results   Component Value Date    LABA1C 5.0 03/23/2018    LABA1C 5.7 03/17/2014    LABA1C 5.6 01/10/2014     No results found for: EAG     Plan Per Assessment:  There are no diagnoses linked to this encounter. No follow-ups on file. Dewey Gamino DO    Note was generated with the assistance of voice recognition software. Document was reviewed however may contain grammatical errors.

## 2021-04-02 ENCOUNTER — OFFICE VISIT (OUTPATIENT)
Dept: PRIMARY CARE CLINIC | Age: 56
End: 2021-04-02
Payer: COMMERCIAL

## 2021-04-02 VITALS
HEART RATE: 75 BPM | DIASTOLIC BLOOD PRESSURE: 82 MMHG | OXYGEN SATURATION: 97 % | WEIGHT: 171 LBS | SYSTOLIC BLOOD PRESSURE: 120 MMHG | BODY MASS INDEX: 29.35 KG/M2 | TEMPERATURE: 96.9 F

## 2021-04-02 DIAGNOSIS — E03.9 HYPOTHYROIDISM, UNSPECIFIED TYPE: ICD-10-CM

## 2021-04-02 DIAGNOSIS — F41.9 ANXIETY: ICD-10-CM

## 2021-04-02 DIAGNOSIS — M25.512 CHRONIC LEFT SHOULDER PAIN: ICD-10-CM

## 2021-04-02 DIAGNOSIS — E78.5 HYPERLIPIDEMIA, UNSPECIFIED HYPERLIPIDEMIA TYPE: ICD-10-CM

## 2021-04-02 DIAGNOSIS — M26.609 TMJ (TEMPOROMANDIBULAR JOINT SYNDROME): Primary | ICD-10-CM

## 2021-04-02 DIAGNOSIS — G89.29 CHRONIC LEFT SHOULDER PAIN: ICD-10-CM

## 2021-04-02 PROCEDURE — 99214 OFFICE O/P EST MOD 30 MIN: CPT | Performed by: FAMILY MEDICINE

## 2021-04-02 NOTE — PROGRESS NOTES
21     Carolyn Huston    : 1965 Sex: female   Age: 63 y.o. Chief Complaint   Patient presents with    Jaw Pain     going to see jaw specialist  in [address] Other     did go see dr Wilber Pina        Prior to Admission medications    Medication Sig Start Date End Date Taking? Authorizing Provider   levothyroxine (SYNTHROID) 75 MCG tablet TAKE 1 TABLET BY MOUTH ONE TIME A DAY 21  Yes Kishor Kern,    fluticasone (FLONASE) 50 MCG/ACT nasal spray USE 2 SPRAYS IN EACH NOSTRIL EVERY DAY 20  Yes Historical Provider, MD   estradiol (ESTRACE) 0.1 MG/GM vaginal cream  12/15/20  Yes Historical Provider, MD   ibuprofen (ADVIL;MOTRIN) 800 MG tablet Take 1 tablet by mouth 2 times daily as needed for Pain 21  Yes Marsha Kern DO   esomeprazole (NEXIUM) 40 MG delayed release capsule TAKE ONE CAPSULE BY MOUTH EVERY MORNING BEFORE BREAKFAST 10/19/20  Yes Marsha Kern DO   FLUoxetine (PROZAC) 10 MG tablet Take 1 tablet by mouth 2 times daily  Patient taking differently: Take 10 mg by mouth daily  3/20/20  Yes Marsha Kern DO   calcium citrate (CALCITRATE) 250 MG TABS tablet Take 1 tablet by mouth 2 times daily 19  Yes Princess Marilyn PA-C   Cholecalciferol (VITAMIN D-3) 5000 UNITS TABS Take by mouth daily Instructed to hold   Yes Historical Provider, MD          HPI: Rhoda Coombs is seen today in follow-up on TMJ syndrome hypothyroidism hyperlipidemia anxiety and history of chronic left shoulder discomfort. X-ray will be completed today and then physical therapy recommended. Reassess in about a month. TMJ syndrome is severe and plans are to be evaluated [place] for possible joint replacement. Review of Systems   Constitutional: Negative. HENT: Negative. Eyes: Negative. Respiratory: Negative. Gastrointestinal: Negative. Endocrine: Negative. Genitourinary: Negative. Musculoskeletal: Positive for arthralgias. Skin: Negative. Allergic/Immunologic: Negative. Neurological: Negative. Hematological: Negative. Psychiatric/Behavioral: Negative. Systems review essentially stable aside from shoulder pain as noted.         Current Outpatient Medications:     levothyroxine (SYNTHROID) 75 MCG tablet, TAKE 1 TABLET BY MOUTH ONE TIME A DAY, Disp: 30 tablet, Rfl: 5    fluticasone (FLONASE) 50 MCG/ACT nasal spray, USE 2 SPRAYS IN EACH NOSTRIL EVERY DAY, Disp: , Rfl:     estradiol (ESTRACE) 0.1 MG/GM vaginal cream, , Disp: , Rfl:     ibuprofen (ADVIL;MOTRIN) 800 MG tablet, Take 1 tablet by mouth 2 times daily as needed for Pain, Disp: 60 tablet, Rfl: 1    esomeprazole (NEXIUM) 40 MG delayed release capsule, TAKE ONE CAPSULE BY MOUTH EVERY MORNING BEFORE BREAKFAST, Disp: 30 capsule, Rfl: 5    FLUoxetine (PROZAC) 10 MG tablet, Take 1 tablet by mouth 2 times daily (Patient taking differently: Take 10 mg by mouth daily ), Disp: 60 tablet, Rfl: 5    calcium citrate (CALCITRATE) 250 MG TABS tablet, Take 1 tablet by mouth 2 times daily, Disp: 60 tablet, Rfl: 2    Cholecalciferol (VITAMIN D-3) 5000 UNITS TABS, Take by mouth daily Instructed to hold, Disp: , Rfl:     Allergies   Allergen Reactions    Latex     Prednisone        Social History     Tobacco Use    Smoking status: Never Smoker    Smokeless tobacco: Never Used   Substance Use Topics    Alcohol use: Yes     Comment: social    Drug use: No      Past Surgical History:   Procedure Laterality Date    COLONOSCOPY  1/2015    DILATION AND CURETTAGE OF UTERUS  2007, 2006    HYSTERECTOMY  2008   [facility], [address], [facility], 1990    x 4    OTHER SURGICAL HISTORY  10/8/15    bilateral tmj arthrodesis/steroid injection    UPPER GASTROINTESTINAL ENDOSCOPY  12/17/2014    UPPER GASTROINTESTINAL ENDOSCOPY  04/12/2017    UPPER GASTROINTESTINAL ENDOSCOPY N/A 7/13/2018    EGD BIOPSY performed by Rosalee Garcia MD at [address] [facility] Left 1/16/2019 LEFT WRIST OPEN REDUCTION INTERNAL FIXATION performed by Ayse Mcgregor DO at [facility] Left 7/19/2019    LEFT WRIST  REMOVAL OF HARDWARE performed by Ayse Mcgregor DO at [facility] OR     Family History   Problem Relation Age of Onset    High Blood Pressure Mother     High Cholesterol Mother     Diabetes Father     Obesity Father     Thyroid Disease Father     Stroke Father     High Cholesterol Father     High Blood Pressure Father     Heart Disease Father         CHF    Kidney Disease Father     Heart Attack Father     Thyroid Disease Sister     Celiac Disease Sister     Thyroid Disease Brother     Diabetes Brother     High Blood Pressure Brother     Heart Disease Maternal Grandmother     High Blood Pressure Maternal Grandmother     Diabetes Maternal Grandmother     Diabetes Maternal Grandfather     High Blood Pressure Maternal Grandfather     Thyroid Disease Paternal Grandmother     Heart Disease Paternal Grandfather     Diabetes Maternal Aunt     Diabetes Maternal Uncle      Past Medical History:   Diagnosis Date    Anemia     Anxiety     Celiac disease     Difficult intubation     Intubated successfully 1/2009 with Glidescope #3, 7.0 ETT.  Eating disorder 10 years ago    anorexia, Dr. Xuan Munguiaates monitoring.  History of blood transfusion 1993.  Hypoglycemia     Hypothyroidism     Latex allergy, contact dermatitis     PONV (postoperative nausea and vomiting)     TMJ (dislocation of temporomandibular joint)     Vitamin D deficiency     follows with Dr. Cooper Malawian:    04/02/21 0938   BP: 120/82   Pulse: 75   Temp: 96.9 °F (36.1 °C)   SpO2: 97%   Weight: 171 lb (77.6 kg)     BP Readings from Last 3 Encounters:   04/02/21 120/82   03/05/21 110/78   02/05/21 120/72        Physical Exam  Vitals signs and nursing note reviewed. Constitutional:       Appearance: She is well-developed. HENT:      Head: Normocephalic.       Right Ear: External ear normal.      Left Ear: External ear normal.      Nose: Nose normal.   Eyes:      Conjunctiva/sclera: Conjunctivae normal.      Pupils: Pupils are equal, round, and reactive to light. Neck:      Musculoskeletal: Normal range of motion and neck supple. Cardiovascular:      Rate and Rhythm: Normal rate. Pulmonary:      Breath sounds: Normal breath sounds. Abdominal:      General: Bowel sounds are normal.      Palpations: Abdomen is soft. Musculoskeletal: Normal range of motion. Skin:     General: Skin is warm and dry. Neurological:      Mental Status: She is alert and oriented to person, place, and time. Psychiatric:         Behavior: Behavior normal.        Today's vitals physical examination stable. Recommendations for shoulder x-ray and then follow-up after about 4 weeks of therapy. Medications as prescribed. Last labs were stable in February. Evaluation to take place in [place] for TMJ replacements. Plan Per Assessment:  Merary Groves was seen today for jaw pain and other. Diagnoses and all orders for this visit:    TMJ (temporomandibular joint syndrome)    Hypothyroidism, unspecified type    Hyperlipidemia, unspecified hyperlipidemia type    Anxiety    Chronic left shoulder pain  -     XR SHOULDER LEFT (MIN 2 VIEWS); Future  -     External Referral To Physical Therapy            Return in about 4 weeks (around 4/30/2021). Felix Silva DO    Note was generated with the assistance of voice recognition software. Document was reviewed however may contain grammatical errors.

## 2021-05-10 ENCOUNTER — HOSPITAL ENCOUNTER (OUTPATIENT)
Dept: CT IMAGING | Age: 56
Discharge: HOME | End: 2021-05-12
Payer: COMMERCIAL

## 2021-05-10 ENCOUNTER — HOSPITAL ENCOUNTER (OUTPATIENT)
Dept: MRI IMAGING | Age: 56
Discharge: HOME | End: 2021-05-12
Payer: COMMERCIAL

## 2021-05-10 DIAGNOSIS — M26.619: ICD-10-CM

## 2021-05-10 DIAGNOSIS — M26.69 TEMPOROMANDIBULAR JOINT SOUNDS ON OPENING AND/OR CLOSING THE JAW: ICD-10-CM

## 2021-05-10 PROCEDURE — 6360000004 HC RX CONTRAST MEDICATION: Performed by: RADIOLOGY

## 2021-05-10 PROCEDURE — 70551 MRI BRAIN STEM W/O DYE: CPT

## 2021-05-10 PROCEDURE — 2580000003 HC RX 258: Performed by: RADIOLOGY

## 2021-05-10 PROCEDURE — 70496 CT ANGIOGRAPHY HEAD: CPT

## 2021-05-10 RX ORDER — SODIUM CHLORIDE 0.9 % (FLUSH) 0.9 %
10 SYRINGE (ML) INJECTION ONCE
Status: COMPLETED | OUTPATIENT
Start: 2021-05-10 | End: 2021-05-10

## 2021-05-10 RX ADMIN — Medication 10 ML: at 19:45

## 2021-05-10 RX ADMIN — IOPAMIDOL 60 ML: 755 INJECTION, SOLUTION INTRAVENOUS at 19:45

## 2021-06-10 ENCOUNTER — OFFICE VISIT (OUTPATIENT)
Dept: PRIMARY CARE CLINIC | Age: 56
End: 2021-06-10
Payer: COMMERCIAL

## 2021-06-10 VITALS
SYSTOLIC BLOOD PRESSURE: 126 MMHG | TEMPERATURE: 97 F | HEIGHT: 64 IN | HEART RATE: 75 BPM | WEIGHT: 172 LBS | OXYGEN SATURATION: 96 % | DIASTOLIC BLOOD PRESSURE: 84 MMHG | RESPIRATION RATE: 18 BRPM | BODY MASS INDEX: 29.37 KG/M2

## 2021-06-10 DIAGNOSIS — E03.9 HYPOTHYROIDISM, UNSPECIFIED TYPE: Primary | ICD-10-CM

## 2021-06-10 DIAGNOSIS — G89.29 CHRONIC LEFT SHOULDER PAIN: ICD-10-CM

## 2021-06-10 DIAGNOSIS — M25.512 CHRONIC LEFT SHOULDER PAIN: ICD-10-CM

## 2021-06-10 DIAGNOSIS — M26.609 TMJ (TEMPOROMANDIBULAR JOINT SYNDROME): ICD-10-CM

## 2021-06-10 DIAGNOSIS — E78.5 HYPERLIPIDEMIA, UNSPECIFIED HYPERLIPIDEMIA TYPE: ICD-10-CM

## 2021-06-10 DIAGNOSIS — F41.9 ANXIETY: ICD-10-CM

## 2021-06-10 PROCEDURE — 99214 OFFICE O/P EST MOD 30 MIN: CPT | Performed by: FAMILY MEDICINE

## 2021-06-10 ASSESSMENT — ENCOUNTER SYMPTOMS
EYES NEGATIVE: 1
ALLERGIC/IMMUNOLOGIC NEGATIVE: 1
RESPIRATORY NEGATIVE: 1
GASTROINTESTINAL NEGATIVE: 1

## 2021-06-10 NOTE — PROGRESS NOTES
6/10/21     Patricia Vazquez    : 1965 Sex: female   Age: 61 y.o. Chief Complaint   Patient presents with    Follow-up     6 weeks, surgery on jaw in july.  Hypothyroidism    Hyperlipidemia       Prior to Admission medications    Medication Sig Start Date End Date Taking? Authorizing Provider   levothyroxine (SYNTHROID) 75 MCG tablet TAKE 1 TABLET BY MOUTH ONE TIME A DAY 21  Yes Kishor Kern,    fluticasone (FLONASE) 50 MCG/ACT nasal spray USE 2 SPRAYS IN EACH NOSTRIL EVERY DAY 20  Yes Historical Provider, MD   estradiol (ESTRACE) 0.1 MG/GM vaginal cream  12/15/20  Yes Historical Provider, MD   ibuprofen (ADVIL;MOTRIN) 800 MG tablet Take 1 tablet by mouth 2 times daily as needed for Pain 21  Yes Clarisa Kern DO   esomeprazole (NEXIUM) 40 MG delayed release capsule TAKE ONE CAPSULE BY MOUTH EVERY MORNING BEFORE BREAKFAST 10/19/20  Yes Clarisa Kern DO   FLUoxetine (PROZAC) 10 MG tablet Take 1 tablet by mouth 2 times daily  Patient taking differently: Take 10 mg by mouth daily  3/20/20  Yes Clarisa Kern DO   calcium citrate (CALCITRATE) 250 MG TABS tablet Take 1 tablet by mouth 2 times daily 19  Yes Malgorzata Cadena PA-C   Cholecalciferol (VITAMIN D-3) 5000 UNITS TABS Take by mouth daily Instructed to hold   Yes Historical Provider, MD          HPI: Jagruti Marti is seen today follow-up on hypothyroid hyperlipidemia anxiety all of which is stable. Plans for TMJ surgery . Joint replacement on the right in July. The left side will follow sometime after. Present problems with shoulder discomfort have improved. Presently doing home exercise. Review of Systems   Constitutional: Negative. HENT: Negative. Eyes: Negative. Respiratory: Negative. Gastrointestinal: Negative. Endocrine: Negative. Genitourinary: Negative. Musculoskeletal: Negative. Skin: Negative. Allergic/Immunologic: Negative. Neurological: Negative. Hematological: Negative. Psychiatric/Behavioral: Negative. Systems reviewed today is stable. TMJ abnormalities as noted.         Current Outpatient Medications:     levothyroxine (SYNTHROID) 75 MCG tablet, TAKE 1 TABLET BY MOUTH ONE TIME A DAY, Disp: 30 tablet, Rfl: 5    fluticasone (FLONASE) 50 MCG/ACT nasal spray, USE 2 SPRAYS IN EACH NOSTRIL EVERY DAY, Disp: , Rfl:     estradiol (ESTRACE) 0.1 MG/GM vaginal cream, , Disp: , Rfl:     ibuprofen (ADVIL;MOTRIN) 800 MG tablet, Take 1 tablet by mouth 2 times daily as needed for Pain, Disp: 60 tablet, Rfl: 1    esomeprazole (NEXIUM) 40 MG delayed release capsule, TAKE ONE CAPSULE BY MOUTH EVERY MORNING BEFORE BREAKFAST, Disp: 30 capsule, Rfl: 5    FLUoxetine (PROZAC) 10 MG tablet, Take 1 tablet by mouth 2 times daily (Patient taking differently: Take 10 mg by mouth daily ), Disp: 60 tablet, Rfl: 5    calcium citrate (CALCITRATE) 250 MG TABS tablet, Take 1 tablet by mouth 2 times daily, Disp: 60 tablet, Rfl: 2    Cholecalciferol (VITAMIN D-3) 5000 UNITS TABS, Take by mouth daily Instructed to hold, Disp: , Rfl:     Allergies   Allergen Reactions    Latex     Prednisone        Social History     Tobacco Use    Smoking status: Never Smoker    Smokeless tobacco: Never Used   Substance Use Topics    Alcohol use: Yes     Comment: social    Drug use: No      Past Surgical History:   Procedure Laterality Date    COLONOSCOPY  1/2015    DILATION AND CURETTAGE OF UTERUS  2007, 2006    HYSTERECTOMY  2008   [address] [facility], [address] [facility]    x 4    OTHER SURGICAL HISTORY  10/8/15    bilateral tmj arthrodesis/steroid injection    UPPER GASTROINTESTINAL ENDOSCOPY  12/17/2014    UPPER GASTROINTESTINAL ENDOSCOPY  04/12/2017    UPPER GASTROINTESTINAL ENDOSCOPY N/A 7/13/2018    EGD BIOPSY performed by Anthony Langley MD at [address] Left 1/16/2019    LEFT WRIST OPEN REDUCTION INTERNAL FIXATION performed by Marisol Gaston Patel Weldon DO at [address] [facility] [facility] Left 7/19/2019    LEFT WRIST  REMOVAL OF HARDWARE performed by Dinorah White DO at [facility] OR     Family History   Problem Relation Age of Onset    High Blood Pressure Mother     High Cholesterol Mother     Diabetes Father     Obesity Father     Thyroid Disease Father     Stroke Father     High Cholesterol Father     High Blood Pressure Father     Heart Disease Father         CHF    Kidney Disease Father     Heart Attack Father     Thyroid Disease Sister     Celiac Disease Sister     Thyroid Disease Brother     Diabetes Brother     High Blood Pressure Brother     Heart Disease Maternal Grandmother     High Blood Pressure Maternal Grandmother     Diabetes Maternal Grandmother     Diabetes Maternal Grandfather     High Blood Pressure Maternal Grandfather     Thyroid Disease Paternal Grandmother     Heart Disease Paternal Grandfather     Diabetes Maternal Aunt     Diabetes Maternal Uncle      Past Medical History:   Diagnosis Date    Anemia     Anxiety     Celiac disease     Difficult intubation     Intubated successfully 1/2009 with Glidescope #3, 7.0 ETT.  Eating disorder 10 years ago    anorexia, Dr. Lauren Sanchez monitoring.  History of blood transfusion 1993.  Hypoglycemia     Hypothyroidism     Latex allergy, contact dermatitis     PONV (postoperative nausea and vomiting)     TMJ (dislocation of temporomandibular joint)     Vitamin D deficiency     follows with Dr. Louis Chill:    06/10/21 1443   BP: 126/84   Pulse: 75   Resp: 18   Temp: 97 °F (36.1 °C)   SpO2: 96%   Weight: 172 lb (78 kg)   Height: 5' 4\" (1.626 m)     BP Readings from Last 3 Encounters:   06/10/21 126/84   04/02/21 120/82   03/05/21 110/78        Physical Exam  Vitals and nursing note reviewed. Constitutional:       Appearance: She is well-developed. HENT:      Head: Normocephalic.       Right Ear: External ear normal.      Left Ear: External ear normal.      Nose: Nose normal.   Eyes:      Conjunctiva/sclera: Conjunctivae normal.      Pupils: Pupils are equal, round, and reactive to light. Cardiovascular:      Rate and Rhythm: Normal rate. Pulmonary:      Breath sounds: Normal breath sounds. Abdominal:      General: Bowel sounds are normal.      Palpations: Abdomen is soft. Musculoskeletal:         General: Normal range of motion. Cervical back: Normal range of motion and neck supple. Skin:     General: Skin is warm and dry. Neurological:      Mental Status: She is alert and oriented to person, place, and time. Psychiatric:         Behavior: Behavior normal.        Present vitals physical exam stable. We will maintain current meds and care. Follow-up visit in 6 weeks after surgery and sooner if problems. Labs last completed in February. We will repeat with next visit. Plan Per Assessment:  There are no diagnoses linked to this encounter. No follow-ups on file. Felix Silva DO    Note was generated with the assistance of voice recognition software. Document was reviewed however may contain grammatical errors.

## 2021-06-15 RX ORDER — FLUOXETINE 10 MG/1
CAPSULE ORAL
Qty: 60 CAPSULE | Refills: 5 | Status: SHIPPED
Start: 2021-06-15 | End: 2022-03-04 | Stop reason: SDUPTHER

## 2021-06-25 LAB
CHOLESTEROL, TOTAL: 192 MG/DL (ref 0–199)
GLUCOSE BLD-MCNC: 92 MG/DL (ref 74–107)
HDLC SERPL-MCNC: 71 MG/DL
LDL CHOLESTEROL CALCULATED: 111 MG/DL (ref 0–99)
TRIGL SERPL-MCNC: 52 MG/DL (ref 0–149)

## 2021-07-08 NOTE — PROGRESS NOTES
OP: SURGEON:  Nadia Staples D.O  DATE OF PROCEDURE:  01/16/2019  OPERATION:  Open reduction internal fixation, left wrist distal radius. Subjective:  Loy Woodson is approximately 12 weeks follow-up from the above surgery. Patient is PWB on that extremity. Patient has maintained her velcro brace to LUE, as removing this brace outside of work. Pain to extremity is mild and is not taking pain medication, has been using Aleve 220 mg PO daily PRN. She states that the numbness and tingling in the thumb index and long fingers is not there at this time. Patient has been wearing her Velcro wrist brace at night due to carpal tunnel symptoms. She needs her calcium and vitamin D supplementation. Denies Calf pain. Denies fevers or chills. patient states she still notices some difficulty with fine motor activities for example  coffee filters. He has been proceeding in therapy, states she would like to continue a home exercise program due to the cost.    Review of Systems -    General ROS: negative for - chills, fatigue, fever or night sweats  Respiratory ROS: no cough, shortness of breath, or wheezing  Cardiovascular ROS: no chest pain or dyspnea on exertion  Gastrointestinal ROS: no abdominal pain, nausea, vomiting, diarrhea, constipation,or black or bloody stools  Genitourinary: no hematuria, dysuria, or incontinence   Musculoskeletal ROS: negative for -back or neck pain or stiffness, also see HPI  Neurological ROS: no TIA or stroke symptoms       Objective:    General: Alert and oriented X 3, normocephalic atraumatic, external ears and eye normal, sclera clear, no acute distress, respirations easy and unlabored with no audible wheezes, skin warm and dry, speech and dress appropriate for noted age, affect euthymic.     Extremity:  Left Upper Extremity  Skin is clean dry and intact  Mild edema noted  Radial pulse palpable, fingers warm with BCR  Flex/extension intact to wrist, thumb and fingers  Finger opposition Presents with genital itching and burning, frequency, dysuria starting Monday.  Patient was seen by [facility] on 7/6 and was diagnosed with a yeast infection and negative for a UTI.  Patient was prescribed terconazole suppositories for 3 days. Patient has taken 2 doses, symptoms are still present.     intact  Finger adduction/abduction intact  Finger crossover intact  Subjectively states sensation intact to radial/medial/ulnar distribution  Incision well healed with no redness, drainage or warmth  Patient does still have stiff flexion and extension at the wrist, approximately 20° in each direction  Patient is able make a full fist  Pronation without limitation, patient is lacking approximately 10° of supination  No tenderness to palpation at fracture site or incision    BP 99/65 (Site: Left Upper Arm, Position: Sitting, Cuff Size: Medium Adult)   Pulse 70   Ht 5' 4\" (1.626 m)   Wt 160 lb (72.6 kg)   BMI 27.46 kg/m²     XR:   X-ray left wrist demonstrates distal radius ORIF without evidence of hardware failure. No change in alignment since postop films. Continued interval healing is appreciated. Assessment:   Diagnosis Orders   1.  Closed Colles' fracture of left radius with routine healing, subsequent encounter         Plan:  Progress to weightbearing as tolerated on left wrist  May discontinue use of Velcro brace, recommended to continue to use this at night for carpal tunnel symptoms only  Recommended the patient continue with home exercise plan as outlined by occupational therapy, would be beneficial to continue to aggressively work on ROM of wrist  Follow up in 3 months    Electronically signed by Lakisha Beauchamp PA-C on 4/11/2019 at 7:51 AM    Note: This report was completed using computerize voiced recognition Margaret 86 effort has been made to ensure accuracy; however, inadvertent computerized transcription errors may be present

## 2021-08-10 RX ORDER — LEVOTHYROXINE SODIUM 0.07 MG/1
TABLET ORAL
Qty: 30 TABLET | Refills: 5 | Status: SHIPPED
Start: 2021-08-10 | End: 2022-02-07 | Stop reason: SDUPTHER

## 2021-08-13 ENCOUNTER — OFFICE VISIT (OUTPATIENT)
Dept: PRIMARY CARE CLINIC | Age: 56
End: 2021-08-13
Payer: COMMERCIAL

## 2021-08-13 VITALS
BODY MASS INDEX: 29.02 KG/M2 | DIASTOLIC BLOOD PRESSURE: 70 MMHG | SYSTOLIC BLOOD PRESSURE: 124 MMHG | WEIGHT: 170 LBS | OXYGEN SATURATION: 99 % | HEIGHT: 64 IN | TEMPERATURE: 97.2 F | HEART RATE: 76 BPM

## 2021-08-13 DIAGNOSIS — E78.5 HYPERLIPIDEMIA, UNSPECIFIED HYPERLIPIDEMIA TYPE: ICD-10-CM

## 2021-08-13 DIAGNOSIS — F32.89 OTHER DEPRESSION: ICD-10-CM

## 2021-08-13 DIAGNOSIS — M26.609 TMJ (TEMPOROMANDIBULAR JOINT SYNDROME): Primary | ICD-10-CM

## 2021-08-13 DIAGNOSIS — E03.9 HYPOTHYROIDISM, UNSPECIFIED TYPE: ICD-10-CM

## 2021-08-13 DIAGNOSIS — R51.9 CHRONIC NONINTRACTABLE HEADACHE, UNSPECIFIED HEADACHE TYPE: ICD-10-CM

## 2021-08-13 DIAGNOSIS — G89.29 CHRONIC NONINTRACTABLE HEADACHE, UNSPECIFIED HEADACHE TYPE: ICD-10-CM

## 2021-08-13 PROCEDURE — 99214 OFFICE O/P EST MOD 30 MIN: CPT | Performed by: FAMILY MEDICINE

## 2021-08-13 NOTE — PROGRESS NOTES
21     Chris Healy    : 1965 Sex: female   Age: 61 y.o. Chief Complaint   Patient presents with    Hypothyroidism    Anxiety       Prior to Admission medications    Medication Sig Start Date End Date Taking? Authorizing Provider   levothyroxine (SYNTHROID) 75 MCG tablet TAKE 1 TABLET BY MOUTH ONE TIME A DAY 8/10/21  Yes Kishor Kern, DO   FLUoxetine (PROZAC) 10 MG capsule TAKE 1 CAPSULE BY MOUTH 2 TIMES DAILY. 6/15/21  Yes Kishor Kern, DO   fluticasone (FLONASE) 50 MCG/ACT nasal spray USE 2 SPRAYS IN EACH NOSTRIL EVERY DAY 20  Yes Historical Provider, MD   estradiol (ESTRACE) 0.1 MG/GM vaginal cream  12/15/20  Yes Historical Provider, MD   ibuprofen (ADVIL;MOTRIN) 800 MG tablet Take 1 tablet by mouth 2 times daily as needed for Pain 21  Yes Gus Kern DO   esomeprazole (NEXIUM) 40 MG delayed release capsule TAKE ONE CAPSULE BY MOUTH EVERY MORNING BEFORE BREAKFAST 10/19/20  Yes Gus Kern DO   calcium citrate (CALCITRATE) 250 MG TABS tablet Take 1 tablet by mouth 2 times daily 19  Yes Teri Tejada PA-C   Cholecalciferol (VITAMIN D-3) 5000 UNITS TABS Take by mouth daily Instructed to hold   Yes Historical Provider, MD          HPI: Tori Salinas is in today medical follow-up on severe TMJ syndrome underwent surgical treatment [address] [place] [address]. She is coming along fairly well at this time. Headaches have improved. Feeling wellbeing improved. Hypothyroidism hyperlipidemia depression all stable but some mild increase in anxiety since surgery. Fluoxetine increased to 20 mg daily. Labs will be completed with next visit and plan that in 1 month. Review of Systems   Constitutional: Negative. HENT: Negative. Eyes: Negative. Respiratory: Negative. Gastrointestinal: Negative. Endocrine: Negative. Genitourinary: Negative. Musculoskeletal: Negative. Skin: Negative. Allergic/Immunologic: Negative. Neurological: Negative. Hematological: Negative. Psychiatric/Behavioral: Negative. Today systems review stable Meds are well-tolerated maintain as prescribed. Current Outpatient Medications:     levothyroxine (SYNTHROID) 75 MCG tablet, TAKE 1 TABLET BY MOUTH ONE TIME A DAY, Disp: 30 tablet, Rfl: 5    FLUoxetine (PROZAC) 10 MG capsule, TAKE 1 CAPSULE BY MOUTH 2 TIMES DAILY. , Disp: 60 capsule, Rfl: 5    fluticasone (FLONASE) 50 MCG/ACT nasal spray, USE 2 SPRAYS IN EACH NOSTRIL EVERY DAY, Disp: , Rfl:     estradiol (ESTRACE) 0.1 MG/GM vaginal cream, , Disp: , Rfl:     ibuprofen (ADVIL;MOTRIN) 800 MG tablet, Take 1 tablet by mouth 2 times daily as needed for Pain, Disp: 60 tablet, Rfl: 1    esomeprazole (NEXIUM) 40 MG delayed release capsule, TAKE ONE CAPSULE BY MOUTH EVERY MORNING BEFORE BREAKFAST, Disp: 30 capsule, Rfl: 5    calcium citrate (CALCITRATE) 250 MG TABS tablet, Take 1 tablet by mouth 2 times daily, Disp: 60 tablet, Rfl: 2    Cholecalciferol (VITAMIN D-3) 5000 UNITS TABS, Take by mouth daily Instructed to hold, Disp: , Rfl:     Allergies   Allergen Reactions    Latex     Prednisone        Social History     Tobacco Use    Smoking status: Never Smoker    Smokeless tobacco: Never Used   Substance Use Topics    Alcohol use: Yes     Comment: social    Drug use: No      Past Surgical History:   Procedure Laterality Date    COLONOSCOPY  1/2015    DILATION AND CURETTAGE OF UTERUS  2007, 2006    HYSTERECTOMY  2008   [address] [facility], [facility]    x 4    OTHER SURGICAL HISTORY  10/8/15    bilateral tmj arthrodesis/steroid injection    UPPER GASTROINTESTINAL ENDOSCOPY  12/17/2014    UPPER GASTROINTESTINAL ENDOSCOPY  04/12/2017    UPPER GASTROINTESTINAL ENDOSCOPY N/A 7/13/2018    EGD BIOPSY performed by Lane Cain MD at [address] Left 1/16/2019    LEFT WRIST OPEN REDUCTION INTERNAL FIXATION performed by Romana Brantley DO at [facility] WRIST FRACTURE SURGERY Left 7/19/2019    LEFT WRIST  REMOVAL OF HARDWARE performed by Nancy Ramesh DO at [facility] OR     Family History   Problem Relation Age of Onset    High Blood Pressure Mother     High Cholesterol Mother     Diabetes Father     Obesity Father     Thyroid Disease Father     Stroke Father     High Cholesterol Father     High Blood Pressure Father     Heart Disease Father         CHF    Kidney Disease Father     Heart Attack Father     Thyroid Disease Sister     Celiac Disease Sister     Thyroid Disease Brother     Diabetes Brother     High Blood Pressure Brother     Heart Disease Maternal Grandmother     High Blood Pressure Maternal Grandmother     Diabetes Maternal Grandmother     Diabetes Maternal Grandfather     High Blood Pressure Maternal Grandfather     Thyroid Disease Paternal Grandmother     Heart Disease Paternal Grandfather     Diabetes Maternal Aunt     Diabetes Maternal Uncle      Past Medical History:   Diagnosis Date    Anemia     Anxiety     Celiac disease     Difficult intubation     Intubated successfully 1/2009 with Glidescope #3, 7.0 ETT.  Eating disorder 10 years ago    anorexia, Dr. Silva Yang monitoring.  History of blood transfusion 1993.  Hypoglycemia     Hypothyroidism     Latex allergy, contact dermatitis     PONV (postoperative nausea and vomiting)     TMJ (dislocation of temporomandibular joint)     Vitamin D deficiency     follows with Dr. Chirinos Keepers:    08/13/21 1029   BP: 124/70   Pulse: 76   Temp: 97.2 °F (36.2 °C)   SpO2: 99%   Weight: 170 lb (77.1 kg)   Height: 5' 4\" (1.626 m)     BP Readings from Last 3 Encounters:   08/13/21 124/70   06/10/21 126/84   04/02/21 120/82        Physical Exam  Vitals and nursing note reviewed. Constitutional:       Appearance: She is well-developed. HENT:      Head: Normocephalic.       Right Ear: External ear normal.      Left Ear: External ear normal.      Nose: Nose normal. Eyes:      Conjunctiva/sclera: Conjunctivae normal.      Pupils: Pupils are equal, round, and reactive to light. Cardiovascular:      Rate and Rhythm: Normal rate. Pulmonary:      Breath sounds: Normal breath sounds. Abdominal:      General: Bowel sounds are normal.      Palpations: Abdomen is soft. Musculoskeletal:         General: Normal range of motion. Cervical back: Normal range of motion and neck supple. Skin:     General: Skin is warm and dry. Neurological:      Mental Status: She is alert and oriented to person, place, and time. Psychiatric:         Behavior: Behavior normal.        Present vitals physical examination stable. We will maintain current meds care. Reassessment with me 1 month and sooner if problems. Plan Per Assessment:  Reid Jain was seen today for hypothyroidism and anxiety. Diagnoses and all orders for this visit:    TMJ (temporomandibular joint syndrome)  -     CBC Auto Differential; Future  -     Comprehensive Metabolic Panel; Future  -     Lipid Panel; Future  -     T4; Future  -     TSH without Reflex; Future    Chronic nonintractable headache, unspecified headache type    Hypothyroidism, unspecified type  -     CBC Auto Differential; Future  -     Comprehensive Metabolic Panel; Future  -     Lipid Panel; Future  -     T4; Future  -     TSH without Reflex; Future    Hyperlipidemia, unspecified hyperlipidemia type  -     CBC Auto Differential; Future  -     Comprehensive Metabolic Panel; Future  -     Lipid Panel; Future  -     T4; Future  -     TSH without Reflex; Future    Other depression  -     CBC Auto Differential; Future  -     Comprehensive Metabolic Panel; Future  -     Lipid Panel; Future  -     T4; Future  -     TSH without Reflex; Future            No follow-ups on file. Stalin Corbin DO    Note was generated with the assistance of voice recognition software. Document was reviewed however may contain grammatical errors.

## 2021-09-14 ENCOUNTER — HOSPITAL ENCOUNTER (OUTPATIENT)
Age: 56
Discharge: HOME | End: 2021-09-14
Payer: COMMERCIAL

## 2021-09-14 DIAGNOSIS — M26.609 TMJ (TEMPOROMANDIBULAR JOINT SYNDROME): ICD-10-CM

## 2021-09-14 DIAGNOSIS — F32.89 OTHER DEPRESSION: ICD-10-CM

## 2021-09-14 DIAGNOSIS — E03.9 HYPOTHYROIDISM, UNSPECIFIED TYPE: ICD-10-CM

## 2021-09-14 DIAGNOSIS — E78.5 HYPERLIPIDEMIA, UNSPECIFIED HYPERLIPIDEMIA TYPE: ICD-10-CM

## 2021-09-14 LAB
ALBUMIN SERPL-MCNC: 4.5 G/DL (ref 3.5–5.2)
ALP BLD-CCNC: 67 U/L (ref 35–104)
ALT SERPL-CCNC: 27 U/L (ref 0–32)
ANION GAP SERPL CALCULATED.3IONS-SCNC: 9 MMOL/L (ref 7–16)
AST SERPL-CCNC: 29 U/L (ref 0–31)
BASOPHILS ABSOLUTE: 0.04 E9/L (ref 0–0.2)
BASOPHILS RELATIVE PERCENT: 1.2 % (ref 0–2)
BILIRUB SERPL-MCNC: 0.4 MG/DL (ref 0–1.2)
BUN BLDV-MCNC: 12 MG/DL (ref 6–20)
CALCIUM SERPL-MCNC: 10.1 MG/DL (ref 8.6–10.2)
CHLORIDE BLD-SCNC: 103 MMOL/L (ref 98–107)
CHOLESTEROL, TOTAL: 195 MG/DL (ref 0–199)
CO2: 29 MMOL/L (ref 22–29)
CREAT SERPL-MCNC: 0.8 MG/DL (ref 0.5–1)
EOSINOPHILS ABSOLUTE: 0.17 E9/L (ref 0.05–0.5)
EOSINOPHILS RELATIVE PERCENT: 5 % (ref 0–6)
GFR AFRICAN AMERICAN: >60
GFR NON-AFRICAN AMERICAN: >60 ML/MIN/1.73
GLUCOSE BLD-MCNC: 97 MG/DL (ref 74–99)
HCT VFR BLD CALC: 37.1 % (ref 34–48)
HDLC SERPL-MCNC: 66 MG/DL
HEMOGLOBIN: 12.6 G/DL (ref 11.5–15.5)
IMMATURE GRANULOCYTES #: 0.02 E9/L
IMMATURE GRANULOCYTES %: 0.6 % (ref 0–5)
LDL CHOLESTEROL CALCULATED: 117 MG/DL (ref 0–99)
LYMPHOCYTES ABSOLUTE: 1.07 E9/L (ref 1.5–4)
LYMPHOCYTES RELATIVE PERCENT: 31.4 % (ref 20–42)
MCH RBC QN AUTO: 32.6 PG (ref 26–35)
MCHC RBC AUTO-ENTMCNC: 34 % (ref 32–34.5)
MCV RBC AUTO: 95.9 FL (ref 80–99.9)
MONOCYTES ABSOLUTE: 0.32 E9/L (ref 0.1–0.95)
MONOCYTES RELATIVE PERCENT: 9.4 % (ref 2–12)
NEUTROPHILS ABSOLUTE: 1.79 E9/L (ref 1.8–7.3)
NEUTROPHILS RELATIVE PERCENT: 52.4 % (ref 43–80)
PDW BLD-RTO: 12.2 FL (ref 11.5–15)
PLATELET # BLD: 205 E9/L (ref 130–450)
PMV BLD AUTO: 8.7 FL (ref 7–12)
POTASSIUM SERPL-SCNC: 4.6 MMOL/L (ref 3.5–5)
RBC # BLD: 3.87 E12/L (ref 3.5–5.5)
SODIUM BLD-SCNC: 141 MMOL/L (ref 132–146)
T4 TOTAL: 7.2 MCG/DL (ref 4.5–11.7)
TOTAL PROTEIN: 7.2 G/DL (ref 6.4–8.3)
TRIGL SERPL-MCNC: 60 MG/DL (ref 0–149)
TSH SERPL DL<=0.05 MIU/L-ACNC: 0.77 UIU/ML (ref 0.27–4.2)
VLDLC SERPL CALC-MCNC: 12 MG/DL
WBC # BLD: 3.4 E9/L (ref 4.5–11.5)

## 2021-09-14 PROCEDURE — 84436 ASSAY OF TOTAL THYROXINE: CPT

## 2021-09-14 PROCEDURE — 85025 COMPLETE CBC W/AUTO DIFF WBC: CPT

## 2021-09-14 PROCEDURE — 80053 COMPREHEN METABOLIC PANEL: CPT

## 2021-09-14 PROCEDURE — 80061 LIPID PANEL: CPT

## 2021-09-14 PROCEDURE — 36415 COLL VENOUS BLD VENIPUNCTURE: CPT

## 2021-09-14 PROCEDURE — 84443 ASSAY THYROID STIM HORMONE: CPT

## 2021-09-17 ENCOUNTER — OFFICE VISIT (OUTPATIENT)
Dept: PRIMARY CARE CLINIC | Age: 56
End: 2021-09-17
Payer: COMMERCIAL

## 2021-09-17 VITALS
OXYGEN SATURATION: 98 % | WEIGHT: 170 LBS | SYSTOLIC BLOOD PRESSURE: 120 MMHG | TEMPERATURE: 97.3 F | BODY MASS INDEX: 29.18 KG/M2 | HEART RATE: 71 BPM | DIASTOLIC BLOOD PRESSURE: 70 MMHG

## 2021-09-17 DIAGNOSIS — T84.84XA PAINFUL ORTHOPAEDIC HARDWARE (HCC): ICD-10-CM

## 2021-09-17 DIAGNOSIS — E03.9 HYPOTHYROIDISM, UNSPECIFIED TYPE: Primary | ICD-10-CM

## 2021-09-17 DIAGNOSIS — M26.609 TMJ (TEMPOROMANDIBULAR JOINT SYNDROME): ICD-10-CM

## 2021-09-17 PROCEDURE — 99214 OFFICE O/P EST MOD 30 MIN: CPT | Performed by: FAMILY MEDICINE

## 2021-09-17 RX ORDER — GABAPENTIN 300 MG/1
CAPSULE ORAL
Qty: 90 CAPSULE | Refills: 0 | Status: SHIPPED
Start: 2021-09-17 | End: 2021-12-17

## 2021-09-17 RX ORDER — GABAPENTIN 300 MG/1
CAPSULE ORAL NIGHTLY
COMMUNITY
Start: 2021-08-17 | End: 2021-09-17 | Stop reason: SDUPTHER

## 2021-09-17 ASSESSMENT — ENCOUNTER SYMPTOMS
ALLERGIC/IMMUNOLOGIC NEGATIVE: 1
RESPIRATORY NEGATIVE: 1
EYES NEGATIVE: 1
GASTROINTESTINAL NEGATIVE: 1

## 2021-09-17 NOTE — PROGRESS NOTES
21     Jameson Fitzpatrick    : 1965 Sex: female   Age: 61 y.o. Chief Complaint   Patient presents with    Discuss Labs    Anxiety       Prior to Admission medications    Medication Sig Start Date End Date Taking? Authorizing Provider   gabapentin (NEURONTIN) 300 MG capsule nightly. 21  Yes Historical Provider, MD   levothyroxine (SYNTHROID) 75 MCG tablet TAKE 1 TABLET BY MOUTH ONE TIME A DAY 8/10/21  Yes Kishor Kern DO   FLUoxetine (PROZAC) 10 MG capsule TAKE 1 CAPSULE BY MOUTH 2 TIMES DAILY. 6/15/21  Yes Kishor Kern DO   fluticasone (FLONASE) 50 MCG/ACT nasal spray USE 2 SPRAYS IN EACH NOSTRIL EVERY DAY 20  Yes Historical Provider, MD   estradiol (ESTRACE) 0.1 MG/GM vaginal cream  12/15/20  Yes Historical Provider, MD   ibuprofen (ADVIL;MOTRIN) 800 MG tablet Take 1 tablet by mouth 2 times daily as needed for Pain 21  Yes Jene Lundborg Traikoff, DO   esomeprazole (NEXIUM) 40 MG delayed release capsule TAKE ONE CAPSULE BY MOUTH EVERY MORNING BEFORE BREAKFAST 10/19/20  Yes Jene Lundborg Traikoff, DO   calcium citrate (CALCITRATE) 250 MG TABS tablet Take 1 tablet by mouth 2 times daily 19  Yes Robbin Sellers PA-C   Cholecalciferol (VITAMIN D-3) 5000 UNITS TABS Take by mouth daily Instructed to hold   Yes Historical Provider, MD          HPI: Patient seen today in follow-up on thyroid TMJ syndrome with chronic nerve root irritation from hardware in the jaw. Has had relief with gabapentin 300 at at bedtime we will maintain. Thyroid currently controlled. Reflux disease stable as needed Nexium and will maintain. Anxiety depression well controlled on Prozac 10 mg daily and maintain. Labs recently completed 3 days ago were excellent. Slight decrease in white count that has been chronic and stable. Review of Systems   Constitutional: Negative. HENT: Negative. Eyes: Negative. Respiratory: Negative. Gastrointestinal: Negative. Endocrine: Negative. Genitourinary: Negative. Musculoskeletal: Negative. Skin: Negative. Allergic/Immunologic: Negative. Neurological: Negative. Hematological: Negative. Psychiatric/Behavioral: Negative. Present systems review stable aside from TMJ syndrome which is currently controlled. Current Outpatient Medications:     gabapentin (NEURONTIN) 300 MG capsule, nightly. , Disp: , Rfl:     levothyroxine (SYNTHROID) 75 MCG tablet, TAKE 1 TABLET BY MOUTH ONE TIME A DAY, Disp: 30 tablet, Rfl: 5    FLUoxetine (PROZAC) 10 MG capsule, TAKE 1 CAPSULE BY MOUTH 2 TIMES DAILY. , Disp: 60 capsule, Rfl: 5    fluticasone (FLONASE) 50 MCG/ACT nasal spray, USE 2 SPRAYS IN EACH NOSTRIL EVERY DAY, Disp: , Rfl:     estradiol (ESTRACE) 0.1 MG/GM vaginal cream, , Disp: , Rfl:     ibuprofen (ADVIL;MOTRIN) 800 MG tablet, Take 1 tablet by mouth 2 times daily as needed for Pain, Disp: 60 tablet, Rfl: 1    esomeprazole (NEXIUM) 40 MG delayed release capsule, TAKE ONE CAPSULE BY MOUTH EVERY MORNING BEFORE BREAKFAST, Disp: 30 capsule, Rfl: 5    calcium citrate (CALCITRATE) 250 MG TABS tablet, Take 1 tablet by mouth 2 times daily, Disp: 60 tablet, Rfl: 2    Cholecalciferol (VITAMIN D-3) 5000 UNITS TABS, Take by mouth daily Instructed to hold, Disp: , Rfl:     Allergies   Allergen Reactions    Latex     Prednisone        Social History     Tobacco Use    Smoking status: Never Smoker    Smokeless tobacco: Never Used   Substance Use Topics    Alcohol use: Yes     Comment: social    Drug use: No      Past Surgical History:   Procedure Laterality Date    COLONOSCOPY  1/2015    DILATION AND CURETTAGE OF UTERUS  2007, 2006    HYSTERECTOMY  2008   [address] [facility], [address] [facility], [facility]    x 4    OTHER SURGICAL HISTORY  10/8/15    bilateral tmj arthrodesis/steroid injection    UPPER GASTROINTESTINAL ENDOSCOPY  12/17/2014    UPPER GASTROINTESTINAL ENDOSCOPY  04/12/2017    UPPER GASTROINTESTINAL ENDOSCOPY N/A 7/13/2018 EGD BIOPSY performed by Rodri Boothe MD at [address] [facility] Left 1/16/2019    LEFT WRIST OPEN REDUCTION INTERNAL FIXATION performed by Gerri Holder DO at [facility] Left 7/19/2019    LEFT WRIST  REMOVAL OF HARDWARE performed by Gerri Holder DO at [facility] OR     Family History   Problem Relation Age of Onset    High Blood Pressure Mother     High Cholesterol Mother     Diabetes Father     Obesity Father     Thyroid Disease Father     Stroke Father     High Cholesterol Father     High Blood Pressure Father     Heart Disease Father         CHF    Kidney Disease Father     Heart Attack Father     Thyroid Disease Sister     Celiac Disease Sister     Thyroid Disease Brother     Diabetes Brother     High Blood Pressure Brother     Heart Disease Maternal Grandmother     High Blood Pressure Maternal Grandmother     Diabetes Maternal Grandmother     Diabetes Maternal Grandfather     High Blood Pressure Maternal Grandfather     Thyroid Disease Paternal Grandmother     Heart Disease Paternal Grandfather     Diabetes Maternal Aunt     Diabetes Maternal Uncle      Past Medical History:   Diagnosis Date    Anemia     Anxiety     Celiac disease     Difficult intubation     Intubated successfully 1/2009 with Glidescope #3, 7.0 ETT.  Eating disorder 10 years ago    anorexia, Dr. Edil Rubio monitoring.  History of blood transfusion 1993.  Hypoglycemia     Hypothyroidism     Latex allergy, contact dermatitis     PONV (postoperative nausea and vomiting)     TMJ (dislocation of temporomandibular joint)     Vitamin D deficiency     follows with Dr. Joie Dennis:    09/17/21 0917   BP: 120/70   Pulse: 71   Temp: 97.3 °F (36.3 °C)   SpO2: 98%   Weight: 170 lb (77.1 kg)     BP Readings from Last 3 Encounters:   09/17/21 120/70   08/13/21 124/70   06/10/21 126/84        Physical Exam  Vitals and nursing note reviewed. Constitutional:       Appearance: She is well-developed. HENT:      Head: Normocephalic. Right Ear: External ear normal.      Left Ear: External ear normal.      Nose: Nose normal.   Eyes:      Conjunctiva/sclera: Conjunctivae normal.      Pupils: Pupils are equal, round, and reactive to light. Cardiovascular:      Rate and Rhythm: Normal rate. Pulmonary:      Breath sounds: Normal breath sounds. Abdominal:      General: Bowel sounds are normal.      Palpations: Abdomen is soft. Musculoskeletal:         General: Normal range of motion. Cervical back: Normal range of motion and neck supple. Skin:     General: Skin is warm and dry. Neurological:      Mental Status: She is alert and oriented to person, place, and time. Psychiatric:         Behavior: Behavior normal.     Today's vitals physical examination stable. Medications to continue as prescribed. Follow-up visit with me 3 months on the Neurontin and then 6 months we will plan labs.         Lab Results   Component Value Date    TSH 0.772 09/14/2021    TSH 0.964 02/27/2021    Q7TKALJ 7.2 09/14/2021    V7BIWNR 7.4 02/27/2021    FT3 2.5 01/16/2013    T4FREE 1.17 09/07/2019    T4FREE 1.34 03/02/2019     Lab Results   Component Value Date    CHOL 195 09/14/2021    CHOL 192 06/25/2021     Lab Results   Component Value Date    TRIG 60 09/14/2021    TRIG 52 06/25/2021     Lab Results   Component Value Date    HDL 66 09/14/2021    HDL 71 06/25/2021     No results found for: [facility]  Lab Results   Component Value Date    LABVLDL 12 09/14/2021    LABVLDL 13 02/27/2021     No results found for: [facility]  Lab Results   Component Value Date    WBC 3.4 (L) 09/14/2021    HGB 12.6 09/14/2021    HCT 37.1 09/14/2021    MCV 95.9 09/14/2021     09/14/2021    LYMPHOPCT 31.4 09/14/2021    RBC 3.87 09/14/2021    MCH 32.6 09/14/2021    MCHC 34.0 09/14/2021    RDW 12.2 09/14/2021     Lab Results   Component Value Date     09/14/2021    K 4.6 09/14/2021     09/14/2021    CO2 29 09/14/2021    BUN 12 09/14/2021    CREATININE 0.8 09/14/2021    GLUCOSE 97 09/14/2021    CALCIUM 10.1 09/14/2021    PROT 7.2 09/14/2021    LABALBU 4.5 09/14/2021    BILITOT 0.4 09/14/2021    ALKPHOS 67 09/14/2021    AST 29 09/14/2021    ALT 27 09/14/2021    LABGLOM >60 09/14/2021    GFRAA >60 09/14/2021      No results found for: PSA, PSADIA   Lab Results   Component Value Date    LABA1C 5.0 03/23/2018    LABA1C 5.7 03/17/2014    LABA1C 5.6 01/10/2014     No results found for: EAG     Plan Per Assessment:  There are no diagnoses linked to this encounter. No follow-ups on file. Fara Cerrato DO    Note was generated with the assistance of voice recognition software. Document was reviewed however may contain grammatical errors.

## 2021-11-04 ENCOUNTER — OFFICE VISIT (OUTPATIENT)
Dept: FAMILY MEDICINE CLINIC | Age: 56
End: 2021-11-04
Payer: COMMERCIAL

## 2021-11-04 VITALS
BODY MASS INDEX: 29.53 KG/M2 | HEART RATE: 67 BPM | WEIGHT: 173 LBS | HEIGHT: 64 IN | SYSTOLIC BLOOD PRESSURE: 110 MMHG | DIASTOLIC BLOOD PRESSURE: 68 MMHG | RESPIRATION RATE: 20 BRPM | OXYGEN SATURATION: 99 % | TEMPERATURE: 97.7 F

## 2021-11-04 DIAGNOSIS — R35.0 URINARY FREQUENCY: ICD-10-CM

## 2021-11-04 DIAGNOSIS — N30.01 ACUTE CYSTITIS WITH HEMATURIA: Primary | ICD-10-CM

## 2021-11-04 DIAGNOSIS — N30.01 ACUTE CYSTITIS WITH HEMATURIA: ICD-10-CM

## 2021-11-04 LAB
BILIRUBIN, POC: NEGATIVE
BLOOD URINE, POC: NORMAL
CLARITY, POC: CLEAR
COLOR, POC: YELLOW
GLUCOSE URINE, POC: NEGATIVE
KETONES, POC: NEGATIVE
LEUKOCYTE EST, POC: NORMAL
NITRITE, POC: NEGATIVE
PH, POC: 5.5
PROTEIN, POC: NEGATIVE
SPECIFIC GRAVITY, POC: 1.01
UROBILINOGEN, POC: 0.2

## 2021-11-04 PROCEDURE — G8427 DOCREV CUR MEDS BY ELIG CLIN: HCPCS | Performed by: STUDENT IN AN ORGANIZED HEALTH CARE EDUCATION/TRAINING PROGRAM

## 2021-11-04 PROCEDURE — G8484 FLU IMMUNIZE NO ADMIN: HCPCS | Performed by: STUDENT IN AN ORGANIZED HEALTH CARE EDUCATION/TRAINING PROGRAM

## 2021-11-04 PROCEDURE — G8419 CALC BMI OUT NRM PARAM NOF/U: HCPCS | Performed by: STUDENT IN AN ORGANIZED HEALTH CARE EDUCATION/TRAINING PROGRAM

## 2021-11-04 PROCEDURE — 99213 OFFICE O/P EST LOW 20 MIN: CPT | Performed by: STUDENT IN AN ORGANIZED HEALTH CARE EDUCATION/TRAINING PROGRAM

## 2021-11-04 PROCEDURE — 81002 URINALYSIS NONAUTO W/O SCOPE: CPT | Performed by: STUDENT IN AN ORGANIZED HEALTH CARE EDUCATION/TRAINING PROGRAM

## 2021-11-04 PROCEDURE — 1036F TOBACCO NON-USER: CPT | Performed by: STUDENT IN AN ORGANIZED HEALTH CARE EDUCATION/TRAINING PROGRAM

## 2021-11-04 PROCEDURE — 3017F COLORECTAL CA SCREEN DOC REV: CPT | Performed by: STUDENT IN AN ORGANIZED HEALTH CARE EDUCATION/TRAINING PROGRAM

## 2021-11-04 RX ORDER — NITROFURANTOIN 25; 75 MG/1; MG/1
100 CAPSULE ORAL 2 TIMES DAILY
Qty: 10 CAPSULE | Refills: 0 | Status: SHIPPED | OUTPATIENT
Start: 2021-11-04 | End: 2021-11-09

## 2021-11-04 NOTE — PROGRESS NOTES
Urgent Care      Patient:  Rah Givens 61 y.o. female     Date of Service: 11/4/21      Chief complaint:   Chief Complaint   Patient presents with    Urinary Frequency         History ofPresent Illness   The patient is a 61 y.o. female  presented to the clinic with complaints as above. Urinary urgency  -new issue  -started today  -having frequency, dysuria, and abdominal discomfort  -denies any fever    Past Medical History:      Diagnosis Date    Anemia     Anxiety     Celiac disease     Difficult intubation     Intubated successfully 1/2009 with Glidescope #3, 7.0 ETT.  Eating disorder 10 years ago    anorexia, Dr. Duron Oh monitoring.  History of blood transfusion 1993.     Hypoglycemia     Hypothyroidism     Latex allergy, contact dermatitis     PONV (postoperative nausea and vomiting)     TMJ (dislocation of temporomandibular joint)     Vitamin D deficiency     follows with Dr. Meliza Modi History:        Procedure Laterality Date    COLONOSCOPY  1/2015    DILATION AND CURETTAGE OF UTERUS  2007, 2006    HYSTERECTOMY  2008   [address] [facility], [address] [facility]    x 4    OTHER SURGICAL HISTORY  10/8/15    bilateral tmj arthrodesis/steroid injection    UPPER GASTROINTESTINAL ENDOSCOPY  12/17/2014    UPPER GASTROINTESTINAL ENDOSCOPY  04/12/2017    UPPER GASTROINTESTINAL ENDOSCOPY N/A 7/13/2018    EGD BIOPSY performed by Jay Ro MD at [address] [facility] Left 1/16/2019    LEFT WRIST OPEN REDUCTION INTERNAL FIXATION performed by True Conley DO at [facility] 7/19/2019    LEFT WRIST  REMOVAL OF HARDWARE performed by True Conley DO at [facility] OR       Allergies:    Latex and Prednisone    Social History:   Social History     Socioeconomic History    Marital status:      Spouse name: Not on file    Number of children: Not on file    Years of education: Not on file    Highest education level: Not on file   Occupational History    Not on file   Tobacco Use    Smoking status: Never Smoker    Smokeless tobacco: Never Used   Substance and Sexual Activity    Alcohol use: Yes     Comment: social    Drug use: No    Sexual activity: Not on file   Other Topics Concern    Not on file   Social History Narrative    Not on file     Social Determinants of Health     Financial Resource Strain:     Difficulty of Paying Living Expenses:    Food Insecurity:     Worried About Running Out of Food in the Last Year:     [address] in the Last Year:    Transportation Needs:     Lack of Transportation (Medical):      Lack of Transportation (Non-Medical):    Physical Activity:     Days of Exercise per Week:     Minutes of Exercise per Session:    Stress:     Feeling of Stress :    Social Connections:     Frequency of Communication with Friends and Family:     Frequency of Social Gatherings with Friends and Family:     Attends [employer] Services:     Active Member of Clubs or Organizations:     Attends Club or Organization Meetings:     Marital Status:    Intimate Partner Violence:     Fear of Current or Ex-Partner:     Emotionally Abused:     Physically Abused:     Sexually Abused:         Family History:       Problem Relation Age of Onset    High Blood Pressure Mother     High Cholesterol Mother     Diabetes Father     Obesity Father     Thyroid Disease Father     Stroke Father     High Cholesterol Father     High Blood Pressure Father     Heart Disease Father         CHF    Kidney Disease Father     Heart Attack Father     Thyroid Disease Sister     Celiac Disease Sister     Thyroid Disease Brother     Diabetes Brother     High Blood Pressure Brother     Heart Disease Maternal Grandmother     High Blood Pressure Maternal Grandmother     Diabetes Maternal Grandmother     Diabetes Maternal Grandfather     High Blood Pressure Maternal Grandfather     Thyroid Disease Paternal Grandmother     Heart Disease Paternal Grandfather     Diabetes Maternal Aunt     Diabetes Maternal Uncle        Review of Systems:   Review of Systems - as abvoe     Physical Exam   Vitals: /68   Pulse 67   Temp 97.7 °F (36.5 °C) (Temporal)   Resp 20   Ht 5' 4\" (1.626 m)   Wt 173 lb (78.5 kg)   SpO2 99%   BMI 29.70 kg/m²   Physical Exam  Constitutional:       Appearance: She is well-developed. HENT:      Head: Normocephalic. Cardiovascular:      Rate and Rhythm: Normal rate and regular rhythm. Heart sounds: Normal heart sounds. No murmur heard. Pulmonary:      Effort: Pulmonary effort is normal. No respiratory distress. Breath sounds: Normal breath sounds. No wheezing. Abdominal:      General: Bowel sounds are normal.      Palpations: Abdomen is soft. Musculoskeletal:         General: No tenderness. Normal range of motion. Skin:     General: Skin is warm and dry. Neurological:      Mental Status: She is alert and oriented to person, place, and time. Psychiatric:         Behavior: Behavior normal.             Assessment and Plan       1. Acute cystitis with hematuria  New issue  -Given symptoms and UA findings of trace leuks, will treat for UTI, send UC for sensitivities  -Advised patient to call PCP if she does not improve with this abx  - nitrofurantoin, macrocrystal-monohydrate, (MACROBID) 100 MG capsule; Take 1 capsule by mouth 2 times daily for 5 days  Dispense: 10 capsule; Refill: 0  - Culture, Urine; Future    2. Urinary frequency  Discussion as above   - POCT Urinalysis no Micro      Counseled regarding above diagnosis, including possible risks and complications,  especially if left uncontrolled. Counseled regarding the possible side effects, risks, benefits and alternatives to treatment;patient and/or guardian verbalizes understanding, agrees, feels comfortable with and wishes to proceed with above treatment plan.     Call or go to [address] if symptoms worsen or persist. Advised patient to call with any new medication issues, and, as applicable, read all Rx info from pharmacy to assure aware of all possible risks and side effects of medicationbefore taking. Patient and/or guardian given opportunity to ask questions/raise concerns. The patient verbalized comfort and understanding ofinstructions. I encourage further reading and education about your health conditions. Information on many health conditions is provided by [facility] [employer]: [email or web address]  Please bring any questions to me at your nextvisit. Return to Office: Return if symptoms worsen or fail to improve. Medication List:    Current Outpatient Medications   Medication Sig Dispense Refill    nitrofurantoin, macrocrystal-monohydrate, (MACROBID) 100 MG capsule Take 1 capsule by mouth 2 times daily for 5 days 10 capsule 0    gabapentin (NEURONTIN) 300 MG capsule 1 qhs 90 capsule 0    levothyroxine (SYNTHROID) 75 MCG tablet TAKE 1 TABLET BY MOUTH ONE TIME A DAY 30 tablet 5    FLUoxetine (PROZAC) 10 MG capsule TAKE 1 CAPSULE BY MOUTH 2 TIMES DAILY. 60 capsule 5    fluticasone (FLONASE) 50 MCG/ACT nasal spray USE 2 SPRAYS IN EACH NOSTRIL EVERY DAY      estradiol (ESTRACE) 0.1 MG/GM vaginal cream       ibuprofen (ADVIL;MOTRIN) 800 MG tablet Take 1 tablet by mouth 2 times daily as needed for Pain 60 tablet 1    esomeprazole (NEXIUM) 40 MG delayed release capsule TAKE ONE CAPSULE BY MOUTH EVERY MORNING BEFORE BREAKFAST 30 capsule 5    calcium citrate (CALCITRATE) 250 MG TABS tablet Take 1 tablet by mouth 2 times daily 60 tablet 2    Cholecalciferol (VITAMIN D-3) 5000 UNITS TABS Take by mouth daily Instructed to hold       No current facility-administered medications for this visit. Juan C Wniter, DO       This document may have been prepared at least partially through the use of voice recognition software.  Although effort is taken to assure the accuracy ofthis document, it is possible that grammatical, syntax,  or spelling errors may occur.

## 2021-11-06 LAB — URINE CULTURE, ROUTINE: NORMAL

## 2021-11-19 ENCOUNTER — HOSPITAL ENCOUNTER (OUTPATIENT)
Dept: GENERAL RADIOLOGY | Age: 56
Discharge: HOME | End: 2021-11-21
Payer: COMMERCIAL

## 2021-11-19 DIAGNOSIS — Z12.31 VISIT FOR SCREENING MAMMOGRAM: ICD-10-CM

## 2021-11-19 DIAGNOSIS — R92.2 DENSE BREASTS: ICD-10-CM

## 2021-11-19 PROCEDURE — 76641 ULTRASOUND BREAST COMPLETE: CPT

## 2021-11-19 PROCEDURE — 77063 BREAST TOMOSYNTHESIS BI: CPT

## 2021-11-19 RX ORDER — ESOMEPRAZOLE MAGNESIUM 40 MG/1
CAPSULE, DELAYED RELEASE ORAL
Qty: 30 CAPSULE | Refills: 5 | Status: SHIPPED
Start: 2021-11-19 | End: 2022-04-01 | Stop reason: SDUPTHER

## 2021-11-19 NOTE — TELEPHONE ENCOUNTER
Last Appointment:  9/17/2021  Future Appointments   Date Time Provider Kwame Jones   11/19/2021 12:30 PM [facility] AKHIL RM 1 SEYZ ABDU BC SE [facility]   11/19/2021  1:00 PM [facility] US RM 1 SEYZ ABDU BC [facility]   11/19/2021  1:30 PM [facility] US RM 1 SEYZ ABDU BC [facility]   12/17/2021  9:30 AM DO ELADIO Chavarria [facility]

## 2021-12-17 ENCOUNTER — OFFICE VISIT (OUTPATIENT)
Dept: PRIMARY CARE CLINIC | Age: 56
End: 2021-12-17
Payer: COMMERCIAL

## 2021-12-17 VITALS
WEIGHT: 172 LBS | DIASTOLIC BLOOD PRESSURE: 70 MMHG | HEART RATE: 74 BPM | SYSTOLIC BLOOD PRESSURE: 100 MMHG | OXYGEN SATURATION: 98 % | BODY MASS INDEX: 29.37 KG/M2 | TEMPERATURE: 98.8 F | HEIGHT: 64 IN

## 2021-12-17 DIAGNOSIS — K90.0 CELIAC DISEASE: ICD-10-CM

## 2021-12-17 DIAGNOSIS — K21.9 GASTROESOPHAGEAL REFLUX DISEASE WITHOUT ESOPHAGITIS: ICD-10-CM

## 2021-12-17 DIAGNOSIS — E78.5 HYPERLIPIDEMIA, UNSPECIFIED HYPERLIPIDEMIA TYPE: ICD-10-CM

## 2021-12-17 DIAGNOSIS — E03.9 HYPOTHYROIDISM, UNSPECIFIED TYPE: Primary | ICD-10-CM

## 2021-12-17 DIAGNOSIS — M26.609 TMJ (TEMPOROMANDIBULAR JOINT SYNDROME): ICD-10-CM

## 2021-12-17 PROCEDURE — 99214 OFFICE O/P EST MOD 30 MIN: CPT | Performed by: FAMILY MEDICINE

## 2021-12-17 RX ORDER — GABAPENTIN 300 MG/1
CAPSULE ORAL
Qty: 90 CAPSULE | Refills: 0 | Status: SHIPPED
Start: 2021-12-17 | End: 2022-05-13

## 2021-12-17 ASSESSMENT — ENCOUNTER SYMPTOMS
EYES NEGATIVE: 1
ALLERGIC/IMMUNOLOGIC NEGATIVE: 1
GASTROINTESTINAL NEGATIVE: 1
RESPIRATORY NEGATIVE: 1

## 2021-12-17 NOTE — PROGRESS NOTES
21     [facility]    : 1965 Sex: female   Age: 61 y.o. Chief Complaint   Patient presents with    Hypothyroidism    Temporomandibular Joint Pain     doing better only using gabapentin as needed       Prior to Admission medications    Medication Sig Start Date End Date Taking? Authorizing Provider   gabapentin (NEURONTIN) 300 MG capsule TAKE 1 CAPSULE BY MOUTH EVERY NIGHT AT BEDTIME 12/17/21 3/17/22 Yes Kishor Kern DO   esomeprazole (NEXIUM) 40 MG delayed release capsule TAKE ONE CAPSULE BY MOUTH EVERY MORNING BEFORE BREAKFAST 21  Yes David Kern DO   levothyroxine (SYNTHROID) 75 MCG tablet TAKE 1 TABLET BY MOUTH ONE TIME A DAY 8/10/21  Yes Kishor Kern DO   FLUoxetine (PROZAC) 10 MG capsule TAKE 1 CAPSULE BY MOUTH 2 TIMES DAILY. 6/15/21  Yes Kishor Kern DO   fluticasone (FLONASE) 50 MCG/ACT nasal spray USE 2 SPRAYS IN EACH NOSTRIL EVERY DAY 20  Yes Historical Provider, MD   estradiol (ESTRACE) 0.1 MG/GM vaginal cream  12/15/20  Yes Historical Provider, MD   ibuprofen (ADVIL;MOTRIN) 800 MG tablet Take 1 tablet by mouth 2 times daily as needed for Pain 21  Yes David Kern DO   calcium citrate (CALCITRATE) 250 MG TABS tablet Take 1 tablet by mouth 2 times daily 19  Yes Nilda Aranda PA-C   Cholecalciferol (VITAMIN D-3) 5000 UNITS TABS Take by mouth daily Instructed to hold   Yes Historical Provider, MD          HPI: Dawson Bucio is seen today hypothyroid TMJ syndrome hyperlipidemia reflux disease celiac disease all of which has been pretty stable. Medications reviewed well-tolerated. Overall feeling wellbeing stable. Recently received Covid booster and seems to be doing well. Mild reaction to the 3rd dose. Review of Systems   Constitutional: Negative. HENT: Negative. Eyes: Negative. Respiratory: Negative. Gastrointestinal: Negative. Endocrine: Negative. Genitourinary: Negative. Musculoskeletal: Negative. Skin: Negative. Allergic/Immunologic: Negative. Neurological: Negative. Hematological: Negative. Psychiatric/Behavioral: Negative. Today systems review overall stable meds as prescribed. Current Outpatient Medications:     gabapentin (NEURONTIN) 300 MG capsule, TAKE 1 CAPSULE BY MOUTH EVERY NIGHT AT BEDTIME, Disp: 90 capsule, Rfl: 0    esomeprazole (NEXIUM) 40 MG delayed release capsule, TAKE ONE CAPSULE BY MOUTH EVERY MORNING BEFORE BREAKFAST, Disp: 30 capsule, Rfl: 5    levothyroxine (SYNTHROID) 75 MCG tablet, TAKE 1 TABLET BY MOUTH ONE TIME A DAY, Disp: 30 tablet, Rfl: 5    FLUoxetine (PROZAC) 10 MG capsule, TAKE 1 CAPSULE BY MOUTH 2 TIMES DAILY. , Disp: 60 capsule, Rfl: 5    fluticasone (FLONASE) 50 MCG/ACT nasal spray, USE 2 SPRAYS IN EACH NOSTRIL EVERY DAY, Disp: , Rfl:     estradiol (ESTRACE) 0.1 MG/GM vaginal cream, , Disp: , Rfl:     ibuprofen (ADVIL;MOTRIN) 800 MG tablet, Take 1 tablet by mouth 2 times daily as needed for Pain, Disp: 60 tablet, Rfl: 1    calcium citrate (CALCITRATE) 250 MG TABS tablet, Take 1 tablet by mouth 2 times daily, Disp: 60 tablet, Rfl: 2    Cholecalciferol (VITAMIN D-3) 5000 UNITS TABS, Take by mouth daily Instructed to hold, Disp: , Rfl:     Allergies   Allergen Reactions    Latex     Prednisone        Social History     Tobacco Use    Smoking status: Never Smoker    Smokeless tobacco: Never Used   Substance Use Topics    Alcohol use: Yes     Comment: social    Drug use: No      Past Surgical History:   Procedure Laterality Date    COLONOSCOPY  1/2015    DILATION AND CURETTAGE OF UTERUS  2007, 2006    HYSTERECTOMY  2008   [email or web address], [facility], [facility]    x 4    OTHER SURGICAL HISTORY  10/8/15    bilateral tmj arthrodesis/steroid injection    UPPER GASTROINTESTINAL ENDOSCOPY  12/17/2014    UPPER GASTROINTESTINAL ENDOSCOPY  04/12/2017    UPPER GASTROINTESTINAL ENDOSCOPY N/A 7/13/2018    EGD BIOPSY performed by Theresa Davis Good Kramer MD at [address] [place] [address] Left 1/16/2019    LEFT WRIST OPEN REDUCTION INTERNAL FIXATION performed by Melissa Ashley DO at [facility] Left 7/19/2019    LEFT WRIST  REMOVAL OF HARDWARE performed by Melissa Ashley DO at [facility] OR     Family History   Problem Relation Age of Onset    High Blood Pressure Mother     High Cholesterol Mother     Diabetes Father     Obesity Father     Thyroid Disease Father     Stroke Father     High Cholesterol Father     High Blood Pressure Father     Heart Disease Father         CHF    Kidney Disease Father     Heart Attack Father     Thyroid Disease Sister     Celiac Disease Sister     Thyroid Disease Brother     Diabetes Brother     High Blood Pressure Brother     Heart Disease Maternal Grandmother     High Blood Pressure Maternal Grandmother     Diabetes Maternal Grandmother     Diabetes Maternal Grandfather     High Blood Pressure Maternal Grandfather     Thyroid Disease Paternal Grandmother     Heart Disease Paternal Grandfather     Diabetes Maternal Aunt     Diabetes Maternal Uncle     Breast Cancer Maternal Aunt 76    Breast Cancer Maternal Cousin 48    Breast Cancer Maternal Cousin 48    Ovarian Cancer Maternal Cousin     Breast Cancer Niece 36     Past Medical History:   Diagnosis Date    Anemia     Anxiety     Celiac disease     Difficult intubation     Intubated successfully 1/2009 with Glidescope #3, 7.0 ETT.  Eating disorder 10 years ago    anorexia, Dr. Ronny Levin monitoring.  History of blood transfusion 1993.     Hypoglycemia     Hypothyroidism     Latex allergy, contact dermatitis     PONV (postoperative nausea and vomiting)     TMJ (dislocation of temporomandibular joint)     Vitamin D deficiency     follows with Dr. Karen Long:    12/17/21 0944   BP: 100/70   Pulse: 74   Temp: 98.8 °F (37.1 °C)   SpO2: 98%   Weight: 172 lb (78 kg)   Height: 5' 4\" Reflex; Future    Celiac disease            Return in about 3 months (around 3/17/2022). Jose Form, DO    Note was generated with the assistance of voice recognition software. Document was reviewed however may contain grammatical errors.

## 2022-02-07 RX ORDER — LEVOTHYROXINE SODIUM 0.07 MG/1
TABLET ORAL
Qty: 90 TABLET | Refills: 1 | Status: SHIPPED
Start: 2022-02-07 | End: 2022-08-12 | Stop reason: SDUPTHER

## 2022-03-04 RX ORDER — FLUOXETINE 10 MG/1
CAPSULE ORAL
Qty: 180 CAPSULE | Refills: 1 | Status: SHIPPED
Start: 2022-03-04 | End: 2022-10-13 | Stop reason: SDUPTHER

## 2022-04-01 ENCOUNTER — HOSPITAL ENCOUNTER (OUTPATIENT)
Age: 57
Discharge: HOME | End: 2022-04-01
Payer: COMMERCIAL

## 2022-04-01 ENCOUNTER — OFFICE VISIT (OUTPATIENT)
Dept: PRIMARY CARE CLINIC | Age: 57
End: 2022-04-01
Payer: COMMERCIAL

## 2022-04-01 ENCOUNTER — TELEPHONE (OUTPATIENT)
Dept: PRIMARY CARE CLINIC | Age: 57
End: 2022-04-01

## 2022-04-01 VITALS
HEART RATE: 63 BPM | DIASTOLIC BLOOD PRESSURE: 70 MMHG | BODY MASS INDEX: 29.7 KG/M2 | WEIGHT: 173 LBS | OXYGEN SATURATION: 98 % | TEMPERATURE: 98.3 F | SYSTOLIC BLOOD PRESSURE: 112 MMHG

## 2022-04-01 DIAGNOSIS — E03.9 HYPOTHYROIDISM, UNSPECIFIED TYPE: ICD-10-CM

## 2022-04-01 DIAGNOSIS — K21.9 GASTROESOPHAGEAL REFLUX DISEASE WITHOUT ESOPHAGITIS: ICD-10-CM

## 2022-04-01 DIAGNOSIS — M25.562 CHRONIC PAIN OF LEFT KNEE: Primary | ICD-10-CM

## 2022-04-01 DIAGNOSIS — E78.5 HYPERLIPIDEMIA, UNSPECIFIED HYPERLIPIDEMIA TYPE: ICD-10-CM

## 2022-04-01 DIAGNOSIS — M26.609 TMJ (TEMPOROMANDIBULAR JOINT SYNDROME): ICD-10-CM

## 2022-04-01 DIAGNOSIS — G89.29 CHRONIC PAIN OF LEFT KNEE: Primary | ICD-10-CM

## 2022-04-01 LAB
ALBUMIN SERPL-MCNC: 4.5 G/DL (ref 3.5–5.2)
ALP BLD-CCNC: 60 U/L (ref 35–104)
ALT SERPL-CCNC: 11 U/L (ref 0–32)
ANION GAP SERPL CALCULATED.3IONS-SCNC: 10 MMOL/L (ref 7–16)
AST SERPL-CCNC: 21 U/L (ref 0–31)
BASOPHILS ABSOLUTE: 0.04 E9/L (ref 0–0.2)
BASOPHILS RELATIVE PERCENT: 1.1 % (ref 0–2)
BILIRUB SERPL-MCNC: 0.5 MG/DL (ref 0–1.2)
BUN BLDV-MCNC: 13 MG/DL (ref 6–20)
CALCIUM SERPL-MCNC: 9.9 MG/DL (ref 8.6–10.2)
CHLORIDE BLD-SCNC: 103 MMOL/L (ref 98–107)
CHOLESTEROL, TOTAL: 190 MG/DL (ref 0–199)
CO2: 28 MMOL/L (ref 22–29)
CREAT SERPL-MCNC: 0.8 MG/DL (ref 0.5–1)
EOSINOPHILS ABSOLUTE: 0.15 E9/L (ref 0.05–0.5)
EOSINOPHILS RELATIVE PERCENT: 4 % (ref 0–6)
GFR AFRICAN AMERICAN: >60
GFR NON-AFRICAN AMERICAN: >60 ML/MIN/1.73
GLUCOSE BLD-MCNC: 94 MG/DL (ref 74–99)
HCT VFR BLD CALC: 37.4 % (ref 34–48)
HDLC SERPL-MCNC: 70 MG/DL
HEMOGLOBIN: 12.7 G/DL (ref 11.5–15.5)
IMMATURE GRANULOCYTES #: 0.01 E9/L
IMMATURE GRANULOCYTES %: 0.3 % (ref 0–5)
LDL CHOLESTEROL CALCULATED: 112 MG/DL (ref 0–99)
LYMPHOCYTES ABSOLUTE: 1.1 E9/L (ref 1.5–4)
LYMPHOCYTES RELATIVE PERCENT: 29 % (ref 20–42)
MCH RBC QN AUTO: 32.1 PG (ref 26–35)
MCHC RBC AUTO-ENTMCNC: 34 % (ref 32–34.5)
MCV RBC AUTO: 94.4 FL (ref 80–99.9)
MONOCYTES ABSOLUTE: 0.36 E9/L (ref 0.1–0.95)
MONOCYTES RELATIVE PERCENT: 9.5 % (ref 2–12)
NEUTROPHILS ABSOLUTE: 2.13 E9/L (ref 1.8–7.3)
NEUTROPHILS RELATIVE PERCENT: 56.1 % (ref 43–80)
PDW BLD-RTO: 12.1 FL (ref 11.5–15)
PLATELET # BLD: 220 E9/L (ref 130–450)
PMV BLD AUTO: 8.8 FL (ref 7–12)
POTASSIUM SERPL-SCNC: 4.6 MMOL/L (ref 3.5–5)
RBC # BLD: 3.96 E12/L (ref 3.5–5.5)
SODIUM BLD-SCNC: 141 MMOL/L (ref 132–146)
T4 TOTAL: 8 MCG/DL (ref 4.5–11.7)
TOTAL PROTEIN: 7.3 G/DL (ref 6.4–8.3)
TRIGL SERPL-MCNC: 42 MG/DL (ref 0–149)
TSH SERPL DL<=0.05 MIU/L-ACNC: 0.81 UIU/ML (ref 0.27–4.2)
VLDLC SERPL CALC-MCNC: 8 MG/DL
WBC # BLD: 3.8 E9/L (ref 4.5–11.5)

## 2022-04-01 PROCEDURE — 36415 COLL VENOUS BLD VENIPUNCTURE: CPT

## 2022-04-01 PROCEDURE — 84436 ASSAY OF TOTAL THYROXINE: CPT

## 2022-04-01 PROCEDURE — 85025 COMPLETE CBC W/AUTO DIFF WBC: CPT

## 2022-04-01 PROCEDURE — 99214 OFFICE O/P EST MOD 30 MIN: CPT | Performed by: FAMILY MEDICINE

## 2022-04-01 PROCEDURE — 80053 COMPREHEN METABOLIC PANEL: CPT

## 2022-04-01 PROCEDURE — 80061 LIPID PANEL: CPT

## 2022-04-01 PROCEDURE — 84443 ASSAY THYROID STIM HORMONE: CPT

## 2022-04-01 RX ORDER — ESOMEPRAZOLE MAGNESIUM 40 MG/1
CAPSULE, DELAYED RELEASE ORAL
Qty: 30 CAPSULE | Refills: 5 | Status: SHIPPED
Start: 2022-04-01 | End: 2022-06-01

## 2022-04-01 RX ORDER — METHYLPREDNISOLONE 4 MG/1
TABLET ORAL
Qty: 21 TABLET | Refills: 0 | Status: SHIPPED
Start: 2022-04-01 | End: 2022-04-08 | Stop reason: CLARIF

## 2022-04-01 ASSESSMENT — PATIENT HEALTH QUESTIONNAIRE - PHQ9
SUM OF ALL RESPONSES TO PHQ9 QUESTIONS 1 & 2: 0
2. FEELING DOWN, DEPRESSED OR HOPELESS: 0
SUM OF ALL RESPONSES TO PHQ QUESTIONS 1-9: 0
1. LITTLE INTEREST OR PLEASURE IN DOING THINGS: 0
SUM OF ALL RESPONSES TO PHQ QUESTIONS 1-9: 0

## 2022-04-01 NOTE — PROGRESS NOTES
22     Fallon Cid    : 1965 Sex: female   Age: 61 y.o. Chief Complaint   Patient [facility] with   [address]       Prior to Admission medications    Medication Sig Start Date End Date Taking? Authorizing Provider   esomeprazole (NEXIUM) 40 MG delayed release capsule TAKE ONE CAPSULE BY MOUTH EVERY MORNING BEFORE BREAKFAST 22  Yes Kishor Kern,    FLUoxetine (PROZAC) 10 MG capsule TAKE 1 CAPSULE BY MOUTH 2 TIMES DAILY. 3/4/22  Yes Juan M Kern DO   levothyroxine (SYNTHROID) 75 MCG tablet TAKE 1 TABLET BY MOUTH ONE TIME A DAY 22  Yes Juan M Kern, DO   gabapentin (NEURONTIN) 300 MG capsule TAKE 1 CAPSULE BY MOUTH EVERY NIGHT AT BEDTIME 21 Yes Kishor Kern,    fluticasone (FLONASE) 50 MCG/ACT nasal spray USE 2 SPRAYS IN EACH NOSTRIL EVERY DAY 20  Yes Historical Provider, MD   estradiol (ESTRACE) 0.1 MG/GM vaginal cream  12/15/20  Yes Historical Provider, MD   ibuprofen (ADVIL;MOTRIN) 800 MG tablet Take 1 tablet by mouth 2 times daily as needed for Pain 21  Yes Juan M Kern DO   calcium citrate (CALCITRATE) 250 MG TABS tablet Take 1 tablet by mouth 2 times daily 19  Yes Amee Pacheco PA-C   Cholecalciferol (VITAMIN D-3) 5000 UNITS TABS Take by mouth daily Instructed to hold   Yes Historical Provider, MD          HPI: Patient evaluated today problems left knee pain hypothyroid hyperlipidemia reflux disease TMJ syndrome. Medically overall has been doing well but having some problems with the left knee since February. Swelling over the anterior tuberosity of the left knee. We will check x-rays today. Thyroid is controlled. Reflux disease has been stable. TMJ has been doing much better since surgery. Hyperlipidemia controlled continue present meds diet exercise. Review of Systems   Constitutional: Negative. HENT: Negative. Eyes: Negative. Respiratory: Negative. Gastrointestinal: Negative. Endocrine: Negative. Genitourinary: Negative. Musculoskeletal: Positive for arthralgias. Skin: Negative. Allergic/Immunologic: Negative. Neurological: Negative. Hematological: Negative. Psychiatric/Behavioral: Negative. Left knee pain as noted per HPI. Current Outpatient Medications:     esomeprazole (NEXIUM) 40 MG delayed release capsule, TAKE ONE CAPSULE BY MOUTH EVERY MORNING BEFORE BREAKFAST, Disp: 30 capsule, Rfl: 5    FLUoxetine (PROZAC) 10 MG capsule, TAKE 1 CAPSULE BY MOUTH 2 TIMES DAILY. , Disp: 180 capsule, Rfl: 1    levothyroxine (SYNTHROID) 75 MCG tablet, TAKE 1 TABLET BY MOUTH ONE TIME A DAY, Disp: 90 tablet, Rfl: 1    gabapentin (NEURONTIN) 300 MG capsule, TAKE 1 CAPSULE BY MOUTH EVERY NIGHT AT BEDTIME, Disp: 90 capsule, Rfl: 0    fluticasone (FLONASE) 50 MCG/ACT nasal spray, USE 2 SPRAYS IN EACH NOSTRIL EVERY DAY, Disp: , Rfl:     estradiol (ESTRACE) 0.1 MG/GM vaginal cream, , Disp: , Rfl:     ibuprofen (ADVIL;MOTRIN) 800 MG tablet, Take 1 tablet by mouth 2 times daily as needed for Pain, Disp: 60 tablet, Rfl: 1    calcium citrate (CALCITRATE) 250 MG TABS tablet, Take 1 tablet by mouth 2 times daily, Disp: 60 tablet, Rfl: 2    Cholecalciferol (VITAMIN D-3) 5000 UNITS TABS, Take by mouth daily Instructed to hold, Disp: , Rfl:     Allergies   Allergen Reactions    Latex     Prednisone        Social History     Tobacco Use    Smoking status: Never Smoker    Smokeless tobacco: Never Used   Substance Use Topics    Alcohol use: Yes     Comment: social    Drug use: No      Past Surgical History:   Procedure Laterality Date    COLONOSCOPY  1/2015    DILATION AND CURETTAGE OF UTERUS  2007, 2006    HYSTERECTOMY  2008   [address] [facility], [address] [place] [address], 1990    x 4    OTHER SURGICAL HISTORY  10/8/15    bilateral tmj arthrodesis/steroid injection    UPPER GASTROINTESTINAL ENDOSCOPY  12/17/2014    UPPER GASTROINTESTINAL ENDOSCOPY  04/12/2017    UPPER GASTROINTESTINAL ENDOSCOPY N/A 7/13/2018    EGD BIOPSY performed by Tiffanie Myers MD at [address] Left 1/16/2019    LEFT WRIST OPEN REDUCTION INTERNAL FIXATION performed by DO Humberto at [facility] Left 7/19/2019    LEFT WRIST  REMOVAL OF HARDWARE performed by DO Humberto at [facility] OR     Family History   Problem Relation Age of Onset    High Blood Pressure Mother     High Cholesterol Mother     Diabetes Father     Obesity Father     Thyroid Disease Father     Stroke Father     High Cholesterol Father     High Blood Pressure Father     Heart Disease Father         CHF    Kidney Disease Father     Heart Attack Father     Thyroid Disease Sister     Celiac Disease Sister     Thyroid Disease Brother     Diabetes Brother     High Blood Pressure Brother     Heart Disease Maternal Grandmother     High Blood Pressure Maternal Grandmother     Diabetes Maternal Grandmother     Diabetes Maternal Grandfather     High Blood Pressure Maternal Grandfather     Thyroid Disease Paternal Grandmother     Heart Disease Paternal Grandfather     Diabetes Maternal Aunt     Diabetes Maternal Uncle     Breast Cancer Maternal Aunt 76    Breast Cancer Maternal Cousin 48    Breast Cancer Maternal Cousin 48    Ovarian Cancer Maternal Cousin     Breast Cancer Niece 36     Past Medical History:   Diagnosis Date    Anemia     Anxiety     Celiac disease     Difficult intubation     Intubated successfully 1/2009 with Glidescope #3, 7.0 ETT.  Eating disorder 10 years ago    anorexia, Dr. Matty Puente monitoring.  History of blood transfusion 1993.     Hypoglycemia     Hypothyroidism     Latex allergy, contact dermatitis     PONV (postoperative nausea and vomiting)     TMJ (dislocation of temporomandibular joint)     Vitamin D deficiency     follows with Dr. Juju Marvin:    04/01/22 1030   BP: 112/70   Pulse: 63   Temp: 98.3 °F (36.8 °C)   SpO2: 98%   Weight: 173 lb (78.5 kg)     BP Readings from Last 3 Encounters:   04/01/22 112/70   12/17/21 100/70   11/04/21 110/68        Physical Exam  Vitals and nursing note reviewed. Constitutional:       Appearance: She is well-developed. HENT:      Head: Normocephalic. Right Ear: External ear normal.      Left Ear: External ear normal.      Nose: Nose normal.   Eyes:      Conjunctiva/sclera: Conjunctivae normal.      Pupils: Pupils are equal, round, and reactive to light. Cardiovascular:      Rate and Rhythm: Normal rate. Pulmonary:      Breath sounds: Normal breath sounds. Abdominal:      General: Bowel sounds are normal.      Palpations: Abdomen is soft. Musculoskeletal:         General: Normal range of motion. Cervical back: Normal range of motion and neck supple. Skin:     General: Skin is warm and dry. Neurological:      Mental Status: She is alert and oriented to person, place, and time. Psychiatric:         Behavior: Behavior normal.        Today's vitals physical examination overall stable. Heart is controlled lungs are clear. Physically she looks well and we will sit tight with current meds and care. Chemistries all look good. Thyroid is been controlled. X-ray of the knee today. Follow-up with me next week and then orthopedic assessment if needed. Further follow-up and discussion at that time.       Lab Results   Component Value Date    TSH 0.772 09/14/2021    TSH 0.964 02/27/2021    K1DHXPD 7.2 09/14/2021    R8PYUYH 7.4 02/27/2021    FT3 2.5 01/16/2013    T4FREE 1.17 09/07/2019    T4FREE 1.34 03/02/2019     Lab Results   Component Value Date    CHOL 195 09/14/2021    CHOL 192 06/25/2021     Lab Results   Component Value Date    TRIG 60 09/14/2021    TRIG 52 06/25/2021     Lab Results   Component Value Date    HDL 66 09/14/2021    HDL 71 06/25/2021     No results found for: [facility]  Lab Results   Component Value Date    LABVLDL 12 09/14/2021 LABVLDL 13 02/27/2021     No results found for: [facility]  Lab Results   Component Value Date    WBC 3.8 (L) 04/01/2022    HGB 12.7 04/01/2022    HCT 37.4 04/01/2022    MCV 94.4 04/01/2022     04/01/2022    LYMPHOPCT 29.0 04/01/2022    RBC 3.96 04/01/2022    MCH 32.1 04/01/2022    MCHC 34.0 04/01/2022    RDW 12.1 04/01/2022     Lab Results   Component Value Date     04/01/2022    K 4.6 04/01/2022     04/01/2022    CO2 28 04/01/2022    BUN 13 04/01/2022    CREATININE 0.8 04/01/2022    GLUCOSE 94 04/01/2022    CALCIUM 9.9 04/01/2022    PROT 7.3 04/01/2022    LABALBU 4.5 04/01/2022    BILITOT 0.5 04/01/2022    ALKPHOS 60 04/01/2022    AST 21 04/01/2022    ALT 11 04/01/2022    LABGLOM >60 04/01/2022    GFRAA >60 04/01/2022      No results found for: PSA, PSADIA   Lab Results   Component Value Date    LABA1C 5.0 03/23/2018    LABA1C 5.7 03/17/2014    LABA1C 5.6 01/10/2014     No results found for: EAG     Plan Per Assessment:  Vaishali Hale was seen today for discuss labs. Diagnoses and all orders for this visit:    Chronic pain of left knee    Hypothyroidism, unspecified type    Hyperlipidemia, unspecified hyperlipidemia type    Gastroesophageal reflux disease without esophagitis    Other orders  -     esomeprazole (NEXIUM) 40 MG delayed release capsule; TAKE ONE CAPSULE BY MOUTH EVERY MORNING BEFORE BREAKFAST            No follow-ups on file. Gar Alert, DO    Note was generated with the assistance of voice recognition software. Document was reviewed however may contain grammatical errors.

## 2022-04-08 ENCOUNTER — OFFICE VISIT (OUTPATIENT)
Dept: PRIMARY CARE CLINIC | Age: 57
End: 2022-04-08
Payer: COMMERCIAL

## 2022-04-08 VITALS
OXYGEN SATURATION: 98 % | DIASTOLIC BLOOD PRESSURE: 70 MMHG | TEMPERATURE: 98.1 F | HEART RATE: 59 BPM | SYSTOLIC BLOOD PRESSURE: 110 MMHG

## 2022-04-08 DIAGNOSIS — E03.9 HYPOTHYROIDISM, UNSPECIFIED TYPE: ICD-10-CM

## 2022-04-08 DIAGNOSIS — M25.562 CHRONIC PAIN OF LEFT KNEE: Primary | ICD-10-CM

## 2022-04-08 DIAGNOSIS — R05.9 COUGH: ICD-10-CM

## 2022-04-08 DIAGNOSIS — G89.29 CHRONIC PAIN OF LEFT KNEE: Primary | ICD-10-CM

## 2022-04-08 PROCEDURE — 99214 OFFICE O/P EST MOD 30 MIN: CPT | Performed by: FAMILY MEDICINE

## 2022-04-08 RX ORDER — AZITHROMYCIN 250 MG/1
TABLET, FILM COATED ORAL
Qty: 1 PACKET | Refills: 0 | Status: SHIPPED
Start: 2022-04-08 | End: 2022-05-05

## 2022-04-08 ASSESSMENT — ENCOUNTER SYMPTOMS
COUGH: 1
ALLERGIC/IMMUNOLOGIC NEGATIVE: 1
GASTROINTESTINAL NEGATIVE: 1
EYES NEGATIVE: 1

## 2022-04-08 NOTE — PROGRESS NOTES
22     Shira Bradford    : 1965 Sex: female   Age: 61 y.o. Chief Complaint   Patient presents with    Knee Pain     recheck not noticing much improvement    Congestion     started a few days ago       Prior to Admission medications    Medication Sig Start Date End Date Taking? Authorizing Provider   azithromycin (ZITHROMAX Z-ALEXSANDER) 250 MG tablet 2 today  Then 1 qd  4 days 22  Yes Regis Kern, DO   esomeprazole (NEXIUM) 40 MG delayed release capsule TAKE ONE CAPSULE BY MOUTH EVERY MORNING BEFORE BREAKFAST 22  Yes Kishor Kern DO   FLUoxetine (PROZAC) 10 MG capsule TAKE 1 CAPSULE BY MOUTH 2 TIMES DAILY. 3/4/22  Yes Regis Kern DO   levothyroxine (SYNTHROID) 75 MCG tablet TAKE 1 TABLET BY MOUTH ONE TIME A DAY 22  Yes Regis Kern DO   gabapentin (NEURONTIN) 300 MG capsule TAKE 1 CAPSULE BY MOUTH EVERY NIGHT AT BEDTIME 21 Yes Kishor Kern, DO   fluticasone (FLONASE) 50 MCG/ACT nasal spray USE 2 SPRAYS IN EACH NOSTRIL EVERY DAY 20  Yes Historical Provider, MD   estradiol (ESTRACE) 0.1 MG/GM vaginal cream  12/15/20  Yes Historical Provider, MD   ibuprofen (ADVIL;MOTRIN) 800 MG tablet Take 1 tablet by mouth 2 times daily as needed for Pain 21  Yes Regis Kern DO   calcium citrate (CALCITRATE) 250 MG TABS tablet Take 1 tablet by mouth 2 times daily 19  Yes Ricardo Mccoy PA-C   Cholecalciferol (VITAMIN D-3) 5000 UNITS TABS Take by mouth daily Instructed to hold   Yes Historical Provider, MD          HPI: Evaluated today problems with left knee pain that is been persistent. Continued swelling over the tibial tuberosity. X-ray revealed no acute fractures but given continued tenderness and swelling will have her further evaluated by orthopedics. Referral made today. Hypothyroidism stable. Cough congestion sinus drainage is present and she is currently taking some over-the-counter Benadryl without much success. Recommended some Mucinex DM twice a day. Addition of Z-Alexsander today. Review of Systems   Constitutional: Negative. HENT: Positive for congestion. Eyes: Negative. Respiratory: Positive for cough. Gastrointestinal: Negative. Endocrine: Negative. Genitourinary: Negative. Musculoskeletal: Positive for arthralgias and gait problem. Skin: Negative. Allergic/Immunologic: Negative. Hematological: Negative. Psychiatric/Behavioral: Negative. Current Outpatient Medications:     azithromycin (ZITHROMAX Z-ALEXSANDER) 250 MG tablet, 2 today  Then 1 qd  4 days, Disp: 1 packet, Rfl: 0    esomeprazole (NEXIUM) 40 MG delayed release capsule, TAKE ONE CAPSULE BY MOUTH EVERY MORNING BEFORE BREAKFAST, Disp: 30 capsule, Rfl: 5    FLUoxetine (PROZAC) 10 MG capsule, TAKE 1 CAPSULE BY MOUTH 2 TIMES DAILY. , Disp: 180 capsule, Rfl: 1    levothyroxine (SYNTHROID) 75 MCG tablet, TAKE 1 TABLET BY MOUTH ONE TIME A DAY, Disp: 90 tablet, Rfl: 1    gabapentin (NEURONTIN) 300 MG capsule, TAKE 1 CAPSULE BY MOUTH EVERY NIGHT AT BEDTIME, Disp: 90 capsule, Rfl: 0    fluticasone (FLONASE) 50 MCG/ACT nasal spray, USE 2 SPRAYS IN EACH NOSTRIL EVERY DAY, Disp: , Rfl:     estradiol (ESTRACE) 0.1 MG/GM vaginal cream, , Disp: , Rfl:     ibuprofen (ADVIL;MOTRIN) 800 MG tablet, Take 1 tablet by mouth 2 times daily as needed for Pain, Disp: 60 tablet, Rfl: 1    calcium citrate (CALCITRATE) 250 MG TABS tablet, Take 1 tablet by mouth 2 times daily, Disp: 60 tablet, Rfl: 2    Cholecalciferol (VITAMIN D-3) 5000 UNITS TABS, Take by mouth daily Instructed to hold, Disp: , Rfl:     Allergies   Allergen Reactions    Latex     Prednisone        Social History     Tobacco Use    Smoking status: Never Smoker    Smokeless tobacco: Never Used   Substance Use Topics    Alcohol use: Yes     Comment: social    Drug use: No      Past Surgical History:   Procedure Laterality Date    COLONOSCOPY  1/2015    DILATION AND CURETTAGE OF UTERUS  2007, 2006    HYSTERECTOMY  2008   [facility]  1997, 1986, [address] [place]    x 4    OTHER SURGICAL HISTORY  10/8/15    bilateral tmj arthrodesis/steroid injection    UPPER GASTROINTESTINAL ENDOSCOPY  12/17/2014    UPPER GASTROINTESTINAL ENDOSCOPY  04/12/2017    UPPER GASTROINTESTINAL ENDOSCOPY N/A 7/13/2018    EGD BIOPSY performed by Carloz Dubon MD at [address] Left 1/16/2019    LEFT WRIST OPEN REDUCTION INTERNAL FIXATION performed by Raleigh Muse DO at [facility] Left 7/19/2019    LEFT WRIST  REMOVAL OF HARDWARE performed by Raleigh Muse DO at [facility] OR     Family History   Problem Relation Age of Onset    High Blood Pressure Mother     High Cholesterol Mother     Diabetes Father     Obesity Father     Thyroid Disease Father     Stroke Father     High Cholesterol Father     High Blood Pressure Father     Heart Disease Father         CHF    Kidney Disease Father     Heart Attack Father     Thyroid Disease Sister     Celiac Disease Sister     Thyroid Disease Brother     Diabetes Brother     High Blood Pressure Brother     Heart Disease Maternal Grandmother     High Blood Pressure Maternal Grandmother     Diabetes Maternal Grandmother     Diabetes Maternal Grandfather     High Blood Pressure Maternal Grandfather     Thyroid Disease Paternal Grandmother     Heart Disease Paternal Grandfather     Diabetes Maternal Aunt     Diabetes Maternal Uncle     Breast Cancer Maternal Aunt 76    Breast Cancer Maternal Cousin 48    Breast Cancer Maternal Cousin 48    Ovarian Cancer Maternal Cousin     Breast Cancer Niece 36     Past Medical History:   Diagnosis Date    Anemia     Anxiety     Celiac disease     Difficult intubation     Intubated successfully 1/2009 with Glidescope #3, 7.0 ETT.  Eating disorder 10 years ago    dimitris, Dr. Mcclain monitoring.     History of blood transfusion 1993.    Hypoglycemia     Hypothyroidism     Latex allergy, contact dermatitis     PONV (postoperative nausea and vomiting)     TMJ (dislocation of temporomandibular joint)     Vitamin D deficiency     follows with Dr. Lua Haw:    04/08/22 1010   BP: 110/70   Pulse: 59   Temp: 98.1 °F (36.7 °C)   SpO2: 98%     BP Readings from Last 3 Encounters:   04/08/22 110/70   04/01/22 112/70   12/17/21 100/70        Physical Exam  Vitals and nursing note reviewed. Constitutional:       Appearance: She is well-developed. HENT:      Head: Normocephalic. Right Ear: External ear normal.      Left Ear: External ear normal.      Nose: Congestion present. Eyes:      Conjunctiva/sclera: Conjunctivae normal.      Pupils: Pupils are equal, round, and reactive to light. Cardiovascular:      Rate and Rhythm: Normal rate. Pulmonary:      Breath sounds: Normal breath sounds. Abdominal:      General: Bowel sounds are normal.      Palpations: Abdomen is soft. Musculoskeletal:         General: Normal range of motion. Cervical back: Normal range of motion and neck supple. Skin:     General: Skin is warm and dry. Neurological:      Mental Status: She is alert and oriented to person, place, and time. Psychiatric:         Behavior: Behavior normal.     Today on exam upper respiratory congestion is present as noted. Mild throat irritation. Addition of a Z-Kingsley as noted. Knee swelling persist.  X-ray reviewed with her stable. Orthopedic assessment recommended. Follow-up visit with me in the month. Plan Per Assessment:  Gil Vu was seen today for knee pain and congestion.     Diagnoses and all orders for this visit:    Chronic pain of left knee  -     Ulysses Anand DO, Orthopaedics, [facility]    Hypothyroidism, unspecified type    Cough  -     azithromycin (ZITHROMAX Z-KINGSLEY) 250 MG tablet; 2 today  Then 1 qd  4 days            Return in about 4 weeks (around 5/6/2022). Aurelio Ambriz,     Note was generated with the assistance of voice recognition software. Document was reviewed however may contain grammatical errors.

## 2022-04-13 ENCOUNTER — TELEPHONE (OUTPATIENT)
Dept: ORTHOPEDIC SURGERY | Age: 57
End: 2022-04-13

## 2022-04-13 NOTE — TELEPHONE ENCOUNTER
Pt called in to [facility] an appt. We have a referral from Dr. Nila Cummins. Tom Jurist can be reached at [phone]. Thank you.

## 2022-04-15 NOTE — TELEPHONE ENCOUNTER
Referral received from PCP for Orthopedics. Providers declined referral, scanned into patient's media.

## 2022-05-05 ENCOUNTER — OFFICE VISIT (OUTPATIENT)
Dept: ORTHOPEDIC SURGERY | Age: 57
End: 2022-05-05
Payer: COMMERCIAL

## 2022-05-05 VITALS — WEIGHT: 170 LBS | BODY MASS INDEX: 29.02 KG/M2 | HEIGHT: 64 IN

## 2022-05-05 DIAGNOSIS — S80.02XA CONTUSION OF LEFT KNEE, INITIAL ENCOUNTER: Primary | ICD-10-CM

## 2022-05-05 PROCEDURE — 99203 OFFICE O/P NEW LOW 30 MIN: CPT | Performed by: ORTHOPAEDIC SURGERY

## 2022-05-05 NOTE — PROGRESS NOTES
Chief Complaint:   Chief Complaint   Patient presents with    Knee Pain     Left anterior knee lump. Has pain and medial knee numbness. Zulma Chiquita on ice in work parking lot 2/8/2022. Xrays taken. HPI 48-year-old woman injured her left knee when she fell on ice in a parking lot in February of this year. Had x-rays last February left knee demonstrating a prominent tibial tubercle but no acute bony injury and very little degenerative changes left knee. Patient states she is has some mild anterior discomfort especially when she wears high-heeled shoes and a small area of anterolateral numbness adjacent to the tibial tubercle. She is primary concerned about the appearance of the bump. She does not recall having a previous bony prominence in this area or any significant prior knee symptoms. Patient Active Problem List   Diagnosis    Difficult intubation    Latex allergy, contact dermatitis    Fracture, Col[place], left, closed    Painful orthopaedic hardware ([address]    Hypothyroidism    Gastroesophageal reflux disease without esophagitis    Depression    Celiac disease    Weight gain    Mass of left wrist    Hyperlipidemia    Hypoglycemia    Impaired fasting glucose    Chronic nonintractable headache    Balance disorder    Chronic maxillary sinusitis    Ganglion cyst of finger of right hand    Anxiety    TMJ (temporomandibular joint syndrome)    Chronic left shoulder pain    Chronic pain of left knee    Cough       Past Medical History:   Diagnosis Date    Anemia     Anxiety     Celiac disease     Difficult intubation     Intubated successfully 1/2009 with Glidescope #3, 7.0 ETT.  Eating disorder 10 years ago    Dr. Claudia shanks monitoring.  History of blood transfusion 1993.     Hypoglycemia     Hypothyroidism     Latex allergy, contact dermatitis     PONV (postoperative nausea and vomiting)     TMJ (dislocation of temporomandibular joint)     Vitamin D deficiency follows with Dr. Delcid Art       Past Surgical History:   Procedure Laterality Date    COLONOSCOPY  1/2015   [place] DILATION AND CURETTAGE OF UTERUS  2007, 2006    HYSTERECTOMY  2008   [address]    x 4    OTHER SURGICAL HISTORY  10/8/15    bilateral tmj arthrodesis/steroid injection    UPPER GASTROINTESTINAL ENDOSCOPY  12/17/2014    UPPER GASTROINTESTINAL ENDOSCOPY  04/12/2017    UPPER GASTROINTESTINAL ENDOSCOPY N/A 7/13/2018    EGD BIOPSY performed by Raven Glasgow MD at [address] Left 1/16/2019    LEFT WRIST OPEN REDUCTION INTERNAL FIXATION performed by Laraine Harada, DO at [facility] Left 7/19/2019    LEFT WRIST  REMOVAL OF HARDWARE performed by Laraine Harada, DO at [facility] OR       Current Outpatient Medications   Medication Sig Dispense Refill    Ascorbic Acid (FABI-C PO) Take by mouth daily      esomeprazole (NEXIUM) 40 MG delayed release capsule TAKE ONE CAPSULE BY MOUTH EVERY MORNING BEFORE BREAKFAST 30 capsule 5    FLUoxetine (PROZAC) 10 MG capsule TAKE 1 CAPSULE BY MOUTH 2 TIMES DAILY. 180 capsule 1    levothyroxine (SYNTHROID) 75 MCG tablet TAKE 1 TABLET BY MOUTH ONE TIME A DAY 90 tablet 1    gabapentin (NEURONTIN) 300 MG capsule TAKE 1 CAPSULE BY MOUTH EVERY NIGHT AT BEDTIME (Patient taking differently: Take 300 mg by mouth as needed.  TAKE 1 CAPSULE BY MOUTH EVERY NIGHT AT BEDTIME) 90 capsule 0    fluticasone (FLONASE) 50 MCG/ACT nasal spray USE 2 SPRAYS IN EACH NOSTRIL EVERY DAY      estradiol (ESTRACE) 0.1 MG/GM vaginal cream Place vaginally as needed       Cholecalciferol (VITAMIN D-3) 5000 UNITS TABS Take by mouth daily       ibuprofen (ADVIL;MOTRIN) 800 MG tablet Take 1 tablet by mouth 2 times daily as needed for Pain (Patient not taking: Reported on 5/5/2022) 60 tablet 1    calcium citrate (CALCITRATE) 250 MG TABS tablet Take 1 tablet by mouth 2 times daily (Patient not taking: Reported on 5/5/2022) 60 tablet 2     No current facility-administered medications for this visit. Allergies   Allergen Reactions    Latex     Prednisone        Social History     Socioeconomic History    Marital status:      Spouse name: None    Number of children: None    Years of education: None    Highest education level: None   Occupational History    None   Tobacco Use    Smoking status: Never Smoker    Smokeless tobacco: Never Used   Substance and Sexual Activity    Alcohol use: Yes     Comment: social    Drug use: No    Sexual activity: None   Other Topics Concern    None   Social History Narrative    None     Social Determinants of Health     Financial Resource Strain:     Difficulty of Paying Living Expenses: Not on file   Food Insecurity:     Worried About Running Out of Food in the Last Year: Not on file    Josue of Food in the Last Year: Not on file   Transportation Needs:     Lack of Transportation (Medical): Not on file    Lack of Transportation (Non-Medical):  Not on file   Physical Activity:     Days of Exercise per Week: Not on file    Minutes of Exercise per Session: Not on file   Stress:     Feeling of Stress : Not on file   Social Connections:     Frequency of Communication with Friends and Family: Not on file    Frequency of Social Gatherings with Friends and Family: Not on file    Attends Yazidi Services: Not on file    Active Member of [address] [place] [address] or Organizations: Not on file    Attends Club or Organization Meetings: Not on file    Marital Status: Not on file   Intimate Partner Violence:     Fear of Current or Ex-Partner: Not on file    Emotionally Abused: Not on file    Physically Abused: Not on file    Sexually Abused: Not on file   Housing Stability:     Unable to Pay for Housing in the Last Year: Not on file    Number of [place] in the Last Year: Not on file    Unstable Housing in the Last Year: Not on file       Family History   Problem Relation Age of Onset    High Blood Pressure Mother     High Cholesterol Mother     Diabetes Father     Obesity Father     Thyroid Disease Father     Stroke Father     High Cholesterol Father     High Blood Pressure Father     Heart Disease Father         CHF    Kidney Disease Father     Heart Attack Father     Thyroid Disease Sister     Celiac Disease Sister     Thyroid Disease Brother     Diabetes Brother     High Blood Pressure Brother     Heart Disease Maternal Grandmother     High Blood Pressure Maternal Grandmother     Diabetes Maternal Grandmother     Diabetes Maternal Grandfather     High Blood Pressure Maternal Grandfather     Thyroid Disease Paternal Grandmother     Heart Disease Paternal Grandfather     Diabetes Maternal Aunt     Diabetes Maternal Uncle     Breast Cancer Maternal Aunt 76    Breast Cancer Maternal Cousin 48    Breast Cancer Maternal Cousin 48    Ovarian Cancer Maternal Cousin     Breast Cancer Niece 40         Review of Systems   No fever, chills, or other constitutionalsymptoms. No numbness or other neuro symptoms. No chest pain. No dyspnea. [unfilled]   No acute distress. Left knee exam does demonstrate some clinical enlargement of the left tibial tubercle compared to the right. This appears to be subcutaneous. It is not erythematous. There is some residual skin pigment changes medially which she says has been present since she fell on it. No drainable fluid not very tender to palpation. No knee joint effusion. Full active range of motion. No pain with left hip rotation. Left knee is stable to varus valgus and Lachman testing. Physical Exam    Patient is alert and oriented. Well-developed well-nourished. Pupils equal and reactive. Scleraeanicteric. Neck supple  Lungs clear. Cardiac rate and rhythm regular. Abdomen soft and nontender. Skin warm and dry. XRAY: Prior left knee x-rays AP and lateral views reviewed.   There is some chronic and prominence of the anterior tibial tubercle. There is no fracture line or other adverse change noted. ASSESSMENT/PLAN:    John Simeon was seen today for knee pain. Diagnoses and all orders for this visit:    Left knee pain, unspecified chronicity    Left anterior knee contusion probably with resolving periosteal thickening. Her extensor mechanism is intact. Her symptoms and findings are objectively mild. In the process may continue to resolve for at least 6 months. In any case there is no specific intervention identified to recommend such as drainage or any surgical procedure. Prognosis should be good. Reviewed heat and light range of motion and quad exercises. No follow-ups on file.        Otis Peoples MD    5/5/2022  8:45 AM

## 2022-05-08 PROBLEM — R05.9 COUGH: Status: RESOLVED | Noted: 2022-04-08 | Resolved: 2022-05-08

## 2022-05-13 RX ORDER — GABAPENTIN 300 MG/1
CAPSULE ORAL
Qty: 90 CAPSULE | Refills: 0 | Status: SHIPPED
Start: 2022-05-13 | End: 2022-06-17

## 2022-06-01 RX ORDER — ESOMEPRAZOLE MAGNESIUM 40 MG/1
CAPSULE, DELAYED RELEASE ORAL
Qty: 30 CAPSULE | Refills: 5 | Status: SHIPPED
Start: 2022-06-01 | End: 2022-08-02 | Stop reason: SDUPTHER

## 2022-06-17 ENCOUNTER — OFFICE VISIT (OUTPATIENT)
Dept: PRIMARY CARE CLINIC | Age: 57
End: 2022-06-17
Payer: COMMERCIAL

## 2022-06-17 VITALS
HEART RATE: 92 BPM | WEIGHT: 170 LBS | BODY MASS INDEX: 29.18 KG/M2 | OXYGEN SATURATION: 98 % | TEMPERATURE: 98 F | DIASTOLIC BLOOD PRESSURE: 70 MMHG | SYSTOLIC BLOOD PRESSURE: 114 MMHG

## 2022-06-17 DIAGNOSIS — T17.308A CHOKING, INITIAL ENCOUNTER: ICD-10-CM

## 2022-06-17 DIAGNOSIS — S80.02XD CONTUSION OF LEFT KNEE AND LOWER LEG, SUBSEQUENT ENCOUNTER: ICD-10-CM

## 2022-06-17 DIAGNOSIS — E03.9 HYPOTHYROIDISM, UNSPECIFIED TYPE: Primary | ICD-10-CM

## 2022-06-17 DIAGNOSIS — K21.9 GASTROESOPHAGEAL REFLUX DISEASE, UNSPECIFIED WHETHER ESOPHAGITIS PRESENT: ICD-10-CM

## 2022-06-17 DIAGNOSIS — S80.12XD CONTUSION OF LEFT KNEE AND LOWER LEG, SUBSEQUENT ENCOUNTER: ICD-10-CM

## 2022-06-17 DIAGNOSIS — K90.0 CELIAC DISEASE: ICD-10-CM

## 2022-06-17 PROBLEM — S80.12XA CONTUSION OF LEFT KNEE AND LOWER LEG: Status: ACTIVE | Noted: 2022-06-17

## 2022-06-17 PROBLEM — S80.02XA CONTUSION OF LEFT KNEE AND LOWER LEG: Status: ACTIVE | Noted: 2022-06-17

## 2022-06-17 PROCEDURE — 99214 OFFICE O/P EST MOD 30 MIN: CPT | Performed by: FAMILY MEDICINE

## 2022-06-17 ASSESSMENT — ENCOUNTER SYMPTOMS
EYES NEGATIVE: 1
GASTROINTESTINAL NEGATIVE: 1
ALLERGIC/IMMUNOLOGIC NEGATIVE: 1
RESPIRATORY NEGATIVE: 1

## 2022-06-17 NOTE — PROGRESS NOTES
22     Annalee Avalos    : 1965 Sex: female   Age: 61 y.o. Chief Complaint   Patient presents with    Knee Pain     doing better did see dr gunn    Cough     notices choking and coughing since surgery last year. Prior to Admission medications    Medication Sig Start Date End Date Taking? Authorizing Provider   esomeprazole (NEXIUM) 40 MG delayed release capsule TAKE ONE CAPSULE BY MOUTH EVERY MORNING BEFORE BREAKFAST 22  Yes Wayne Kern DO   Ascorbic Acid (FABI-C PO) Take by mouth daily   Yes Historical Provider, MD   FLUoxetine (PROZAC) 10 MG capsule TAKE 1 CAPSULE BY MOUTH 2 TIMES DAILY. 3/4/22  Yes Wayne Kern DO   levothyroxine (SYNTHROID) 75 MCG tablet TAKE 1 TABLET BY MOUTH ONE TIME A DAY 22  Yes Wayne Kern DO   estradiol (ESTRACE) 0.1 MG/GM vaginal cream Place vaginally as needed  12/15/20  Yes Historical Provider, MD   calcium citrate (CALCITRATE) 250 MG TABS tablet Take 1 tablet by mouth 2 times daily 19  Yes John Ellis PA-C   Cholecalciferol (VITAMIN D-3) 5000 UNITS TABS Take by mouth daily    Yes Historical Provider, MD   gabapentin (NEURONTIN) 300 MG capsule TAKE 1 CAPSULE BY MOUTH EVERY NIGHT AT BEDTIME  Patient not taking: Reported on 2022  Wayne Kern DO   ibuprofen (ADVIL;MOTRIN) 800 MG tablet Take 1 tablet by mouth 2 times daily as needed for Pain  Patient not taking: Reported on 2022   Rocio Hall DO          HPI: Patient seen today hypothyroid celiac disease reflux disease with problems of choking since she had her TMJ surgery. We are going to increase Nexium to 1 in the morning 1 in the evening at least over the next 6 weeks and then reassess. Recommending consultation with Dr. Osbaldo Blank and possible upper endoscopy. Review of Systems   Constitutional: Negative. HENT: Negative. Eyes: Negative. Respiratory: Negative. Gastrointestinal: Negative. Endocrine: Negative. Genitourinary: Negative. Musculoskeletal: Negative. Skin: Negative. Allergic/Immunologic: Negative. Neurological: Negative. Hematological: Negative. Psychiatric/Behavioral: Negative. systems review is overall stable aside from the reflux and choking as noted. Current Outpatient Medications:     esomeprazole (NEXIUM) 40 MG delayed release capsule, TAKE ONE CAPSULE BY MOUTH EVERY MORNING BEFORE BREAKFAST, Disp: 30 capsule, Rfl: 5    Ascorbic Acid (FABI-C PO), Take by mouth daily, Disp: , Rfl:     FLUoxetine (PROZAC) 10 MG capsule, TAKE 1 CAPSULE BY MOUTH 2 TIMES DAILY. , Disp: 180 capsule, Rfl: 1    levothyroxine (SYNTHROID) 75 MCG tablet, TAKE 1 TABLET BY MOUTH ONE TIME A DAY, Disp: 90 tablet, Rfl: 1    estradiol (ESTRACE) 0.1 MG/GM vaginal cream, Place vaginally as needed , Disp: , Rfl:     calcium citrate (CALCITRATE) 250 MG TABS tablet, Take 1 tablet by mouth 2 times daily, Disp: 60 tablet, Rfl: 2    Cholecalciferol (VITAMIN D-3) 5000 UNITS TABS, Take by mouth daily , Disp: , Rfl:     gabapentin (NEURONTIN) 300 MG capsule, TAKE 1 CAPSULE BY MOUTH EVERY NIGHT AT BEDTIME (Patient not taking: Reported on 6/17/2022), Disp: 90 capsule, Rfl: 0    ibuprofen (ADVIL;MOTRIN) 800 MG tablet, Take 1 tablet by mouth 2 times daily as needed for Pain (Patient not taking: Reported on 5/5/2022), Disp: 60 tablet, Rfl: 1    Allergies   Allergen Reactions    Latex     Prednisone        Social History     Tobacco Use    Smoking status: Never Smoker    Smokeless tobacco: Never Used   Substance Use Topics    Alcohol use: Yes     Comment: social    Drug use: No      Past Surgical History:   Procedure Laterality Date    COLONOSCOPY  1/2015    DILATION AND CURETTAGE OF UTERUS  2007, 2006    HYSTERE[address]OMY  [address]   [address], 1986, 1987, 1990    x 4    OTHER SURGICAL HISTORY  10/8/15    bilateral tmj arthrodesis/steroid injection    UPPER GASTROINTESTINAL grammatical errors.

## 2022-07-14 ENCOUNTER — OFFICE VISIT (OUTPATIENT)
Dept: SURGERY | Age: 57
End: 2022-07-14
Payer: COMMERCIAL

## 2022-07-14 VITALS
OXYGEN SATURATION: 96 % | SYSTOLIC BLOOD PRESSURE: 129 MMHG | RESPIRATION RATE: 18 BRPM | DIASTOLIC BLOOD PRESSURE: 65 MMHG | HEART RATE: 84 BPM | TEMPERATURE: 97.3 F | BODY MASS INDEX: 29.02 KG/M2 | HEIGHT: 64 IN | WEIGHT: 170 LBS

## 2022-07-14 DIAGNOSIS — R13.13 PHARYNGEAL DYSPHAGIA: Primary | ICD-10-CM

## 2022-07-14 PROCEDURE — 99203 OFFICE O/P NEW LOW 30 MIN: CPT | Performed by: SURGERY

## 2022-07-14 NOTE — PROGRESS NOTES
[address] [facility] Surgery Clinic Note    Assessment/Plan:      Diagnosis Orders   1. Pharyngeal dysphagia  FL MODIFIED BARIUM SWALLOW W VIDEO    US HEAD NECK SOFT TISSUE THYROID    We will plan for barium swallow and ultrasound. We will plan for EGD with dilation. Temporally related to TMJ surgery             Return for EGD, Results. Chief Complaint   Patient presents with    New Patient     reflux       PCP: Merary Hyatt DO    HPI: Brody Duenas is a 61 y.o. female who presents in consultation for pharyngeal dysphagia. She says it began a year ago after she had reconstruction for severe TMJ. She says she had [facility] Dr. Uday Rogers who told her it was unrelated. She says ever since the surgery she has had increased coughing and choking with food and water. She also loses her voice she states. She is on Nexium. She has a history of celiac disease. She says she is on a gluten-free diet and her symptoms are controlled with that. She denies any melena. 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 Past Medical History:   Diagnosis Date    Anemia     Anxiety     Celiac disease     Difficult intubation     Intubated successfully 1/2009 with Glidescope #3, 7.0 ETT. Eating disorder 10 years ago    anorexia, Dr. Clara To monitoring. History of blood transfusion 1993.     Hypoglycemia     Hypothyroidism     Latex allergy, contact dermatitis     PONV (postoperative nausea and vomiting)     TMJ (dislocation of temporomandibular joint)     Vitamin D deficiency     follows with Dr. Clara To       Past Surgical History:   Procedure Laterality Date    COLONOSCOPY  1/2015    DILATION AND CURETTAGE OF UTERUS  2007, 2006    HYSTERECTOMY (CERVIX STATUS UNKNOWN)  2008    [address]    x 4    OTHER SURGICAL HISTORY  10/8/15    bilateral tmj arthrodesis/steroid injection    UPPER GASTROINTESTINAL ENDOSCOPY  12/17/2014    UPPER GASTROINTESTINAL ENDOSCOPY  04/12/2017    UPPER GASTROINTESTINAL ENDOSCOPY N/A 7/13/2018    EGD BIOPSY performed by Dennie Salen, MD at [facility] 1/16/2019    LEFT WRIST OPEN REDUCTION INTERNAL FIXATION performed by Susanna Perez DO at [address] Left 7/19/2019    LEFT WRIST  REMOVAL OF HARDWARE performed by Susanna Perez DO at [address]       Prior to Admission medications    Medication Sig Start Date End Date Taking? Authorizing Provider   esomeprazole (NEXIUM) 40 MG delayed release capsule TAKE ONE CAPSULE BY MOUTH EVERY MORNING BEFORE BREAKFAST 6/1/22  Yes Oren Kern DO   Ascorbic Acid (FABI-C PO) Take by mouth daily   Yes Historical Provider, MD   FLUoxetine (PROZAC) 10 MG capsule TAKE 1 CAPSULE BY MOUTH 2 TIMES DAILY.  3/4/22  Yes Oren Kern DO   levothyroxine (SYNTHROID) 75 MCG tablet TAKE 1 TABLET BY MOUTH ONE TIME A DAY 2/7/22  Yes Oren Kern DO   estradiol (ESTRACE) 0.1 MG/GM vaginal cream Place vaginally as needed  12/15/20  Yes Historical Provider, MD   Cholecalciferol (VITAMIN D-3) 5000 UNITS TABS Take by mouth daily    Yes Historical Provider, MD       Allergies   Allergen Reactions    Latex     Prednisone        Social History     Socioeconomic History    Marital status:      Spouse name: None    Number of children: None    Years of education: None    Highest education level: None   Tobacco Use    Smoking status: Never    Smokeless tobacco: Never   Substance and Sexual Activity    Alcohol use: Yes     Comment: social    Drug use: No       Family History   Problem Relation Age of Onset    High Blood Pressure Mother     High Cholesterol Mother     Diabetes Father     Obesity Father     Thyroid Disease Father     Stroke Father     High Cholesterol Father     High Blood Pressure Father     Heart Disease Father         CHF    Kidney Disease Father     Heart Attack Father     Thyroid Disease Sister Celiac Disease Sister     Thyroid Disease Brother     Diabetes Brother     High Blood Pressure Brother     Heart Disease Maternal Grandmother     High Blood Pressure Maternal Grandmother     Diabetes Maternal Grandmother     Diabetes Maternal Grandfather     High Blood Pressure Maternal Grandfather     Thyroid Disease Paternal Grandmother     Heart Disease Paternal Grandfather     Diabetes Maternal Aunt     Diabetes Maternal Uncle     Breast Cancer Maternal Aunt 76    Breast Cancer Maternal Cousin 48    Breast Cancer Maternal Cousin 48    Ovarian Cancer Maternal Cousin     Breast Cancer Niece 36       Review of Systems   All other systems reviewed and are negative. Objective:  Vitals:    07/14/22 1603   BP: 129/65   Pulse: 84   Resp: 18   Temp: 97.3 °F (36.3 °C)   TempSrc: Temporal   SpO2: 96%   Weight: 170 lb (77.1 kg)   Height: 5' 4\" (1.626 m)          Physical Exam  Constitutional:       General: She is not in acute distress. Appearance: She is not diaphoretic. HENT:      Head: Normocephalic and atraumatic. Eyes:      General:         Right eye: No discharge. Left eye: No discharge. Neck:      Trachea: No tracheal deviation. Comments: No mass noted  Cardiovascular:      Rate and Rhythm: Normal rate. Pulmonary:      Effort: Pulmonary effort is normal. No respiratory distress. Abdominal:      General: There is no distension. Palpations: Abdomen is soft. Tenderness: There is no abdominal tenderness. There is no guarding or rebound. Musculoskeletal:      Cervical back: Neck supple. Skin:     General: Skin is warm and dry. Neurological:      Mental Status: She is alert and oriented to person, place, and time. Lizzy Contreras MD      NOTE: This report, in part or full,may have been transcribed using voice recognition software. Every effort was made to ensure accuracy; however, inadvertent computerized transcription errors may be present.  Please excuse any transcriptional grammatical or spelling errors that may have escaped my editorial review.     CC: Mitch Kern, DO

## 2022-07-18 ENCOUNTER — TELEPHONE (OUTPATIENT)
Dept: SURGERY | Age: 57
End: 2022-07-18

## 2022-07-18 NOTE — TELEPHONE ENCOUNTER
Patient is scheduled for barium swallow and ultrasound head/neck soft tissue thryoid on 07-27-22 eder JENNIFER at 2:30 pm, arrive at 2:00 pm.    Electronically signed by Xin Guillory MA on 7/18/2022 at 3:44 PM

## 2022-07-19 ENCOUNTER — TELEPHONE (OUTPATIENT)
Dept: SURGERY | Age: 57
End: 2022-07-19

## 2022-07-19 NOTE — TELEPHONE ENCOUNTER
Prior Authorization Form:      DEMOGRAPHICS:                     Patient Name:  Parminder Frederick  Patient :  1965            Insurance:  Payor: Jorge Giles 150 / Plan: ANGI BCBERTHA [address] / Product Type: *No Product type* /   Insurance ID Number:    Payer/Plan Subscr  Sex Relation Sub. Ins. ID Effective Group Num   1.  [address] AMANDA 1965 Female Self [id number]                                   [address]         DIAGNOSIS & PROCEDURE:                       Procedure/Operation: egd possible dilation           CPT Code: 56258    Diagnosis:  dysphagia    ICD10 Code: r13.10    Location:  [facility]    Surgeon:  Dr. Jonatan Sharma INFORMATION:                          Date: 22    Time: 12:00pm              Anesthesia:  lmac                                                       Status:  Outpatient        Special Comments:         Electronically signed by Jenifer Gaspar on 2022 at 8:33 AM

## 2022-07-19 NOTE — TELEPHONE ENCOUNTER
Erinn Cardoso is scheduled for egd with possible dilation with Dr Morena Leblanc on 8/18/22 at 12:00pm . Patient was told to arrive at 11:00am at [facility]. Patient needs to be NPO after midnight the night before procedure. All surgery instructions were explained to the patient and a surgery letter was also mailed out. MA informed patient that PAT will also be calling to review pre-op instructions and medications. Patient verbalized understanding.

## 2022-07-27 ENCOUNTER — HOSPITAL ENCOUNTER (OUTPATIENT)
Dept: GENERAL RADIOLOGY | Age: 57
Discharge: HOME | End: 2022-07-29
Payer: COMMERCIAL

## 2022-07-27 ENCOUNTER — HOSPITAL ENCOUNTER (OUTPATIENT)
Dept: ULTRASOUND IMAGING | Age: 57
Discharge: HOME | End: 2022-07-29
Payer: COMMERCIAL

## 2022-07-27 DIAGNOSIS — R13.13 PHARYNGEAL DYSPHAGIA: ICD-10-CM

## 2022-07-27 PROCEDURE — 92611 MOTION FLUOROSCOPY/SWALLOW: CPT

## 2022-07-27 PROCEDURE — 76536 US EXAM OF HEAD AND NECK: CPT

## 2022-07-27 PROCEDURE — 92526 ORAL FUNCTION THERAPY: CPT

## 2022-07-27 PROCEDURE — 74230 X-RAY XM SWLNG FUNCJ C+: CPT

## 2022-07-27 NOTE — PROGRESS NOTES
SPEECH/LANGUAGE PATHOLOGY  VIDEOFLUOROSCOPIC STUDY OF SWALLOWING (MBS)   and PLAN OF CARE    PATIENT NAME:  Akanksha Concepcion  (female)     MRN:  [id number]    :  1965  (61 y.o.)  STATUS:  Outpatient    TODAY'S DATE:  2022  REFERRING PROVIDER:   Dr. Merritt Marinelli: FL modified barium swallow with video  Date of order:  22   REASON FOR REFERRAL: dysphagia   EVALUATING THERAPIST: FRANCES Lin      RESULTS:      DYSPHAGIA DIAGNOSIS:  normal swallow function     DIET RECOMMENDATIONS:  Regular consistency solids (IDDSI level 7) with  thin liquids (IDDSI level 0)    FEEDING RECOMMENDATIONS:    Assistance level:  No assistance needed     Compensatory strategies recommended: No strategies are recommended at this time     Discussed recommendations with nursing and/or faxed report to referring provider: Yes    SPEECH THERAPY  PLAN OF CARE   The dysphagia POC is established based on physician order and dysphagia diagnosis    Dysphagia therapy is not recommended       Conditions Requiring Skilled Therapeutic Intervention for dysphagia:    not applicable    SPECIFIC DYSPHAGIA INTERVENTIONS TO INCLUDE:     Not applicable    Specific instructions for next treatment:  not applicable   Treatment Goals:    Short Term Goals:  Not applicable no therapy warranted     Long Term Goals:   Not applicable no therapy warranted      Patient/family Goal:    not applicable                    ADMITTING DIAGNOSIS: Pharyngeal dysphagia [R13.13]     VISIT DIAGNOSIS:   Visit Diagnoses         Codes    Pharyngeal dysphagia     R13.13                PATIENT REPORT/COMPLAINT: coughing with all consistencies    PRIOR LEVEL OF SWALLOW FUNCTION:    Past History of Dysphagia?:  none reported    Home diet: Regular consistency solids (IDDSI level 7) with  thin liquids (IDDSI level 0)      Current Diet Order:  No diet orders on file    PROCEDURE:  Consistencies Administered During the Evaluation   Liquids: thin liquid and nectar thick liquid   Solids:  pureed foods      Method of Intake:   cup, straw, spoon  Self fed, Fed by clinician      Position:   Standing, Lateral plane    INSTRUMENTAL ASSESSMENT:    ORAL PREPARATION PHASE:    Within functional limits    ORAL PHASE:   Within functional limits    PHARYNGEAL PHASE:     ONSET TIME       Onset time of the pharyngeal swallow was adequate       PHARYNGEAL RESIDUALS        Vallecula/Pharyngeal Wall           No significant residuals were noted in the vallecula      Pyriform Sinuses      No significant residuals were noted in the pyriform sinuses     LARYNGEAL PENETRATION   Trace laryngeal penetration without aspiration of thin liquids was noted only with use of a straw. ASPIRATION  Aspiration was not present during this evaluation    PENETRATION-ASPIRATION SCALE (PAS):  THIN 3 = Material enters the airway, remains above the vocal folds, and is not ejected from the airway  MILDLY THICK 1 = Material does not enter the airway  MODERATELY THICK item not administered  PUREE 1 = Material does not enter the airway  HARD SOLID item not administered       COMPENSATORY STRATEGIES    Compensatory strategies were not attempted      STRUCTURAL/FUNCTIONAL ANOMALIES   No structural/functional anomalies were noted    CERVICAL ESOPHAGEAL STAGE :     The cervical esophagus appeared adequate          ___________    Cognition:   Within functional limits for this exam    Oral Peripheral Examination   Adequate lingual/labial strength     Current Respiratory Status   room air     Parameters of Speech Production  Respiration:  Adequate for speech production  Quality:   Within functional limits  Intensity: Within functional limits    Pain: No pain reported. EDUCATION:   The Speech Language Pathologist (SLP) completed education regarding results of evaluation and that intervention is not warranted at this time.   Learner: Patient  Education: Reviewed results and recommendations of this evaluation and Reviewed diet and strategies  Evaluation of Education:  Verbalizes understanding    This plan may be re-evaluated and revised as warranted. Evaluation Time includes thorough review of current medical information, gathering information on past medical history/social history and prior level of function, completion of standardized testing/informal observation of tasks, assessment of data and education on plan of care and goals. [x]The admitting diagnosis and active problem list, have been reviewed prior to initiation of this evaluation.     CPT Code: 80610  dysphagia study    ACTIVE PROBLEM LIST:   Patient Active Problem List   Diagnosis    Difficult intubation    Latex allergy, contact dermatitis    Fracture, Col[place], left, closed    Painful orthopaedic hardware ([facility] [address]    Hypothyroidism    Gastroesophageal reflux disease    Depression    Celiac disease    Weight gain    Mass of left wrist    Hyperlipidemia    Hypoglycemia    Impaired fasting glucose    Chronic nonintractable headache    Balance disorder    Chronic maxillary sinusitis    Ganglion cyst of finger of right hand    Anxiety    TMJ (temporomandibular joint syndrome)    Chronic left shoulder pain    Chronic pain of left knee    Contusion of left knee and lower leg    Choking

## 2022-08-02 RX ORDER — ESOMEPRAZOLE MAGNESIUM 40 MG/1
40 CAPSULE, DELAYED RELEASE ORAL 2 TIMES DAILY
Qty: 60 CAPSULE | Refills: 5 | Status: SHIPPED | OUTPATIENT
Start: 2022-08-02

## 2022-08-02 NOTE — TELEPHONE ENCOUNTER
----- Message from Verónica Maldonado MA sent at 8/2/2022  2:08 PM EDT -----  Subject: Message to Provider    QUESTIONS  Information for Provider? Pt following up states at her last visit   discussed increasing Nexium 40mg to twice daily. She did see this in the   notes on [employer]. She has increased to twice daily request new rx to show   new directions. [address] [place]   [address] Phone? [phone]  Fax? [phone]  ---------------------------------------------------------------------------  --------------  [facility]  [phone]; OK to leave message on voicemail, OK to respond with   electronic message via [employer] portal (only for patients who have   registered [employer] account)  ---------------------------------------------------------------------------  --------------  SCRIPT ANSWERS  Relationship to Patient?  Self

## 2022-08-12 RX ORDER — LEVOTHYROXINE SODIUM 0.07 MG/1
75 TABLET ORAL DAILY
Qty: 90 TABLET | Refills: 1 | Status: SHIPPED | OUTPATIENT
Start: 2022-08-12

## 2022-08-16 NOTE — PROGRESS NOTES
[facility] PRE-ADMISSION TESTING INSTRUCTIONS    The Preadmission Testing patient is instructed accordingly using the following criteria (check applicable):    ARRIVAL INSTRUCTIONS:  [x] Parking the day of Surgery is located in the Main Entrance lot. Upon entering the door, make an immediate right to the surgery reception desk    [x] Bring photo ID and insurance card    [] Bring in a copy of Living will or Durable Power of  papers. [x] Please be sure to arrange for responsible adult to provide transportation to and from the hospital    [x] Please arrange for responsible adult to be with you for the 24 hour period post procedure due to having anesthesia    [] If you awake am of surgery not feeling well or have temperature >100 please call [phone]    GENERAL INSTRUCTIONS:    [x] Nothing by mouth after midnight, including gum, candy, mints or water    [x] You may brush your teeth, but do not swallow any water    [x] Take medications as instructed with 1-2 oz of water    [x] Stop herbal supplements and vitamins 5 days prior to procedure    [] Follow preop dosing of blood thinners per physician instructions    [] Take 1/2 dose of evening insulin, but no insulin after midnight    [] No oral diabetic medications after midnight    [] If diabetic and have low blood sugar or feel symptomatic, take 1-2oz apple juice only    [] Bring inhalers day of surgery    [] Bring C-PAP/ Bi-Pap day of surgery    [] Bring urine specimen day of surgery    [x] Shower or bath with soap, lather and rinse well, AM of Surgery, no lotion, powders or creams to surgical site    [] Follow bowel prep as instructed per surgeon    [x] No tobacco products within 24 hours of surgery     [x] No alcohol or illegal drug use within 24 hours of surgery.     [x] Jewelry, body piercing's, eyeglasses, contact lenses and dentures are not permitted into surgery (bring cases)      [x] Please do not wear any nail polish, make up or hair products on the day of surgery    [x] You can expect a call the business day prior to procedure to notify you if your arrival time changes    [x] If you receive a survey after surgery we would greatly appreciate your comments    [] Parent/guardian of a minor must accompany their child and remain on the premises  the entire time they are under our care     [] Pediatric patients may bring favorite toy, blanket or comfort item with them    [] A caregiver or family member must remain with the patient during their stay if they are mentally handicapped, have dementia, disoriented or unable to use a call light or would be a safety concern if left unattended    [x] Please notify surgeon if you develop any illness between now and time of surgery (cold, cough, sore throat, fever, nausea, vomiting) or any signs of infections  including skin, wounds, and dental.    [x]  The Outpatient Pharmacy is available to fill your prescription here on your day of surgery, ask your preop nurse for details    [] Other instructions    EDUCATIONAL MATERIALS PROVIDED:    [] PAT Preoperative Education Packet/Booklet     [] Medication List    [] Transfusion bracelet applied with instructions    [] Shower with soap, lather and rinse well, and use CHG wipes provided the evening before surgery as instructed    [] Incentive spirometer with instructions

## 2022-08-18 ENCOUNTER — ANESTHESIA (OUTPATIENT)
Dept: ENDOSCOPY | Age: 57
End: 2022-08-18
Payer: COMMERCIAL

## 2022-08-18 ENCOUNTER — ANESTHESIA EVENT (OUTPATIENT)
Dept: ENDOSCOPY | Age: 57
End: 2022-08-18
Payer: COMMERCIAL

## 2022-08-18 ENCOUNTER — HOSPITAL ENCOUNTER (OUTPATIENT)
Age: 57
Setting detail: OUTPATIENT SURGERY
Discharge: HOME | End: 2022-08-18
Attending: SURGERY | Admitting: SURGERY
Payer: COMMERCIAL

## 2022-08-18 VITALS
WEIGHT: 170 LBS | DIASTOLIC BLOOD PRESSURE: 60 MMHG | HEIGHT: 64 IN | HEART RATE: 56 BPM | RESPIRATION RATE: 20 BRPM | BODY MASS INDEX: 29.02 KG/M2 | OXYGEN SATURATION: 95 % | SYSTOLIC BLOOD PRESSURE: 115 MMHG

## 2022-08-18 DIAGNOSIS — R13.10 DYSPHAGIA, UNSPECIFIED TYPE: ICD-10-CM

## 2022-08-18 PROCEDURE — 6360000002 HC RX W HCPCS: Performed by: NURSE ANESTHETIST, CERTIFIED REGISTERED

## 2022-08-18 PROCEDURE — 7100000011 HC PHASE II RECOVERY - ADDTL 15 MIN: Performed by: SURGERY

## 2022-08-18 PROCEDURE — 3700000000 HC ANESTHESIA ATTENDED CARE: Performed by: SURGERY

## 2022-08-18 PROCEDURE — 2709999900 HC NON-CHARGEABLE SUPPLY: Performed by: SURGERY

## 2022-08-18 PROCEDURE — 3609012400 HC EGD TRANSORAL BIOPSY SINGLE/MULTIPLE: Performed by: SURGERY

## 2022-08-18 PROCEDURE — 3700000001 HC ADD 15 MINUTES (ANESTHESIA): Performed by: SURGERY

## 2022-08-18 PROCEDURE — 7100000010 HC PHASE II RECOVERY - FIRST 15 MIN: Performed by: SURGERY

## 2022-08-18 PROCEDURE — 6360000002 HC RX W HCPCS

## 2022-08-18 PROCEDURE — 2580000003 HC RX 258: Performed by: NURSE ANESTHETIST, CERTIFIED REGISTERED

## 2022-08-18 PROCEDURE — 43239 EGD BIOPSY SINGLE/MULTIPLE: CPT | Performed by: SURGERY

## 2022-08-18 PROCEDURE — 88305 TISSUE EXAM BY PATHOLOGIST: CPT

## 2022-08-18 RX ORDER — ONDANSETRON 2 MG/ML
INJECTION INTRAMUSCULAR; INTRAVENOUS
Status: COMPLETED
Start: 2022-08-18 | End: 2022-08-18

## 2022-08-18 RX ORDER — DEXAMETHASONE SODIUM PHOSPHATE 10 MG/ML
INJECTION, SOLUTION INTRAMUSCULAR; INTRAVENOUS
Status: COMPLETED
Start: 2022-08-18 | End: 2022-08-18

## 2022-08-18 RX ORDER — PROPOFOL 10 MG/ML
INJECTION, EMULSION INTRAVENOUS PRN
Status: DISCONTINUED | OUTPATIENT
Start: 2022-08-18 | End: 2022-08-18 | Stop reason: SDUPTHER

## 2022-08-18 RX ORDER — ONDANSETRON 2 MG/ML
4 INJECTION INTRAMUSCULAR; INTRAVENOUS ONCE
Status: COMPLETED | OUTPATIENT
Start: 2022-08-18 | End: 2022-08-18

## 2022-08-18 RX ORDER — SODIUM CHLORIDE 9 MG/ML
INJECTION, SOLUTION INTRAVENOUS CONTINUOUS PRN
Status: DISCONTINUED | OUTPATIENT
Start: 2022-08-18 | End: 2022-08-18 | Stop reason: SDUPTHER

## 2022-08-18 RX ORDER — DEXAMETHASONE SODIUM PHOSPHATE 10 MG/ML
6 INJECTION, SOLUTION INTRAMUSCULAR; INTRAVENOUS ONCE
Status: COMPLETED | OUTPATIENT
Start: 2022-08-18 | End: 2022-08-18

## 2022-08-18 RX ADMIN — DEXAMETHASONE SODIUM PHOSPHATE 6 MG: 10 INJECTION INTRAMUSCULAR; INTRAVENOUS at 11:22

## 2022-08-18 RX ADMIN — DEXAMETHASONE SODIUM PHOSPHATE 6 MG: 10 INJECTION, SOLUTION INTRAMUSCULAR; INTRAVENOUS at 11:22

## 2022-08-18 RX ADMIN — PROPOFOL 150 MG: 10 INJECTION, EMULSION INTRAVENOUS at 12:08

## 2022-08-18 RX ADMIN — ONDANSETRON 4 MG: 2 INJECTION INTRAMUSCULAR; INTRAVENOUS at 11:21

## 2022-08-18 RX ADMIN — SODIUM CHLORIDE: 9 INJECTION, SOLUTION INTRAVENOUS at 11:20

## 2022-08-18 ASSESSMENT — PAIN SCALES - GENERAL
PAINLEVEL_OUTOF10: 0

## 2022-08-18 ASSESSMENT — LIFESTYLE VARIABLES: SMOKING_STATUS: 0

## 2022-08-18 NOTE — ANESTHESIA PRE PROCEDURE
Department of Anesthesiology  Preprocedure Note       Name:  Lisette Diaz   Age:  61 y.o.  :  1965                                          MRN:  [id number]         Date:  2022      Surgeon: Ignacia Bravo):  Trever Masterson MD    Procedure: Procedure(s):  EGD ESOPHAGOGASTRODUODENOSCOPY POSSIBLE DILATION   +++LATEX ALLERGY+++    Medications prior to admission:   Prior to Admission medications    Medication Sig Start Date End Date Taking? Authorizing Provider   levothyroxine (SYNTHROID) 75 MCG tablet Take 1 tablet by mouth in the morning. 22   Saundra Matton, DO   esomeprazole (NEXIUM) 40 MG delayed release capsule Take 1 capsule by mouth in the morning and at bedtime 22   Ely Kern, DO   Ascorbic Acid (FABI-C PO) Take by mouth daily    Historical Provider, MD   FLUoxetine (PROZAC) 10 MG capsule TAKE 1 CAPSULE BY MOUTH 2 TIMES DAILY. 3/4/22   Saundra Cline DO   estradiol (ESTRACE) 0.1 MG/GM vaginal cream Place vaginally as needed  12/15/20   Historical Provider, MD   Cholecalciferol (VITAMIN D-3) 5000 UNITS TABS Take by mouth daily     Historical Provider, MD       Current medications:    No current facility-administered medications for this encounter. Current Outpatient Medications   Medication Sig Dispense Refill    levothyroxine (SYNTHROID) 75 MCG tablet Take 1 tablet by mouth in the morning. 90 tablet 1    esomeprazole (NEXIUM) 40 MG delayed release capsule Take 1 capsule by mouth in the morning and at bedtime 60 capsule 5    Ascorbic Acid (FABI-C PO) Take by mouth daily      FLUoxetine (PROZAC) 10 MG capsule TAKE 1 CAPSULE BY MOUTH 2 TIMES DAILY. 180 capsule 1    estradiol (ESTRACE) 0.1 MG/GM vaginal cream Place vaginally as needed       Cholecalciferol (VITAMIN D-3) 5000 UNITS TABS Take by mouth daily          Allergies:     Allergies   Allergen Reactions    Latex Hives and Rash    Prednisone        Problem List:    Patient Active Problem List   Diagnosis Code    Difficult intubation T88. 4XXA    Latex allergy, contact dermatitis L25.3    Fracture, [place], left, closed S52.532A    Painful orthopaedic hardware ([address] T84.84XA    Hypothyroidism E03.9    Gastroesophageal reflux disease K21.9    Depression F32. A    Celiac disease K90.0    Weight gain R63.5    Mass of left wrist R22.32    Hyperlipidemia E78.5    Hypoglycemia E16.2    Impaired fasting glucose R73.01    Chronic nonintractable headache R51.9, G89.29    Balance disorder R26.89    Chronic maxillary sinusitis J32.0    Ganglion cyst of finger of right hand M67.441    Anxiety F41.9    TMJ (temporomandibular joint syndrome) M26.609    Chronic left shoulder pain M25.512, G89.29    Chronic pain of left knee M25.562, G89.29    Contusion of left knee and lower leg S80.02XA, S80.12XA    Choking T17.308A       Past Medical History:        Diagnosis Date    Anemia     Anxiety     Celiac disease     Difficult intubation     Intubated successfully 1/2009 with Glidescope #3, 7.0 ETT.  Eating disorder 10 years ago    anorexia, Dr. Analilia Hare monitoring.     GERD (gastroesophageal reflux disease)     History of blood transfusion 1993    Hypothyroidism     Latex allergy, contact dermatitis     PONV (postoperative nausea and vomiting)     TMJ (dislocation of temporomandibular joint)     Vitamin D deficiency     follows with Dr. Analilia Hare       Past Surgical History:        Procedure Laterality Date    COLONOSCOPY  1/2015    DILATION AND CURETTAGE OF UTERUS  2007, 2006    HYSTERECTOMY (CERVIX STATUS UNKNOWN)  2008   [facility]    x 4    OTHER SURGICAL HISTORY  10/8/15    bilateral tmj arthrodesis/steroid injection    UPPER GASTROINTESTINAL ENDOSCOPY  12/17/2014    UPPER GASTROINTESTINAL ENDOSCOPY  04/12/2017    UPPER GASTROINTESTINAL ENDOSCOPY N/A 7/13/2018    EGD BIOPSY performed by Viviane Glass MD at [facility] Left 1/16/2019    LEFT WRIST OPEN REDUCTION INTERNAL FIXATION performed by Rodriguez Rodriguez DO at [address] SURGERY Left 7/19/2019    LEFT WRIST  REMOVAL OF HARDWARE performed by Rodriguez Rodriguez DO at [address] [employer] History:    Social History     Tobacco Use    Smoking status: Never    Smokeless tobacco: Never   Substance Use Topics    Alcohol use: Yes     Comment: social                                Counseling given: Not Answered      Vital Signs (Current):   Vitals:    08/16/22 1636   Weight: 170 lb (77.1 kg)   Height: 5' 4\" (1.626 m)                                              BP Readings from Last 3 Encounters:   07/14/22 129/65   06/17/22 114/70   04/08/22 110/70       NPO Status:                                                                                 BMI:   Wt Readings from Last 3 Encounters:   07/14/22 170 lb (77.1 kg)   06/17/22 170 lb (77.1 kg)   05/05/22 170 lb (77.1 kg)     Body mass index is 29.18 kg/m². CBC:   Lab Results   Component Value Date/Time    WBC 3.8 04/01/2022 09:03 AM    RBC 3.96 04/01/2022 09:03 AM    HGB 12.7 04/01/2022 09:03 AM    HCT 37.4 04/01/2022 09:03 AM    MCV 94.4 04/01/2022 09:03 AM    RDW 12.1 04/01/2022 09:03 AM     04/01/2022 09:03 AM       CMP:   Lab Results   Component Value Date/Time     04/01/2022 09:03 AM    K 4.6 04/01/2022 09:03 AM     04/01/2022 09:03 AM    CO2 28 04/01/2022 09:03 AM    BUN 13 04/01/2022 09:03 AM    CREATININE 0.8 04/01/2022 09:03 AM    GFRAA >60 04/01/2022 09:03 AM    LABGLOM >60 04/01/2022 09:03 AM    GLUCOSE 94 04/01/2022 09:03 AM    GLUCOSE 96 03/31/2012 12:04 PM    PROT 7.3 04/01/2022 09:03 AM    CALCIUM 9.9 04/01/2022 09:03 AM    BILITOT 0.5 04/01/2022 09:03 AM    ALKPHOS 60 04/01/2022 09:03 AM    AST 21 04/01/2022 09:03 AM    ALT 11 04/01/2022 09:03 AM       POC Tests: No results for input(s): POCGLU, POCNA, POCK, POCCL, POCBUN, POCHEMO, POCHCT in the last 72 hours.     Coags:   Lab Results Component Value Date/Time    PROTIME 11.9 01/16/2019 10:45 AM    INR 1.0 01/16/2019 10:45 AM    APTT 27.9 01/16/2019 10:45 AM       HCG (If Applicable): No results found for: PREGTESTUR, PREGSERUM, HCG, HCGQUANT     ABGs: No results found for: PHART, PO2ART, GQU8RLC, VFB5RWO, BEART, T5UACJOY     Type & Screen (If Applicable):  No results found for: LABABO, LABRH    Drug/Infectious Status (If Applicable):  No results found for: HIV, HEPCAB    COVID-19 Screening (If Applicable): No results found for: COVID19        Anesthesia Evaluation  Patient summary reviewed and Nursing notes reviewed   history of anesthetic complications: PONV and history of difficult intubation. Airway: Mallampati: IV  TM distance: <3 FB   Neck ROM: full  Mouth opening: < 3 FB   Dental:          Pulmonary:normal exam        (-) COPD, asthma, sleep apnea and not a current smoker                          ROS comment: Chronic maxillary sinusitis   Cardiovascular:  Exercise tolerance: good (>4 METS),   (+) hyperlipidemia      ECG reviewed  Rhythm: regular  Rate: normal           Beta Blocker:  Not on Beta Blocker      ROS comment: EKG 2019:  Normal sinus rhythm  Low voltage QRS  Borderline ECG  No previous ECGs available     Neuro/Psych:   (+) headaches: migraine headaches, psychiatric history:depression/anxiety    (-) seizures, TIA and CVA            ROS comment: Balance disorder  TMJ s/p surgical correction:  Metal implant on the R and no joint on the L. Significantly decreased interincisor distance and retrognathia. Known difficult airway. GI/Hepatic/Renal:   (+) GERD: no interval change,          ROS comment: Celiac disease. Endo/Other:    (+) hypothyroidism::., .    Diabetes: Impaired fasting blood glucose. Pt had no PAT visit        ROS comment: History of anorexia  History of PRBC transfusion Abdominal:         (-) obese       Vascular: negative vascular ROS. - DVT and PE.       Other Findings:           Anesthesia Plan      MAC     ASA 3     (Decadron and Zofran given in pre-operative holding)  Induction: intravenous. Anesthetic plan and risks discussed with patient. Plan discussed with CRNA. NOTE:  Known TMJ s/p repair with underlying difficult airway. Decreased interincisor distance with retrognathia. Take caution with insertion of the bite block or omit all together.   Patient seen and evaluatd LADI Santo - CRNA      Roldan Bedoya DO   8/18/2022

## 2022-08-18 NOTE — ANESTHESIA POSTPROCEDURE EVALUATION
Department of Anesthesiology  Postprocedure Note    Patient: Maine Vitale  MRN: [id number]  YOB: 1965  Date of evaluation: 8/18/2022      Procedure Summary     Date: 08/18/22 Room / Location: [facility] / [facility]    Anesthesia Start: 4840 Anesthesia Stop:     Procedure: EGD BIOPSY Diagnosis:       Dysphagia, unspecified type      (Dysphagia, unspecified type [R13.10])    Surgeons: Yosef Jamison MD Responsible Provider: Anson Corley DO    Anesthesia Type: MAC ASA Status: 3          Anesthesia Type: No value filed. Soni Phase I: Soni Score: 10    Soni Phase II:        Anesthesia Post Evaluation    Patient location during evaluation: PACU  Patient participation: complete - patient participated  Level of consciousness: awake  Pain score: 1  Airway patency: patent  Nausea & Vomiting: no nausea and no vomiting  Complications: no  Cardiovascular status: hemodynamically stable  Respiratory status: acceptable  Hydration status: euvolemic  Comments: Seen and examined. Progressing as expected. No questions or concerns.

## 2022-08-18 NOTE — H&P
[address] [facility] Surgery Clinic Note     Assessment/Plan:        Diagnosis Orders   1. Pharyngeal dysphagia FL MODIFIED BARIUM SWALLOW W VIDEO     US HEAD NECK SOFT TISSUE THYROID     We will plan for barium swallow and ultrasound. We will plan for EGD with dilation. Temporally related to TMJ surgery                Return for EGD, Results. Chief Complaint   Patient presents with    New Patient       reflux         PCP: Barbara Lopez DO     HPI: Kimi Martínez is a 61 y.o. female who presents in consultation for pharyngeal dysphagia. She says it began a year ago after she had reconstruction for severe TMJ. She says she had [facility]. Matthew Sullivan who told her it was unrelated. She says ever since the surgery she has had increased coughing and choking with food and water. She also loses her voice she states. She is on Nexium. She has a history of celiac disease. She says she is on a gluten-free diet and her symptoms are controlled with that. She denies any melena. 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 Past Medical History        Past Medical History:   Diagnosis Date    Anemia      Anxiety      Celiac disease      Difficult intubation       Intubated successfully 1/2009 with Glidescope #3, 7.0 ETT. Eating disorder 10 years ago     anorexia, Dr. Stephanie Roblero monitoring. History of blood transfusion 1993.     Hypoglycemia      Hypothyroidism      Latex allergy, contact dermatitis      PONV (postoperative nausea and vomiting)      TMJ (dislocation of temporomandibular joint)      Vitamin D deficiency       follows with Dr. Stephanie Roblero            Past Surgical History         Past Surgical History:   Procedure Laterality Date    COLONOSCOPY   1/2015    DILATION AND CURETTAGE OF UTERUS   2007, 2006    HYSTERECTOMY (CERVIX STATUS UNKNOWN)   2008    [facility]     x 4    OTHER SURGICAL HISTORY   10/8/15 bilateral tmj arthrodesis/steroid injection    UPPER GASTROINTESTINAL ENDOSCOPY   12/17/2014    UPPER GASTROINTESTINAL ENDOSCOPY   04/12/2017    UPPER GASTROINTESTINAL ENDOSCOPY N/A 7/13/2018     EGD BIOPSY performed by Viviane Glass MD at [address] [facility] Left 1/16/2019     LEFT WRIST OPEN REDUCTION INTERNAL FIXATION performed by Vaishali Coffman DO at [address] Left 7/19/2019     LEFT WRIST  REMOVAL OF HARDWARE performed by Vaishali Coffman DO at [id number] [place] [address] Medications           Prior to Admission medications   Medication Sig Start Date End Date Taking? Authorizing Provider   esomeprazole (NEXIUM) 40 MG delayed release capsule TAKE ONE CAPSULE BY MOUTH EVERY MORNING BEFORE BREAKFAST 6/1/22   Yes Tonja Kern DO   Ascorbic Acid (FABI-C PO) Take by mouth daily     Yes Historical Provider, MD   FLUoxetine (PROZAC) 10 MG capsule TAKE 1 CAPSULE BY MOUTH 2 TIMES DAILY.  3/4/22   Yes Tonja Kern DO   levothyroxine (SYNTHROID) 75 MCG tablet TAKE 1 TABLET BY MOUTH ONE TIME A DAY 2/7/22   Yes Tonja Kern DO   estradiol (ESTRACE) 0.1 MG/GM vaginal cream Place vaginally as needed 12/15/20   Yes Historical Provider, MD   Cholecalciferol (VITAMIN D-3) 5000 UNITS TABS Take by mouth daily     Yes Historical Provider, MD                 Allergies   Allergen Reactions    Latex      Prednisone           Social History   Social History            Socioeconomic History    Marital status:        Spouse name: None    Number of children: None    Years of education: None    Highest education level: None   Tobacco Use    Smoking status: Never    Smokeless tobacco: Never   Substance and Sexual Activity    Alcohol use: Yes       Comment: social    Drug use: No            Family History         Family History   Problem Relation Age of Onset    High Blood Pressure Mother      High Cholesterol Mother      Diabetes Father      Obesity Father Thyroid Disease Father      Stroke Father      High Cholesterol Father      High Blood Pressure Father      Heart Disease Father           CHF    Kidney Disease Father      Heart Attack Father      Thyroid Disease Sister      Celiac Disease Sister      Thyroid Disease Brother      Diabetes Brother      High Blood Pressure Brother      Heart Disease Maternal Grandmother      High Blood Pressure Maternal Grandmother      Diabetes Maternal Grandmother      Diabetes Maternal Grandfather      High Blood Pressure Maternal Grandfather      Thyroid Disease Paternal Grandmother      Heart Disease Paternal Grandfather      Diabetes Maternal Aunt      Diabetes Maternal Uncle      Breast Cancer Maternal Aunt 76    Breast Cancer Maternal Cousin 48    Breast Cancer Maternal Cousin 48    Ovarian Cancer Maternal Cousin      Breast Cancer Niece 36            Review of Systems   All other systems reviewed and are negative. Objective:  Vitals       Vitals:     07/14/22 1603   BP: 129/65   Pulse: 84   Resp: 18   Temp: 97.3 °F (36.3 °C)   TempSrc: Temporal   SpO2: 96%   Weight: 170 lb (77.1 kg)   Height: 5' 4\" (1.626 m)            Physical Exam  Constitutional:       General: She is not in acute distress. Appearance: She is not diaphoretic. HENT:     Head: Normocephalic and atraumatic. Eyes:     General:         Right eye: No discharge. Left eye: No discharge. Neck:     Trachea: No tracheal deviation. Comments: No mass noted  Cardiovascular:     Rate and Rhythm: Normal rate. Pulmonary:     Effort: Pulmonary effort is normal. No respiratory distress. Abdominal:     General: There is no distension. Palpations: Abdomen is soft. Tenderness: There is no abdominal tenderness. There is no guarding or rebound. Musculoskeletal:     Cervical back: Neck supple. Skin:     General: Skin is warm and dry. Neurological:     Mental Status: She is alert and oriented to person, place, and time. Hubert Limon MD        NOTE: This report, in part or full,may have been transcribed using voice recognition software. Every effort was made to ensure accuracy; however, inadvertent computerized transcription errors may be present. Please excuse any transcriptional grammatical or spelling errors that may have escaped my editorial review.      CC: Sam Kern, DO

## 2022-08-18 NOTE — OP NOTE
EGD Op Note    DATE OF PROCEDURE: 8/18/2022     SURGEON: Marialuisa Walters MD    PREOPERATIVE DIAGNOSIS: Pharyngeal dysphagia    POSTOPERATIVE DIAGNOSIS: Same, minimal gastritis, no intrinsic esophageal stricture    OPERATION: Procedure(s):  EGD BIOPSY    ANESTHESIA: Local monitored anesthesia. ESTIMATED BLOOD LOSS: minimal    COMPLICATIONS: None. SPECIMENS:    ID Type Source Tests Collected by Time Destination   A : BIOPSY ANTRUM Tissue Stomach SURGICAL PATHOLOGY Marialuisa Walters MD 8/18/2022 1210        HISTORY: The patient is a 61y.o. year old female with history of above preop diagnosis. I recommended esophagogastroduodenoscopy with possible biopsy and I explained the risk, benefits, expected outcome, and alternatives to the procedure. Risks included but are not limited to bleeding, infection, respiratory distress, hypotension, and perforation of the esophagus, stomach, or duodenum. Patient understands and is in agreement. PROCEDURE: The patient was given IV conscious sedation per anesthesia. The patient was given supplemental oxygen by nasal cannula. The gastroscope was inserted orally and advanced under direct vision through the esophagus, through the stomach, through the pylorus, and into the duodenum. Findings:  Duodenum: normal    Stomach: Minimal gastritis status post biopsy    Esophagus: There is a small sliding hiatal hernia. There is no intrinsic esophageal lesion or stricture to be a source of her dysphagia    Larynx: not examined    The scope was removed and the patient tolerated the procedure well. IMPRESSION/PLAN:   Suspect her pharyngeal dysphagia related to her TMJ surgery. Ultrasound the neck was negative for any lesions, modified barium swallow showed mild penetration but no aspiration with thin barium using a straw but otherwise negative. She has normal swallow function.   If persist can consider esophagram although low suspicion of any source in the esophagus      Janett Turpin Aline Maloney MD  08/18/22  12:23 PM

## 2022-08-19 ENCOUNTER — OFFICE VISIT (OUTPATIENT)
Dept: PRIMARY CARE CLINIC | Age: 57
End: 2022-08-19
Payer: COMMERCIAL

## 2022-08-19 VITALS
HEIGHT: 64 IN | SYSTOLIC BLOOD PRESSURE: 128 MMHG | DIASTOLIC BLOOD PRESSURE: 70 MMHG | TEMPERATURE: 97.8 F | HEART RATE: 77 BPM | OXYGEN SATURATION: 99 % | BODY MASS INDEX: 29.53 KG/M2 | WEIGHT: 173 LBS

## 2022-08-19 DIAGNOSIS — F41.9 ANXIETY: ICD-10-CM

## 2022-08-19 DIAGNOSIS — K21.9 GASTROESOPHAGEAL REFLUX DISEASE, UNSPECIFIED WHETHER ESOPHAGITIS PRESENT: Primary | ICD-10-CM

## 2022-08-19 DIAGNOSIS — E78.5 HYPERLIPIDEMIA, UNSPECIFIED HYPERLIPIDEMIA TYPE: ICD-10-CM

## 2022-08-19 DIAGNOSIS — E03.9 HYPOTHYROIDISM, UNSPECIFIED TYPE: ICD-10-CM

## 2022-08-19 PROCEDURE — 99214 OFFICE O/P EST MOD 30 MIN: CPT | Performed by: FAMILY MEDICINE

## 2022-08-19 SDOH — ECONOMIC STABILITY: FOOD INSECURITY: WITHIN THE PAST 12 MONTHS, THE FOOD YOU BOUGHT JUST DIDN'T LAST AND YOU DIDN'T HAVE MONEY TO GET MORE.: NEVER TRUE

## 2022-08-19 SDOH — ECONOMIC STABILITY: FOOD INSECURITY: WITHIN THE PAST 12 MONTHS, YOU WORRIED THAT YOUR FOOD WOULD RUN OUT BEFORE YOU GOT MONEY TO BUY MORE.: NEVER TRUE

## 2022-08-19 ASSESSMENT — SOCIAL DETERMINANTS OF HEALTH (SDOH): HOW HARD IS IT FOR YOU TO PAY FOR THE VERY BASICS LIKE FOOD, HOUSING, MEDICAL CARE, AND HEATING?: NOT HARD AT ALL

## 2022-08-19 NOTE — PROGRESS NOTES
22     Megha Rodas    : 1965 Sex: female   Age: 61 y.o. Chief Complaint   Patient presents with    Hypothyroidism       Prior to Admission medications    Medication Sig Start Date End Date Taking? Authorizing Provider   levothyroxine (SYNTHROID) 75 MCG tablet Take 1 tablet by mouth in the morning. 22  Yes Sharmila Kern DO   esomeprazole (NEXIUM) 40 MG delayed release capsule Take 1 capsule by mouth in the morning and at bedtime 22  Yes Sharmila Kern DO   Ascorbic Acid (FABI-C PO) Take by mouth daily   Yes Historical Provider, MD   FLUoxetine (PROZAC) 10 MG capsule TAKE 1 CAPSULE BY MOUTH 2 TIMES DAILY. 3/4/22  Yes Sharmila Kern DO   estradiol (ESTRACE) 0.1 MG/GM vaginal cream Place vaginally as needed  12/15/20  Yes Historical Provider, MD   Cholecalciferol (VITAMIN D-3) 5000 UNITS TABS Take by mouth daily    Yes Historical Provider, MD          HPI: Patient evaluated today reflux disease hypothyroid hyperlipidemia anxiety all of which have been stable. Medications reviewed well-tolerated continue as prescribed. Review of Systems   Constitutional: Negative. HENT: Negative. Eyes: Negative. Respiratory: Negative. Gastrointestinal: Negative. Endocrine: Negative. Genitourinary: Negative. Musculoskeletal: Negative. Skin: Negative. Allergic/Immunologic: Negative. Neurological: Negative. Hematological: Negative. Psychiatric/Behavioral: Negative. Current Outpatient Medications:     levothyroxine (SYNTHROID) 75 MCG tablet, Take 1 tablet by mouth in the morning., Disp: 90 tablet, Rfl: 1    esomeprazole (NEXIUM) 40 MG delayed release capsule, Take 1 capsule by mouth in the morning and at bedtime, Disp: 60 capsule, Rfl: 5    Ascorbic Acid (FABI-C PO), Take by mouth daily, Disp: , Rfl:     FLUoxetine (PROZAC) 10 MG capsule, TAKE 1 CAPSULE BY MOUTH 2 TIMES DAILY. , Disp: 180 capsule, Rfl: 1    estradiol (ESTRACE) 0.1 MG/GM Blood Pressure Maternal Grandfather     Thyroid Disease Paternal Grandmother     Heart Disease Paternal Grandfather     Diabetes Maternal Aunt     Diabetes Maternal Uncle     Breast Cancer Maternal Aunt 76    Breast Cancer Maternal Cousin 48    Breast Cancer Maternal Cousin 48    Ovarian Cancer Maternal Cousin     Breast Cancer Niece 36     Past Medical History:   Diagnosis Date    Anemia     Anxiety     Celiac disease     Difficult intubation     Intubated successfully 1/2009 with Glidescope #3, 7.0 ETT. Eating disorder 10 years ago    dimitris, Dr. Belle Spencer monitoring. GERD (gastroesophageal reflux disease)     History of blood transfusion 1993    Hypothyroidism     Latex allergy, contact dermatitis     PONV (postoperative nausea and vomiting)     TMJ (dislocation of temporomandibular joint)     Vitamin D deficiency     follows with Dr. Curtis Palacios:    08/19/22 1027   BP: 128/70   Pulse: 77   Temp: 97.8 °F (36.6 °C)   SpO2: 99%   Weight: 173 lb (78.5 kg)   Height: 5' 4\" (1.626 m)     BP Readings from Last 3 Encounters:   08/19/22 128/70   08/18/22 115/60   07/14/22 129/65    110/62    Physical Exam  Vitals and nursing note reviewed. Constitutional:       Appearance: She is well-developed. HENT:      Head: Normocephalic. Right Ear: External ear normal.      Left Ear: External ear normal.      Nose: Nose normal.   Eyes:      Conjunctiva/sclera: Conjunctivae normal.      Pupils: Pupils are equal, round, and reactive to light. Cardiovascular:      Rate and Rhythm: Normal rate. Pulmonary:      Breath sounds: Normal breath sounds. Abdominal:      General: Bowel sounds are normal.      Palpations: Abdomen is soft. Musculoskeletal:         General: Normal range of motion. Cervical back: Normal range of motion and neck supple. Skin:     General: Skin is warm and dry. Neurological:      Mental Status: She is alert and oriented to person, place, and time.    Psychiatric: Behavior: Behavior normal.      Today's vitals physical examination stable. Medications as prescribed. Follow-up visit with me 3 months and sooner if problems. Plan Per Assessment:  Latosha Boswell was seen today for hypothyroidism. Diagnoses and all orders for this visit:    Gastroesophageal reflux disease, unspecified whether esophagitis present    Hypothyroidism, unspecified type  -     CBC with Auto Differential; Future  -     Comprehensive Metabolic Panel; Future  -     Lipid Panel; Future  -     T4; Future  -     TSH; Future    Hyperlipidemia, unspecified hyperlipidemia type  -     CBC with Auto Differential; Future  -     Comprehensive Metabolic Panel; Future  -     Lipid Panel; Future  -     T4; Future  -     TSH; Future    Anxiety  -     CBC with Auto Differential; Future  -     Comprehensive Metabolic Panel; Future  -     Lipid Panel; Future  -     T4; Future  -     TSH; Future          Return in about 3 months (around 11/19/2022). Juan Alberto Lesser, DO    Note was generated with the assistance of voice recognition software. Document was reviewed however may contain grammatical errors.

## 2022-08-26 ENCOUNTER — TELEPHONE (OUTPATIENT)
Dept: SURGERY | Age: 57
End: 2022-08-26

## 2022-08-26 DIAGNOSIS — A04.8 H. PYLORI INFECTION: Primary | ICD-10-CM

## 2022-08-26 RX ORDER — CLARITHROMYCIN 500 MG/1
500 TABLET, COATED ORAL 2 TIMES DAILY
Qty: 28 TABLET | Refills: 0 | Status: SHIPPED | OUTPATIENT
Start: 2022-08-26 | End: 2022-09-09

## 2022-08-26 RX ORDER — AMOXICILLIN 500 MG/1
1000 CAPSULE ORAL 2 TIMES DAILY
Qty: 56 CAPSULE | Refills: 0 | Status: SHIPPED | OUTPATIENT
Start: 2022-08-26 | End: 2022-09-09

## 2022-08-26 NOTE — TELEPHONE ENCOUNTER
Patient called regarding her pathology. Patient was wondering if she needed antibiotics.      Electronically signed by Bethany Cost on 8/26/2022 at 2:28 PM

## 2022-09-22 ENCOUNTER — TELEPHONE (OUTPATIENT)
Dept: SURGERY | Age: 57
End: 2022-09-22

## 2022-09-22 NOTE — TELEPHONE ENCOUNTER
Fasting labs in 6 months    Neurology #     ORTHOPEDICS [phone]       Patient called and stated that she finished her antibiotic on 09-10-22 for h pylori infection. MA mailed out script for patient to have a h pylori breath test the week of 10-10-10-22 at [facility] on [address] [place] [address].   Electronically signed by Rodney Rodriguez MA on 9/22/2022 at 10:22 AM

## 2022-10-13 RX ORDER — FLUOXETINE 10 MG/1
CAPSULE ORAL
Qty: 180 CAPSULE | Refills: 1 | Status: SHIPPED | OUTPATIENT
Start: 2022-10-13

## 2022-11-23 ENCOUNTER — HOSPITAL ENCOUNTER (OUTPATIENT)
Dept: GENERAL RADIOLOGY | Age: 57
Discharge: HOME | End: 2022-11-25
Payer: COMMERCIAL

## 2022-11-23 ENCOUNTER — APPOINTMENT (OUTPATIENT)
Dept: GENERAL RADIOLOGY | Age: 57
End: 2022-11-23
Payer: COMMERCIAL

## 2022-11-23 VITALS — BODY MASS INDEX: 27.31 KG/M2 | WEIGHT: 160 LBS | HEIGHT: 64 IN

## 2022-11-23 DIAGNOSIS — Z12.31 VISIT FOR SCREENING MAMMOGRAM: ICD-10-CM

## 2022-11-23 DIAGNOSIS — R92.2 DENSE BREASTS: ICD-10-CM

## 2022-11-23 PROCEDURE — 77067 SCR MAMMO BI INCL CAD: CPT

## 2022-11-23 PROCEDURE — 76641 ULTRASOUND BREAST COMPLETE: CPT

## 2022-12-01 ENCOUNTER — HOSPITAL ENCOUNTER (OUTPATIENT)
Age: 57
Discharge: HOME | End: 2022-12-01
Payer: COMMERCIAL

## 2022-12-01 DIAGNOSIS — E03.9 HYPOTHYROIDISM, UNSPECIFIED TYPE: ICD-10-CM

## 2022-12-01 DIAGNOSIS — F41.9 ANXIETY: ICD-10-CM

## 2022-12-01 DIAGNOSIS — E78.5 HYPERLIPIDEMIA, UNSPECIFIED HYPERLIPIDEMIA TYPE: ICD-10-CM

## 2022-12-01 LAB
ALBUMIN SERPL-MCNC: 4.4 G/DL (ref 3.5–5.2)
ALP BLD-CCNC: 54 U/L (ref 35–104)
ALT SERPL-CCNC: 10 U/L (ref 0–32)
ANION GAP SERPL CALCULATED.3IONS-SCNC: 9 MMOL/L (ref 7–16)
AST SERPL-CCNC: 20 U/L (ref 0–31)
BASOPHILS ABSOLUTE: 0.03 E9/L (ref 0–0.2)
BASOPHILS RELATIVE PERCENT: 0.8 % (ref 0–2)
BILIRUB SERPL-MCNC: 0.4 MG/DL (ref 0–1.2)
BUN BLDV-MCNC: 14 MG/DL (ref 6–20)
CALCIUM SERPL-MCNC: 10 MG/DL (ref 8.6–10.2)
CHLORIDE BLD-SCNC: 102 MMOL/L (ref 98–107)
CHOLESTEROL, TOTAL: 221 MG/DL (ref 0–199)
CO2: 27 MMOL/L (ref 22–29)
CREAT SERPL-MCNC: 1 MG/DL (ref 0.5–1)
EOSINOPHILS ABSOLUTE: 0.08 E9/L (ref 0.05–0.5)
EOSINOPHILS RELATIVE PERCENT: 2.2 % (ref 0–6)
GFR SERPL CREATININE-BSD FRML MDRD: >60 ML/MIN/1.73
GLUCOSE BLD-MCNC: 100 MG/DL (ref 74–99)
HCT VFR BLD CALC: 36.8 % (ref 34–48)
HDLC SERPL-MCNC: 72 MG/DL
HEMOGLOBIN: 12.4 G/DL (ref 11.5–15.5)
IMMATURE GRANULOCYTES #: 0.01 E9/L
IMMATURE GRANULOCYTES %: 0.3 % (ref 0–5)
LDL CHOLESTEROL CALCULATED: 138 MG/DL (ref 0–99)
LYMPHOCYTES ABSOLUTE: 0.8 E9/L (ref 1.5–4)
LYMPHOCYTES RELATIVE PERCENT: 22 % (ref 20–42)
MCH RBC QN AUTO: 31.9 PG (ref 26–35)
MCHC RBC AUTO-ENTMCNC: 33.7 % (ref 32–34.5)
MCV RBC AUTO: 94.6 FL (ref 80–99.9)
MONOCYTES ABSOLUTE: 0.33 E9/L (ref 0.1–0.95)
MONOCYTES RELATIVE PERCENT: 9.1 % (ref 2–12)
NEUTROPHILS ABSOLUTE: 2.38 E9/L (ref 1.8–7.3)
NEUTROPHILS RELATIVE PERCENT: 65.6 % (ref 43–80)
PDW BLD-RTO: 12.1 FL (ref 11.5–15)
PLATELET # BLD: 237 E9/L (ref 130–450)
PMV BLD AUTO: 8.8 FL (ref 7–12)
POTASSIUM SERPL-SCNC: 4.4 MMOL/L (ref 3.5–5)
RBC # BLD: 3.89 E12/L (ref 3.5–5.5)
SODIUM BLD-SCNC: 138 MMOL/L (ref 132–146)
T4 TOTAL: 9.1 MCG/DL (ref 4.5–11.7)
TOTAL PROTEIN: 7.3 G/DL (ref 6.4–8.3)
TRIGL SERPL-MCNC: 54 MG/DL (ref 0–149)
TSH SERPL DL<=0.05 MIU/L-ACNC: 1.4 UIU/ML (ref 0.27–4.2)
VLDLC SERPL CALC-MCNC: 11 MG/DL
WBC # BLD: 3.6 E9/L (ref 4.5–11.5)

## 2022-12-01 PROCEDURE — 80053 COMPREHEN METABOLIC PANEL: CPT

## 2022-12-01 PROCEDURE — 84443 ASSAY THYROID STIM HORMONE: CPT

## 2022-12-01 PROCEDURE — 84436 ASSAY OF TOTAL THYROXINE: CPT

## 2022-12-01 PROCEDURE — 80061 LIPID PANEL: CPT

## 2022-12-01 PROCEDURE — 36415 COLL VENOUS BLD VENIPUNCTURE: CPT

## 2022-12-01 PROCEDURE — 85025 COMPLETE CBC W/AUTO DIFF WBC: CPT

## 2022-12-02 ENCOUNTER — OFFICE VISIT (OUTPATIENT)
Dept: PRIMARY CARE CLINIC | Age: 57
End: 2022-12-02
Payer: COMMERCIAL

## 2022-12-02 VITALS
SYSTOLIC BLOOD PRESSURE: 116 MMHG | WEIGHT: 171 LBS | BODY MASS INDEX: 29.19 KG/M2 | HEART RATE: 73 BPM | TEMPERATURE: 97.5 F | HEIGHT: 64 IN | OXYGEN SATURATION: 99 % | DIASTOLIC BLOOD PRESSURE: 72 MMHG

## 2022-12-02 DIAGNOSIS — E78.5 HYPERLIPIDEMIA, UNSPECIFIED HYPERLIPIDEMIA TYPE: ICD-10-CM

## 2022-12-02 DIAGNOSIS — E03.9 HYPOTHYROIDISM, UNSPECIFIED TYPE: Primary | ICD-10-CM

## 2022-12-02 DIAGNOSIS — K21.9 GASTROESOPHAGEAL REFLUX DISEASE, UNSPECIFIED WHETHER ESOPHAGITIS PRESENT: ICD-10-CM

## 2022-12-02 DIAGNOSIS — F41.9 ANXIETY: ICD-10-CM

## 2022-12-02 DIAGNOSIS — F32.89 OTHER DEPRESSION: ICD-10-CM

## 2022-12-02 PROCEDURE — 99214 OFFICE O/P EST MOD 30 MIN: CPT | Performed by: FAMILY MEDICINE

## 2022-12-02 NOTE — PROGRESS NOTES
22     Nadia Byrd    : 1965 Sex: female   Age: 61 y.o. Chief Complaint   Patient presents with    Discuss Labs       Prior to Admission medications    Medication Sig Start Date End Date Taking? Authorizing Provider   FLUoxetine (PROZAC) 10 MG capsule TAKE 1 CAPSULE BY MOUTH 2 TIMES DAILY. 10/13/22  Yes Kamaljit Kern, DO   levothyroxine (SYNTHROID) 75 MCG tablet Take 1 tablet by mouth in the morning. 22  Yes Kamaljit Kern, DO   esomeprazole (NEXIUM) 40 MG delayed release capsule Take 1 capsule by mouth in the morning and at bedtime 22  Yes Kamaljit Kern, DO   Ascorbic Acid (FABI-C PO) Take by mouth daily   Yes Historical Provider, MD   estradiol (ESTRACE) 0.1 MG/GM vaginal cream Place vaginally as needed  12/15/20  Yes Historical Provider, MD   Cholecalciferol (VITAMIN D-3) 5000 UNITS TABS Take by mouth daily    Yes Historical Provider, MD          HPI: Evaluated today hypothyroidism depression reflux disease hyperlipidemia anxiety all of which is very well controlled currently. Grieving process recent loss of her mom and she is doing very well emotional support today. Systems review is stable. Review of Systems   Constitutional: Negative. HENT: Negative. Eyes: Negative. Respiratory: Negative. Gastrointestinal: Negative. Endocrine: Negative. Genitourinary: Negative. Musculoskeletal: Negative. Skin: Negative. Allergic/Immunologic: Negative. Neurological: Negative. Hematological: Negative. Psychiatric/Behavioral: Negative. Current Outpatient Medications:     FLUoxetine (PROZAC) 10 MG capsule, TAKE 1 CAPSULE BY MOUTH 2 TIMES DAILY. , Disp: 180 capsule, Rfl: 1    levothyroxine (SYNTHROID) 75 MCG tablet, Take 1 tablet by mouth in the morning., Disp: 90 tablet, Rfl: 1    esomeprazole (NEXIUM) 40 MG delayed release capsule, Take 1 capsule by mouth in the morning and at bedtime, Disp: 60 capsule, Rfl: 5    Ascorbic Acid (FABI-C PO), Take by mouth daily, Disp: , Rfl:     estradiol (ESTRACE) 0.1 MG/GM vaginal cream, Place vaginally as needed , Disp: , Rfl:     Cholecalciferol (VITAMIN D-3) 5000 UNITS TABS, Take by mouth daily , Disp: , Rfl:     Allergies   Allergen Reactions    Latex Hives and Rash    Prednisone        Social History     Tobacco Use    Smoking status: Never    Smokeless tobacco: Never   Vaping Use    Vaping Use: Never used   Substance Use Topics    Alcohol use: Yes     Comment: social    Drug use: No      Past Surgical History:   Procedure Laterality Date    COLONOSCOPY  1/2015    DILATION AND CURETTAGE OF UTERUS  2007, 2006    HYSTERECTOMY (CERVIX STATUS UNKNOWN)  2008    MANDIBLE SURGERY  1997, [facility], [address] [facility], 1990    x 4    OTHER SURGICAL HISTORY  10/8/15    bilateral tmj arthrodesis/steroid injection    UPPER GASTROINTESTINAL ENDOSCOPY  12/17/2014    UPPER GASTROINTESTINAL ENDOSCOPY  04/12/2017    UPPER GASTROINTESTINAL ENDOSCOPY N/A 7/13/2018    EGD BIOPSY performed by Rashard Saavedra MD at [place] [address] N/A 8/18/2022    EGD BIOPSY performed by Kim Whittington MD at [address] [facility] Left 1/16/2019    LEFT WRIST OPEN REDUCTION INTERNAL FIXATION performed by Danay Treadwell DO at [address] Left 7/19/2019    LEFT WRIST  REMOVAL OF HARDWARE performed by Danay Treadwell DO at [facility] [place] OR     Family History   Problem Relation Age of Onset    High Blood Pressure Mother     High Cholesterol Mother     Diabetes Father     Obesity Father     Thyroid Disease Father     Stroke Father     High Cholesterol Father     High Blood Pressure Father     Heart Disease Father         CHF    Kidney Disease Father     Heart Attack Father     Thyroid Disease Sister     Celiac Disease Sister     Thyroid Disease Brother     Diabetes Brother     High Blood Pressure Brother     Heart Disease Maternal Grandmother     High Blood Pressure Maternal Grandmother     Diabetes Maternal Grandmother     Diabetes Maternal Grandfather     High Blood Pressure Maternal Grandfather     Thyroid Disease Paternal Grandmother     Heart Disease Paternal Grandfather     Diabetes Maternal Aunt     Diabetes Maternal Uncle     Breast Cancer Maternal Aunt 76    Breast Cancer Maternal Cousin 48    Breast Cancer Maternal Cousin 48    Ovarian Cancer Maternal Cousin     Breast Cancer Niece 36     Past Medical History:   Diagnosis Date    Anemia     Anxiety     Celiac disease     Difficult intubation     Intubated successfully 1/2009 with Glidescope #3, 7.0 ETT. Eating disorder 10 years ago    anorexia, Dr. Joseph Chen monitoring. GERD (gastroesophageal reflux disease)     History of blood transfusion 1993    Hypothyroidism     Latex allergy, contact dermatitis     PONV (postoperative nausea and vomiting)     TMJ (dislocation of temporomandibular joint)     Vitamin D deficiency     follows with Dr. Escamilla Cure:    12/02/22 0941   BP: 116/72   Pulse: 73   Temp: 97.5 °F (36.4 °C)   SpO2: 99%   Weight: 171 lb (77.6 kg)   Height: 5' 4\" (1.626 m)     BP Readings from Last 3 Encounters:   12/02/22 116/72   08/19/22 128/70   08/18/22 115/60    110/72    Physical Exam  Vitals and nursing note reviewed. Constitutional:       Appearance: She is well-developed. HENT:      Head: Normocephalic. Right Ear: External ear normal.      Left Ear: External ear normal.      Nose: Nose normal.   Eyes:      Conjunctiva/sclera: Conjunctivae normal.      Pupils: Pupils are equal, round, and reactive to light. Cardiovascular:      Rate and Rhythm: Normal rate. Pulmonary:      Breath sounds: Normal breath sounds. Abdominal:      General: Bowel sounds are normal.      Palpations: Abdomen is soft. Musculoskeletal:         General: Normal range of motion. Cervical back: Normal range of motion and neck supple. Skin:     General: Skin is warm and dry.    Neurological:      Mental Status: She is alert and oriented to person, place, and time. Psychiatric:         Behavior: Behavior normal.      Today's vitals physical exam stable. Medications as prescribed. Follow-up visit with me 3 months sooner if problems. Blood work reviewed and stable and we will repeat again in 6 months. Lab Results   Component Value Date    TSH 1.400 12/01/2022    TSH 0.813 04/01/2022    Y2KBZFA 9.1 12/01/2022    O1CBYRN 8.0 04/01/2022    FT3 2.5 01/16/2013    T4FREE 1.17 09/07/2019    T4FREE 1.34 03/02/2019     Lab Results   Component Value Date    CHOL 221 (H) 12/01/2022    CHOL 190 04/01/2022     Lab Results   Component Value Date    TRIG 54 12/01/2022    TRIG 42 04/01/2022     Lab Results   Component Value Date    HDL 72 12/01/2022    HDL 70 04/01/2022     No results found for: [facility]  Lab Results   Component Value Date    LABVLDL 11 12/01/2022    LABVLDL 8 04/01/2022     No results found for: [facility]  Lab Results   Component Value Date    WBC 3.6 (L) 12/01/2022    HGB 12.4 12/01/2022    HCT 36.8 12/01/2022    MCV 94.6 12/01/2022     12/01/2022    LYMPHOPCT 22.0 12/01/2022    RBC 3.89 12/01/2022    MCH 31.9 12/01/2022    MCHC 33.7 12/01/2022    RDW 12.1 12/01/2022     Lab Results   Component Value Date     12/01/2022    K 4.4 12/01/2022     12/01/2022    CO2 27 12/01/2022    BUN 14 12/01/2022    CREATININE 1.0 12/01/2022    GLUCOSE 100 (H) 12/01/2022    CALCIUM 10.0 12/01/2022    PROT 7.3 12/01/2022    LABALBU 4.4 12/01/2022    BILITOT 0.4 12/01/2022    ALKPHOS 54 12/01/2022    AST 20 12/01/2022    ALT 10 12/01/2022    LABGLOM >60 12/01/2022    GFRAA >60 04/01/2022      No results found for: PSA, PSADIA   Lab Results   Component Value Date    LABA1C 5.0 03/23/2018    LABA1C 5.7 03/17/2014    LABA1C 5.6 01/10/2014     No results found for: EAG      Plan Per Assessment:  Joy Thonymaya was seen today for discuss labs.     Diagnoses and all orders for this visit:    Hypothyroidism, unspecified type    Other depression    Gastroesophageal reflux disease, unspecified whether esophagitis present    Hyperlipidemia, unspecified hyperlipidemia type    Anxiety          Return in about 3 months (around 3/2/2023). Andie Callejas DO    Note was generated with the assistance of voice recognition software. Document was reviewed however may contain grammatical errors.

## 2022-12-13 ENCOUNTER — OFFICE VISIT (OUTPATIENT)
Dept: FAMILY MEDICINE CLINIC | Age: 57
End: 2022-12-13
Payer: COMMERCIAL

## 2022-12-13 VITALS
WEIGHT: 174 LBS | HEIGHT: 64 IN | TEMPERATURE: 97.6 F | RESPIRATION RATE: 20 BRPM | BODY MASS INDEX: 29.71 KG/M2 | SYSTOLIC BLOOD PRESSURE: 112 MMHG | HEART RATE: 67 BPM | OXYGEN SATURATION: 97 % | DIASTOLIC BLOOD PRESSURE: 62 MMHG

## 2022-12-13 DIAGNOSIS — J40 SINOBRONCHITIS: Primary | ICD-10-CM

## 2022-12-13 DIAGNOSIS — J32.9 SINOBRONCHITIS: Primary | ICD-10-CM

## 2022-12-13 PROCEDURE — 99213 OFFICE O/P EST LOW 20 MIN: CPT | Performed by: NURSE PRACTITIONER

## 2022-12-13 RX ORDER — BROMPHENIRAMINE MALEATE, PSEUDOEPHEDRINE HYDROCHLORIDE, AND DEXTROMETHORPHAN HYDROBROMIDE 2; 30; 10 MG/5ML; MG/5ML; MG/5ML
10 SYRUP ORAL EVERY 6 HOURS PRN
Qty: 240 ML | Refills: 0 | Status: SHIPPED | OUTPATIENT
Start: 2022-12-13

## 2022-12-13 RX ORDER — ALBUTEROL SULFATE 90 UG/1
2 AEROSOL, METERED RESPIRATORY (INHALATION) EVERY 4 HOURS PRN
Qty: 1 EACH | Refills: 0 | Status: SHIPPED | OUTPATIENT
Start: 2022-12-13

## 2022-12-13 RX ORDER — DOXYCYCLINE HYCLATE 100 MG/1
100 CAPSULE ORAL 2 TIMES DAILY
Qty: 20 CAPSULE | Refills: 0 | Status: SHIPPED | OUTPATIENT
Start: 2022-12-13 | End: 2022-12-23

## 2022-12-13 NOTE — PROGRESS NOTES
22  William Pearl : 1965 Sex: female  Age 61 y.o. Subjective:  Chief Complaint   Patient presents with    Cough     Started about 1 week ago. Has some clear sinus drainage. Pt took covid test about 1 week ago, was negative    Chest Congestion       HPI:   William Pearl , 61 y.o. female presents to the clinic for evaluation of cough x 7 days. The patient also reports sinus congestion, intermittent wheezing, and mild headache. The patient has taken Mucinex for symptoms. The patient reports worsening symptoms over time. The patient reports possible ill exposure. The patient denies hx of COVID-19. The patient reports negative home COVID-19 test. The patient denies sore throat, rash, and fever. The patient also denies chest pain, abdominal pain, shortness of breath, and nausea / vomiting / diarrhea. ROS:   Unless otherwise stated in this report the patient's positive and negative responses for review of systems for constitutional, eyes, ENT, cardiovascular, respiratory, gastrointestinal, neurological, , musculoskeletal, and integument systems and related systems to the presenting problem are either stated in the history of present illness or were not pertinent or were negative for the symptoms and/or complaints related to the presenting medical problem. Positives and pertinent negatives as per HPI. All others reviewed and are negative. PMH:     Past Medical History:   Diagnosis Date    Anemia     Anxiety     Celiac disease     Difficult intubation     Intubated successfully 2009 with Glidescope #3, 7.0 ETT. Eating disorder 10 years ago    anorexia, Dr. Wellington Jackson monitoring.     GERD (gastroesophageal reflux disease)     History of blood transfusion     Hypothyroidism     Latex allergy, contact dermatitis     PONV (postoperative nausea and vomiting)     TMJ (dislocation of temporomandibular joint)     Vitamin D deficiency     follows with Dr. Wellington Jackson       Past Surgical History:   Procedure Laterality Date    COLONOSCOPY  1/2015    DILATION AND CURETTAGE OF UTERUS  2007, 2006    HYSTERECTOMY (CERVIX STATUS UNKNOWN)  2008    MANDIBLE SURGERY  1997, 1986, 1987, 1990    x 4    OTHER SURGICAL HISTORY  10/8/15    bilateral tmj arthrodesis/steroid injection    UPPER GASTROINTESTINAL ENDOSCOPY  12/17/2014    UPPER GASTROINTESTINAL ENDOSCOPY  04/12/2017    UPPER GASTROINTESTINAL ENDOSCOPY N/A 7/13/2018    EGD BIOPSY performed by Fredi Cabrera MD at [place] [address] N/A 8/18/2022    EGD BIOPSY performed by Laveta Barthel, MD at [address] [facility] Left 1/16/2019    LEFT WRIST OPEN REDUCTION INTERNAL FIXATION performed by Trever Nunn DO at [address] [facility] Left 7/19/2019    LEFT WRIST  REMOVAL OF HARDWARE performed by Trever Nunn DO at [facility] OR       Family History   Problem Relation Age of Onset    High Blood Pressure Mother     High Cholesterol Mother     Diabetes Father     Obesity Father     Thyroid Disease Father     Stroke Father     High Cholesterol Father     High Blood Pressure Father     Heart Disease Father         CHF    Kidney Disease Father     Heart Attack Father     Thyroid Disease Sister     Celiac Disease Sister     Thyroid Disease Brother     Diabetes Brother     High Blood Pressure Brother     Heart Disease Maternal Grandmother     High Blood Pressure Maternal Grandmother     Diabetes Maternal Grandmother     Diabetes Maternal Grandfather     High Blood Pressure Maternal Grandfather     Thyroid Disease Paternal Grandmother     Heart Disease Paternal Grandfather     Diabetes Maternal Aunt     Diabetes Maternal Uncle     Breast Cancer Maternal Aunt 76    Breast Cancer Maternal Cousin 48    Breast Cancer Maternal Cousin 48    Ovarian Cancer Maternal Cousin     Breast Cancer Niece 36       Medications:     Current Outpatient Medications:     doxycycline hyclate (VIBRAMYCIN) 100 MG capsule, Take 1 capsule by mouth 2 times daily for 10 days, Disp: 20 capsule, Rfl: 0    brompheniramine-pseudoephedrine-DM (BROMFED DM) 2-30-10 MG/5ML syrup, Take 10 mLs by mouth every 6 hours as needed for Congestion or Cough, Disp: 240 mL, Rfl: 0    albuterol sulfate HFA (PROVENTIL;VENTOLIN;PROAIR) 108 (90 Base) MCG/ACT inhaler, Inhale 2 puffs into the lungs every 4 hours as needed for Wheezing or Shortness of Breath, Disp: 1 each, Rfl: 0    FLUoxetine (PROZAC) 10 MG capsule, TAKE 1 CAPSULE BY MOUTH 2 TIMES DAILY. , Disp: 180 capsule, Rfl: 1    levothyroxine (SYNTHROID) 75 MCG tablet, Take 1 tablet by mouth in the morning., Disp: 90 tablet, Rfl: 1    esomeprazole (NEXIUM) 40 MG delayed release capsule, Take 1 capsule by mouth in the morning and at bedtime, Disp: 60 capsule, Rfl: 5    Ascorbic Acid (FABI-C PO), Take by mouth daily, Disp: , Rfl:     estradiol (ESTRACE) 0.1 MG/GM vaginal cream, Place vaginally as needed , Disp: , Rfl:     Cholecalciferol (VITAMIN D-3) 5000 UNITS TABS, Take by mouth daily , Disp: , Rfl:     Allergies: Allergies   Allergen Reactions    Latex Hives and Rash    Prednisone        Social History:     Social History     Tobacco Use    Smoking status: Never    Smokeless tobacco: Never   Vaping Use    Vaping Use: Never used   Substance Use Topics    Alcohol use: Yes     Comment: social    Drug use: No       Physical Exam:     Vitals:    12/13/22 1623   BP: 112/62   Site: Right Upper Arm   Position: Sitting   Cuff Size: Medium Adult   Pulse: 67   Resp: 20   Temp: 97.6 °F (36.4 °C)   TempSrc: Temporal   SpO2: 97%   Weight: 174 lb (78.9 kg)   Height: 5' 4\" (1.626 m)       Physical Exam (PE)    Physical Exam  Constitutional:       Appearance: Normal appearance. HENT:      Head: Normocephalic. Right Ear: External ear normal. There is impacted cerumen. Left Ear: Tympanic membrane, ear canal and external ear normal.      Nose: Congestion and rhinorrhea present.       Right Sinus: Maxillary sinus tenderness present. Left Sinus: Maxillary sinus tenderness present. Mouth/Throat:      Mouth: Mucous membranes are moist.      Pharynx: Oropharynx is clear. Eyes:      Pupils: Pupils are equal, round, and reactive to light. Cardiovascular:      Rate and Rhythm: Normal rate and regular rhythm. Pulses: Normal pulses. Heart sounds: Normal heart sounds. Pulmonary:      Effort: Pulmonary effort is normal.      Breath sounds: Normal breath sounds. No wheezing, rhonchi or rales. Abdominal:      General: Bowel sounds are normal.      Palpations: Abdomen is soft. Musculoskeletal:         General: Normal range of motion. Cervical back: Normal range of motion and neck supple. Lymphadenopathy:      Cervical: No cervical adenopathy. Skin:     General: Skin is warm and dry. Capillary Refill: Capillary refill takes less than 2 seconds. Neurological:      General: No focal deficit present. Mental Status: She is alert and oriented to person, place, and time. Psychiatric:         Mood and Affect: Mood normal.         Behavior: Behavior normal.        Testing:   (All laboratory and radiology results have been personally reviewed by myself)  Labs:  No results found for this visit on 12/13/22. Imaging: All Radiology results interpreted by Radiologist unless otherwise noted. No orders to display       Assessment / Plan:   The patient's vitals, allergies, medications, and past medical history have been reviewed. Mauricio Blanchard was seen today for cough and chest congestion. Diagnoses and all orders for this visit:    Sinobronchitis  -     doxycycline hyclate (VIBRAMYCIN) 100 MG capsule; Take 1 capsule by mouth 2 times daily for 10 days  -     brompheniramine-pseudoephedrine-DM (BROMFED DM) 2-30-10 MG/5ML syrup; Take 10 mLs by mouth every 6 hours as needed for Congestion or Cough  -     albuterol sulfate HFA (PROVENTIL;VENTOLIN;PROAIR) 108 (90 Base) MCG/ACT inhaler;  Inhale 2 puffs into the lungs every 4 hours as needed for Wheezing or Shortness of Breath      - Disposition: Home    - Educational material printed for patient's review and were included in patient instructions. After Visit Summary was given to patient at the end of visit. - Encouraged oral fluids and rest. Discussed symptomatic treatments with patient today. The patient is to follow-up with PCP in the next 2-3 days for reevaluation. Red flag symptoms were also discussed with the patient today. If symptoms worsen the patient is to go directly to the emergency department for reevaluation and treatment. Pt verbalizes understanding and is in agreement with plan of care. All questions answered. SIGNATURE: YAKOV Santana    *NOTE: This report was transcribed using voice recognition software. Every effort was made to ensure accuracy; however, inadvertent computerized transcription errors may be present.

## 2022-12-26 DIAGNOSIS — J32.9 SINOBRONCHITIS: ICD-10-CM

## 2022-12-26 DIAGNOSIS — J40 SINOBRONCHITIS: ICD-10-CM

## 2022-12-27 RX ORDER — ALBUTEROL SULFATE 90 UG/1
2 AEROSOL, METERED RESPIRATORY (INHALATION) EVERY 4 HOURS PRN
Qty: 6.7 G | OUTPATIENT
Start: 2022-12-27

## 2023-02-14 RX ORDER — LEVOTHYROXINE SODIUM 0.07 MG/1
TABLET ORAL
Qty: 90 TABLET | Refills: 1 | Status: SHIPPED | OUTPATIENT
Start: 2023-02-14

## 2023-03-03 ENCOUNTER — OFFICE VISIT (OUTPATIENT)
Dept: PRIMARY CARE CLINIC | Age: 58
End: 2023-03-03
Payer: COMMERCIAL

## 2023-03-03 VITALS
OXYGEN SATURATION: 99 % | HEIGHT: 64 IN | TEMPERATURE: 98.1 F | DIASTOLIC BLOOD PRESSURE: 82 MMHG | HEART RATE: 73 BPM | BODY MASS INDEX: 29.19 KG/M2 | SYSTOLIC BLOOD PRESSURE: 114 MMHG | WEIGHT: 171 LBS

## 2023-03-03 DIAGNOSIS — R73.01 IMPAIRED FASTING GLUCOSE: ICD-10-CM

## 2023-03-03 DIAGNOSIS — E03.9 HYPOTHYROIDISM, UNSPECIFIED TYPE: Primary | ICD-10-CM

## 2023-03-03 DIAGNOSIS — M26.609 TMJ (TEMPOROMANDIBULAR JOINT SYNDROME): ICD-10-CM

## 2023-03-03 DIAGNOSIS — F32.89 OTHER DEPRESSION: ICD-10-CM

## 2023-03-03 DIAGNOSIS — K21.9 GASTROESOPHAGEAL REFLUX DISEASE, UNSPECIFIED WHETHER ESOPHAGITIS PRESENT: ICD-10-CM

## 2023-03-03 DIAGNOSIS — L74.52 FOCAL HYPERHIDROSIS DUE TO FREY SYNDROME: ICD-10-CM

## 2023-03-03 DIAGNOSIS — E78.5 HYPERLIPIDEMIA, UNSPECIFIED HYPERLIPIDEMIA TYPE: ICD-10-CM

## 2023-03-03 PROCEDURE — 99214 OFFICE O/P EST MOD 30 MIN: CPT | Performed by: FAMILY MEDICINE

## 2023-03-03 SDOH — ECONOMIC STABILITY: FOOD INSECURITY: WITHIN THE PAST 12 MONTHS, YOU WORRIED THAT YOUR FOOD WOULD RUN OUT BEFORE YOU GOT MONEY TO BUY MORE.: PATIENT DECLINED

## 2023-03-03 SDOH — ECONOMIC STABILITY: HOUSING INSECURITY
IN THE LAST 12 MONTHS, WAS THERE A TIME WHEN YOU DID NOT HAVE A STEADY PLACE TO SLEEP OR SLEPT IN A SHELTER (INCLUDING NOW)?: PATIENT REFUSED

## 2023-03-03 SDOH — ECONOMIC STABILITY: INCOME INSECURITY: HOW HARD IS IT FOR YOU TO PAY FOR THE VERY BASICS LIKE FOOD, HOUSING, MEDICAL CARE, AND HEATING?: PATIENT DECLINED

## 2023-03-03 ASSESSMENT — ENCOUNTER SYMPTOMS
RESPIRATORY NEGATIVE: 1
GASTROINTESTINAL NEGATIVE: 1
EYES NEGATIVE: 1
ALLERGIC/IMMUNOLOGIC NEGATIVE: 1

## 2023-03-03 ASSESSMENT — PATIENT HEALTH QUESTIONNAIRE - PHQ9
2. FEELING DOWN, DEPRESSED OR HOPELESS: 0
9. THOUGHTS THAT YOU WOULD BE BETTER OFF DEAD, OR OF HURTING YOURSELF: 0
1. LITTLE INTEREST OR PLEASURE IN DOING THINGS: 0
8. MOVING OR SPEAKING SO SLOWLY THAT OTHER PEOPLE COULD HAVE NOTICED. OR THE OPPOSITE, BEING SO FIGETY OR RESTLESS THAT YOU HAVE BEEN MOVING AROUND A LOT MORE THAN USUAL: 0
6. FEELING BAD ABOUT YOURSELF - OR THAT YOU ARE A FAILURE OR HAVE LET YOURSELF OR YOUR FAMILY DOWN: 0
SUM OF ALL RESPONSES TO PHQ QUESTIONS 1-9: 0
10. IF YOU CHECKED OFF ANY PROBLEMS, HOW DIFFICULT HAVE THESE PROBLEMS MADE IT FOR YOU TO DO YOUR WORK, TAKE CARE OF THINGS AT HOME, OR GET ALONG WITH OTHER PEOPLE: 0
SUM OF ALL RESPONSES TO PHQ QUESTIONS 1-9: 0
4. FEELING TIRED OR HAVING LITTLE ENERGY: 0
3. TROUBLE FALLING OR STAYING ASLEEP: 0
5. POOR APPETITE OR OVEREATING: 0
7. TROUBLE CONCENTRATING ON THINGS, SUCH AS READING THE NEWSPAPER OR WATCHING TELEVISION: 0
SUM OF ALL RESPONSES TO PHQ QUESTIONS 1-9: 0
SUM OF ALL RESPONSES TO PHQ QUESTIONS 1-9: 0
SUM OF ALL RESPONSES TO PHQ9 QUESTIONS 1 & 2: 0

## 2023-03-03 NOTE — PROGRESS NOTES
3/3/23     Ingrid Magana    : 1965 Sex: female   Age: 61 y.o. Chief Complaint   Patient presents with    Hypothyroidism    Medication Check       Prior to Admission medications    Medication Sig Start Date End Date Taking? Authorizing Provider   levothyroxine (SYNTHROID) 75 MCG tablet TAKE ONE TABLET BY MOUTH IN THE MORNING 23  Yes Maria Elena Kern DO   albuterol sulfate HFA (PROVENTIL;VENTOLIN;PROAIR) 108 (90 Base) MCG/ACT inhaler Inhale 2 puffs into the lungs every 4 hours as needed for Wheezing or Shortness of Breath 22  Yes Arelis Juarez., APRN - CNP   FLUoxetine (PROZAC) 10 MG capsule TAKE 1 CAPSULE BY MOUTH 2 TIMES DAILY. 10/13/22  Yes Maria Elena Kern DO   esomeprazole (NEXIUM) 40 MG delayed release capsule Take 1 capsule by mouth in the morning and at bedtime 22  Yes Maria Elena Kern DO   Ascorbic Acid (FABI-C PO) Take by mouth daily   Yes Historical Provider, MD   estradiol (ESTRACE) 0.1 MG/GM vaginal cream Place vaginally as needed  12/15/20  Yes Historical Provider, MD   Cholecalciferol (VITAMIN D-3) 5000 UNITS TABS Take by mouth daily    Yes Historical Provider, MD          HPI: Patient evaluated today low thyroid reflux disease hyperlipidemia impaired fasting glucose anxiety depression TMJ syndrome where she underwent rather extensive surgery and 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 Just involves the face. She is going to see ENT for an evaluation recommendations may be a candidate for Drysol and also may be a candidate for Botox injections. Review of Systems   Constitutional: Negative. HENT: Negative. Eyes: Negative. Respiratory: Negative. Gastrointestinal: Negative. Endocrine: Negative. Genitourinary: Negative. Musculoskeletal: Negative. Skin: Negative. Allergic/Immunologic: Negative. Neurological: Negative. Hematological: Negative. Psychiatric/Behavioral: Negative. Today systems review overall stable aside from HPI complaints. Current Outpatient Medications:     levothyroxine (SYNTHROID) 75 MCG tablet, TAKE ONE TABLET BY MOUTH IN THE MORNING, Disp: 90 tablet, Rfl: 1    albuterol sulfate HFA (PROVENTIL;VENTOLIN;PROAIR) 108 (90 Base) MCG/ACT inhaler, Inhale 2 puffs into the lungs every 4 hours as needed for Wheezing or Shortness of Breath, Disp: 1 each, Rfl: 0    FLUoxetine (PROZAC) 10 MG capsule, TAKE 1 CAPSULE BY MOUTH 2 TIMES DAILY. , Disp: 180 capsule, Rfl: 1    esomeprazole (NEXIUM) 40 MG delayed release capsule, Take 1 capsule by mouth in the morning and at bedtime, Disp: 60 capsule, Rfl: 5    Ascorbic Acid (FABI-C PO), Take by mouth daily, Disp: , Rfl:     estradiol (ESTRACE) 0.1 MG/GM vaginal cream, Place vaginally as needed , Disp: , Rfl:     Cholecalciferol (VITAMIN D-3) 5000 UNITS TABS, Take by mouth daily , Disp: , Rfl:     Allergies   Allergen Reactions    Latex Hives and Rash    Prednisone        Social History     Tobacco Use    Smoking status: Never    Smokeless tobacco: Never   Vaping Use    Vaping Use: Never used   Substance Use Topics    Alcohol use: Yes     Comment: social    Drug use: No      Past Surgical History:   Procedure Laterality Date    COLONOSCOPY  1/2015    DILATION AND CURETTAGE OF UTERUS  2007, 2006    HYSTERECTOMY (CERVIX STATUS UNKNOWN)  2008    [address] [facility], 12, [facility], 1990    x 4    OTHER SURGICAL HISTORY  10/8/15    bilateral tmj arthrodesis/steroid injection    UPPER GASTROINTESTINAL ENDOSCOPY  12/17/2014    UPPER GASTROINTESTINAL ENDOSCOPY  04/12/2017    UPPER GASTROINTESTINAL ENDOSCOPY N/A 7/13/2018    EGD BIOPSY performed by Mike Chaudhari MD at [address] N/A 8/18/2022    EGD BIOPSY performed by Nadir Naqvi MD at [address] Left 1/16/2019    LEFT WRIST OPEN REDUCTION INTERNAL FIXATION performed by Shirley Viera DO at [address] [facility] FRACTURE SURGERY Left 7/19/2019    LEFT WRIST  REMOVAL OF HARDWARE performed by Michael Johnson DO at [address] [facility] History   Problem Relation Age of Onset    High Blood Pressure Mother     High Cholesterol Mother     Diabetes Father     Obesity Father     Thyroid Disease Father     Stroke Father     High Cholesterol Father     High Blood Pressure Father     Heart Disease Father         CHF    Kidney Disease Father     Heart Attack Father     Thyroid Disease Sister     Celiac Disease Sister     Thyroid Disease Brother     Diabetes Brother     High Blood Pressure Brother     Heart Disease Maternal Grandmother     High Blood Pressure Maternal Grandmother     Diabetes Maternal Grandmother     Diabetes Maternal Grandfather     High Blood Pressure Maternal Grandfather     Thyroid Disease Paternal Grandmother     Heart Disease Paternal Grandfather     Diabetes Maternal Aunt     Diabetes Maternal Uncle     Breast Cancer Maternal Aunt 76    Breast Cancer Maternal Cousin 48    Breast Cancer Maternal Cousin 48    Ovarian Cancer Maternal Cousin     Breast Cancer Niece 36     Past Medical History:   Diagnosis Date    Anemia     Anxiety     Celiac disease     Difficult intubation     Intubated successfully 1/2009 with Glidescope #3, 7.0 ETT. Eating disorder 10 years ago    anorexia, Dr. Naomi Penaloza monitoring. GERD (gastroesophageal reflux disease)     History of blood transfusion 1993    Hypothyroidism     Latex allergy, contact dermatitis     PONV (postoperative nausea and vomiting)     TMJ (dislocation of temporomandibular joint)     Vitamin D deficiency     follows with Dr. Waleska Johnson:    03/03/23 1011   BP: 114/82   Pulse: 73   Temp: 98.1 °F (36.7 °C)   SpO2: 99%   Weight: 171 lb (77.6 kg)   Height: 5' 4\" (1.626 m)     BP Readings from Last 3 Encounters:   03/03/23 114/82   12/13/22 112/62   12/02/22 116/72    110/80    Physical Exam  Vitals and nursing note reviewed.    Constitutional: Appearance: She is well-developed. HENT:      Head: Normocephalic. Right Ear: External ear normal.      Left Ear: External ear normal.      Nose: Nose normal.   Eyes:      Conjunctiva/sclera: Conjunctivae normal.      Pupils: Pupils are equal, round, and reactive to light. Cardiovascular:      Rate and Rhythm: Normal rate. Pulmonary:      Breath sounds: Normal breath sounds. Abdominal:      General: Bowel sounds are normal.      Palpations: Abdomen is soft. Musculoskeletal:         General: Normal range of motion. Cervical back: Normal range of motion and neck supple. Skin:     General: Skin is warm and dry. Neurological:      Mental Status: She is alert and oriented to person, place, and time. Psychiatric:         Behavior: Behavior normal.      In today's vitals physical examination stable. Medications as prescribed. Follow-up visit with me next 4 weeks and sooner if problems.       Lab Results   Component Value Date    TSH 1.400 12/01/2022    TSH 0.813 04/01/2022    D7ZYGKO 9.1 12/01/2022    D2DUNLX 8.0 04/01/2022    FT3 2.5 01/16/2013    T4FREE 1.17 09/07/2019    T4FREE 1.34 03/02/2019     Lab Results   Component Value Date    CHOL 221 (H) 12/01/2022    CHOL 190 04/01/2022     Lab Results   Component Value Date    TRIG 54 12/01/2022    TRIG 42 04/01/2022     Lab Results   Component Value Date    HDL 72 12/01/2022    HDL 70 04/01/2022     No results found for: [facility]  Lab Results   Component Value Date    LABVLDL 11 12/01/2022    LABVLDL 8 04/01/2022     No results found for: [facility]  Lab Results   Component Value Date    WBC 3.6 (L) 12/01/2022    HGB 12.4 12/01/2022    HCT 36.8 12/01/2022    MCV 94.6 12/01/2022     12/01/2022    LYMPHOPCT 22.0 12/01/2022    RBC 3.89 12/01/2022    MCH 31.9 12/01/2022    MCHC 33.7 12/01/2022    RDW 12.1 12/01/2022     Lab Results   Component Value Date     12/01/2022    K 4.4 12/01/2022     12/01/2022    CO2 27 12/01/2022 BUN 14 12/01/2022    CREATININE 1.0 12/01/2022    GLUCOSE 100 (H) 12/01/2022    CALCIUM 10.0 12/01/2022    PROT 7.3 12/01/2022    LABALBU 4.4 12/01/2022    BILITOT 0.4 12/01/2022    ALKPHOS 54 12/01/2022    AST 20 12/01/2022    ALT 10 12/01/2022    LABGLOM >60 12/01/2022    GFRAA >60 04/01/2022      No results found for: PSA, PSADIA   Lab Results   Component Value Date    LABA1C 5.0 03/23/2018    LABA1C 5.7 03/17/2014    LABA1C 5.6 01/10/2014     No results found for: EAG      Plan Per Assessment:  Tonya Schultz was seen today for hypothyroidism and medication check. Diagnoses and all orders for this visit:    Hypothyroidism, unspecified type  -     CBC with Auto Differential; Future  -     Comprehensive Metabolic Panel; Future  -     Lipid Panel; Future  -     T4; Future  -     TSH; Future  -     C-Reactive Protein; Future    Gastroesophageal reflux disease, unspecified whether esophagitis present  -     CBC with Auto Differential; Future  -     Comprehensive Metabolic Panel; Future  -     Lipid Panel; Future  -     T4; Future  -     TSH; Future  -     C-Reactive Protein; Future    Hyperlipidemia, unspecified hyperlipidemia type  -     CBC with Auto Differential; Future  -     Comprehensive Metabolic Panel; Future  -     Lipid Panel; Future  -     T4; Future  -     TSH; Future  -     C-Reactive Protein; Future    Impaired fasting glucose  -     CBC with Auto Differential; Future  -     Comprehensive Metabolic Panel; Future  -     Lipid Panel; Future  -     T4; Future  -     TSH; Future  -     C-Reactive Protein; Future    Other depression    TMJ (temporomandibular joint syndrome)  -     Kulwant Stuart DO, Otolaryngology, [place]    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 Jessica Osuna DO    Note was generated with the assistance of voice recognition software.   Document was reviewed however may contain grammatical errors.

## 2023-03-20 ENCOUNTER — TELEPHONE (OUTPATIENT)
Dept: ENT CLINIC | Age: 58
End: 2023-03-20

## 2023-03-20 NOTE — TELEPHONE ENCOUNTER
Pt called to cancel her appt today 03-20-23 d/t Covid. I have her r/s to 05-01-23. Pt would like to know if she could be seen the week of April 10 as she is off that week.   She was scheduled in [place], willing to go to either office, [phone]

## 2023-04-10 ENCOUNTER — OFFICE VISIT (OUTPATIENT)
Dept: ENT CLINIC | Age: 58
End: 2023-04-10
Payer: COMMERCIAL

## 2023-04-10 VITALS
BODY MASS INDEX: 29.19 KG/M2 | SYSTOLIC BLOOD PRESSURE: 122 MMHG | HEIGHT: 64 IN | DIASTOLIC BLOOD PRESSURE: 77 MMHG | HEART RATE: 85 BPM | WEIGHT: 171 LBS

## 2023-04-10 DIAGNOSIS — L74.52 FOCAL HYPERHIDROSIS DUE TO FREY SYNDROME: Primary | ICD-10-CM

## 2023-04-10 PROCEDURE — 99203 OFFICE O/P NEW LOW 30 MIN: CPT | Performed by: OTOLARYNGOLOGY

## 2023-04-10 ASSESSMENT — ENCOUNTER SYMPTOMS
COUGH: 0
SINUS PAIN: 0
VOICE CHANGE: 0
STRIDOR: 0
SORE THROAT: 0
RHINORRHEA: 0
CHOKING: 0
COLOR CHANGE: 0
SINUS PRESSURE: 0
TROUBLE SWALLOWING: 0
WHEEZING: 0
GASTROINTESTINAL NEGATIVE: 1

## 2023-04-10 ASSESSMENT — VISUAL ACUITY: OU: 1

## 2023-04-26 LAB
CHOLESTEROL, TOTAL: 198 MG/DL (ref 0–199)
GLUCOSE SERPL-MCNC: 95 MG/DL (ref 74–107)
HDLC SERPL-MCNC: 74 MG/DL
LDLC SERPL CALC-MCNC: 115 MG/DL (ref 0–99)
TRIGL SERPL-MCNC: 46 MG/DL (ref 0–149)

## 2023-04-27 ENCOUNTER — TELEPHONE (OUTPATIENT)
Dept: ENT CLINIC | Age: 58
End: 2023-04-27

## 2023-05-04 NOTE — TELEPHONE ENCOUNTER
Humberto gil called and left voicemail requesting clarification on directions. Requesting return call at [phone].

## 2023-05-18 RX ORDER — ESOMEPRAZOLE MAGNESIUM 40 MG/1
CAPSULE, DELAYED RELEASE ORAL
Qty: 60 CAPSULE | Refills: 5 | Status: SHIPPED | OUTPATIENT
Start: 2023-05-18

## 2023-05-22 ENCOUNTER — OFFICE VISIT (OUTPATIENT)
Dept: ENT CLINIC | Age: 58
End: 2023-05-22

## 2023-05-22 VITALS
DIASTOLIC BLOOD PRESSURE: 72 MMHG | HEART RATE: 71 BPM | BODY MASS INDEX: 29.19 KG/M2 | WEIGHT: 171 LBS | HEIGHT: 64 IN | SYSTOLIC BLOOD PRESSURE: 109 MMHG

## 2023-05-22 DIAGNOSIS — L74.52 FOCAL HYPERHIDROSIS DUE TO FREY SYNDROME: Primary | ICD-10-CM

## 2023-05-30 ENCOUNTER — OFFICE VISIT (OUTPATIENT)
Dept: ENT CLINIC | Age: 58
End: 2023-05-30
Payer: COMMERCIAL

## 2023-05-30 VITALS
HEART RATE: 80 BPM | WEIGHT: 175 LBS | HEIGHT: 64 IN | DIASTOLIC BLOOD PRESSURE: 73 MMHG | BODY MASS INDEX: 29.88 KG/M2 | SYSTOLIC BLOOD PRESSURE: 120 MMHG

## 2023-05-30 DIAGNOSIS — L74.52 FOCAL HYPERHIDROSIS DUE TO FREY SYNDROME: Primary | ICD-10-CM

## 2023-05-30 PROCEDURE — 99213 OFFICE O/P EST LOW 20 MIN: CPT | Performed by: OTOLARYNGOLOGY

## 2023-05-30 ASSESSMENT — VISUAL ACUITY: OU: 1

## 2023-05-30 ASSESSMENT — ENCOUNTER SYMPTOMS
CHOKING: 0
TROUBLE SWALLOWING: 0
SORE THROAT: 0
SINUS PRESSURE: 0
RHINORRHEA: 0
COLOR CHANGE: 0
WHEEZING: 0
SINUS PAIN: 0
COUGH: 0
GASTROINTESTINAL NEGATIVE: 1
VOICE CHANGE: 0
STRIDOR: 0

## 2023-05-30 NOTE — PROGRESS NOTES
[address] [facility] Otolaryngology  Dr. Bruce Bennett. TENZIN Pérez Ms.Ed. Patient Name:  Katlyn Ivy  :  1965     CHIEF C/O:    Chief Complaint   Patient presents with    Follow-up     Botox injection. HISTORY OBTAINED FROM:  patient    HISTORY OF PRESENT ILLNESS:       Leola Giles is a 61y.o. 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 She was referred by Dr. Bryce Mehta ([facility]). History of 6 TMJ surgeries in her lifetime. Had bilateral botox injections at the last visit over bilateral parotid and she has noticed some improvement but still with sweating in the area. Denies any facial swelling denies any facial nerve weakness denies any changes in vision, or associated dry eye type symptoms of either location. Past Medical History:   Diagnosis Date    Anemia     Anxiety     Celiac disease     Difficult intubation     Intubated successfully 2009 with Glidescope #3, 7.0 ETT. Eating disorder 10 years ago    anorexia, Dr. Zia Romero monitoring.     GERD (gastroesophageal reflux disease)     History of blood transfusion     Hypothyroidism     Latex allergy, contact dermatitis     PONV (postoperative nausea and vomiting)     TMJ (dislocation of temporomandibular joint)     Vitamin D deficiency     follows with Dr. Zia Romero     Past Surgical History:   Procedure Laterality Date    COLONOSCOPY  2015    DILATION AND CURETTAGE OF UTERUS  ,     HYSTERECTOMY (CERVIX STATUS UNKNOWN)      [address]    x 4    OTHER SURGICAL HISTORY  10/8/15    bilateral tmj arthrodesis/steroid injection    UPPER GASTROINTESTINAL ENDOSCOPY  2014    UPPER GASTROINTESTINAL ENDOSCOPY  2017    UPPER GASTROINTESTINAL ENDOSCOPY N/A 2018    EGD BIOPSY performed by Roshni Kenyon MD at [address] [place] [address] Dr Ibarra 2022    EGD BIOPSY performed by Luzmaria Maher MD at [address] [place] [address] Dr Left 2019
99.6

## 2023-06-19 NOTE — PROGRESS NOTES
Op Note    Pre op diagnosis: Bilateral Kevin's syndrome    Post Op: Same    Procedure: Bilateral Botox injections for Kevin's syndrome    Anesthesia: None    Surgeon: Nahum John    Procedure Note: Patient was appropriate consented, positioned and the areas of hyperhidrosis were noted by the patient herself, and then using a mixture of 2.5 units per 0.1 cc approximately 27 units total were injected bilaterally for hyperhidrosis associated with syndrome status post multiple TMJ surgeries. Patient tolerated procedure well, minimal bleeding was noted, will continue to observe and follow-up in 1 week. Complications: none    Blood Loss: Min.     Disposition: home

## 2023-08-02 ENCOUNTER — OFFICE VISIT (OUTPATIENT)
Dept: PRIMARY CARE CLINIC | Age: 58
End: 2023-08-02
Payer: COMMERCIAL

## 2023-08-02 VITALS
BODY MASS INDEX: 29.71 KG/M2 | SYSTOLIC BLOOD PRESSURE: 104 MMHG | DIASTOLIC BLOOD PRESSURE: 76 MMHG | HEIGHT: 64 IN | OXYGEN SATURATION: 98 % | WEIGHT: 174 LBS | HEART RATE: 60 BPM | TEMPERATURE: 98 F

## 2023-08-02 DIAGNOSIS — E78.5 HYPERLIPIDEMIA, UNSPECIFIED HYPERLIPIDEMIA TYPE: ICD-10-CM

## 2023-08-02 DIAGNOSIS — E66.9 OBESITY (BMI 30-39.9): ICD-10-CM

## 2023-08-02 DIAGNOSIS — L74.52 FOCAL HYPERHIDROSIS DUE TO FREY SYNDROME: ICD-10-CM

## 2023-08-02 DIAGNOSIS — F32.89 OTHER DEPRESSION: ICD-10-CM

## 2023-08-02 DIAGNOSIS — K21.9 LARYNGOPHARYNGEAL REFLUX (LPR): ICD-10-CM

## 2023-08-02 DIAGNOSIS — M26.609 TMJ (TEMPOROMANDIBULAR JOINT SYNDROME): ICD-10-CM

## 2023-08-02 DIAGNOSIS — E03.9 HYPOTHYROIDISM, UNSPECIFIED TYPE: Primary | ICD-10-CM

## 2023-08-02 PROCEDURE — 99214 OFFICE O/P EST MOD 30 MIN: CPT | Performed by: FAMILY MEDICINE

## 2023-08-02 RX ORDER — PHENTERMINE AND TOPIRAMATE 3.75; 23 MG/1; MG/1
CAPSULE, EXTENDED RELEASE ORAL
Qty: 14 CAPSULE | Refills: 0 | Status: SHIPPED | OUTPATIENT
Start: 2023-08-02 | End: 2023-08-16

## 2023-08-02 RX ORDER — LEVOTHYROXINE SODIUM 0.07 MG/1
TABLET ORAL
Qty: 90 TABLET | Refills: 3 | Status: SHIPPED | OUTPATIENT
Start: 2023-08-02

## 2023-08-02 RX ORDER — FLUOXETINE 10 MG/1
CAPSULE ORAL
Qty: 180 CAPSULE | Refills: 3 | Status: SHIPPED | OUTPATIENT
Start: 2023-08-02

## 2023-08-02 NOTE — PROGRESS NOTES
23     Ju Nath    : 1965 Sex: female   Age: 60 y.o. Chief Complaint   Patient presents with    Temporomandibular Joint Pain     Follow up after botox --     Hypothyroidism       Prior to Admission medications    Medication Sig Start Date End Date Taking? Authorizing Provider   levothyroxine (SYNTHROID) 75 MCG tablet TAKE ONE TABLET BY MOUTH IN THE MORNING 23  Yes Kishor Kern, DO   FLUoxetine (PROZAC) 10 MG capsule TAKE 1 CAPSULE BY MOUTH 2 TIMES DAILY. 23  Yes Mamie Fothergill Traikoff, DO   Phentermine-Topiramate (QSYMIA) 3.75-23 MG CP24 1 qd x14 days 23 Yes Mamie Fothergill Traikoff, DO   Phentermine-Topiramate 7.5-46 MG CP24 1 qd 23 Yes Kishor Kern, DO   esomeprazole (NEXIUM) 40 MG delayed release capsule TAKE ONE CAPSULE BY MOUTH EVERY MORNING AND TAKE 1 CAPSULE BY MOUTH EVERY NIGHT AT BEDTIME 23  Yes Thais Wisdom, DO   Multiple Vitamin (MULTIVITAMIN ADULT PO) Take by mouth   Yes Historical Provider, MD   Ascorbic Acid (FABI-C PO) Take by mouth daily   Yes Historical Provider, MD   estradiol (ESTRACE) 0.1 MG/GM vaginal cream Place vaginally as needed  12/15/20  Yes Historical Provider, MD   Cholecalciferol (VITAMIN D-3) 5000 UNITS TABS Take by mouth daily   Yes Historical Provider, MD          HPI: Seen today issues hypothyroid hyperlipidemia mild depression anxiety TMJ syndrome focal hyperhidrosis involving the eye and currently receiving Botox injections. Laryngeal pharyngeal reflux remains on Nexium 40 mg twice a day and controlled. Problems with weight gain and borderline obesity with BMI just at 30. Recommending some Qsymia and instructions use side effects and then follow-up in the next month. Additional problems hyperlipidemia and thyroid and she is a candidate for the medication. Review of Systems   Constitutional: Negative. HENT: Negative. Eyes: Negative. Respiratory: Negative. Gastrointestinal: Negative.

## 2023-08-25 RX ORDER — ESOMEPRAZOLE MAGNESIUM 40 MG/1
CAPSULE, DELAYED RELEASE ORAL
Qty: 180 CAPSULE | Refills: 0 | Status: SHIPPED | OUTPATIENT
Start: 2023-08-25

## 2023-08-30 ENCOUNTER — TELEPHONE (OUTPATIENT)
Dept: ENT CLINIC | Age: 58
End: 2023-08-30

## 2023-09-11 ENCOUNTER — OFFICE VISIT (OUTPATIENT)
Dept: ENT CLINIC | Age: 58
End: 2023-09-11

## 2023-09-11 VITALS — BODY MASS INDEX: 28.68 KG/M2 | WEIGHT: 168 LBS | HEIGHT: 64 IN

## 2023-09-11 DIAGNOSIS — L74.52 FOCAL HYPERHIDROSIS DUE TO FREY SYNDROME: Primary | ICD-10-CM

## 2023-09-22 ENCOUNTER — TELEPHONE (OUTPATIENT)
Dept: ENT CLINIC | Age: 58
End: 2023-09-22

## 2023-09-22 NOTE — TELEPHONE ENCOUNTER
Pt called in to r/s her appt on 9/25/23 for botox injections,she would like to r/s the injection for 3 months out.  Graciela Urena can be reached at [phone]

## 2023-09-22 NOTE — TELEPHONE ENCOUNTER
Called pt who stated she is doing good after last weeks botox so she just wants to push it out to the 3 months.  Apt r/s'd for 12/11/23 @ Buzz

## 2023-09-25 ENCOUNTER — OFFICE VISIT (OUTPATIENT)
Dept: PRIMARY CARE CLINIC | Age: 58
End: 2023-09-25
Payer: COMMERCIAL

## 2023-09-25 VITALS
SYSTOLIC BLOOD PRESSURE: 110 MMHG | HEIGHT: 64 IN | BODY MASS INDEX: 29.19 KG/M2 | WEIGHT: 171 LBS | DIASTOLIC BLOOD PRESSURE: 64 MMHG | OXYGEN SATURATION: 97 % | HEART RATE: 70 BPM | TEMPERATURE: 98.6 F

## 2023-09-25 DIAGNOSIS — E78.2 MIXED HYPERLIPIDEMIA: ICD-10-CM

## 2023-09-25 DIAGNOSIS — R63.5 WEIGHT GAIN: ICD-10-CM

## 2023-09-25 DIAGNOSIS — E66.9 OBESITY (BMI 30-39.9): ICD-10-CM

## 2023-09-25 DIAGNOSIS — E03.8 OTHER SPECIFIED HYPOTHYROIDISM: Primary | ICD-10-CM

## 2023-09-25 PROCEDURE — 99214 OFFICE O/P EST MOD 30 MIN: CPT | Performed by: FAMILY MEDICINE

## 2023-09-25 SDOH — ECONOMIC STABILITY: INCOME INSECURITY: HOW HARD IS IT FOR YOU TO PAY FOR THE VERY BASICS LIKE FOOD, HOUSING, MEDICAL CARE, AND HEATING?: NOT HARD AT ALL

## 2023-09-25 SDOH — ECONOMIC STABILITY: FOOD INSECURITY: WITHIN THE PAST 12 MONTHS, YOU WORRIED THAT YOUR FOOD WOULD RUN OUT BEFORE YOU GOT MONEY TO BUY MORE.: NEVER TRUE

## 2023-09-25 SDOH — ECONOMIC STABILITY: FOOD INSECURITY: WITHIN THE PAST 12 MONTHS, THE FOOD YOU BOUGHT JUST DIDN'T LAST AND YOU DIDN'T HAVE MONEY TO GET MORE.: NEVER TRUE

## 2023-09-25 SDOH — ECONOMIC STABILITY: HOUSING INSECURITY
IN THE LAST 12 MONTHS, WAS THERE A TIME WHEN YOU DID NOT HAVE A STEADY PLACE TO SLEEP OR SLEPT IN A SHELTER (INCLUDING NOW)?: NO

## 2023-09-25 NOTE — PROGRESS NOTES
23     Ju Nath    : 1965 Sex: female   Age: 60 y.o. Chief Complaint   Patient presents with    Discuss Medications    Hypothyroidism       Prior to Admission medications    Medication Sig Start Date End Date Taking? Authorizing Provider   esomeprazole (NEXIUM) 40 MG delayed release capsule TAKE ONE CAPSULE BY MOUTH EVERY MORNING AND TAKE 1 CAPSULE BY MOUTH EVERY NIGHT AT BEDTIME 23  Yes Sarah Hanley, DO   levothyroxine (SYNTHROID) 75 MCG tablet TAKE ONE TABLET BY MOUTH IN THE MORNING 23  Yes Kishor Kern, DO   FLUoxetine (PROZAC) 10 MG capsule TAKE 1 CAPSULE BY MOUTH 2 TIMES DAILY. 23  Yes Thais Wisdom, DO   Multiple Vitamin (MULTIVITAMIN ADULT PO) Take by mouth   Yes Historical Provider, MD   Ascorbic Acid (FABI-C PO) Take by mouth daily   Yes Historical Provider, MD   estradiol (ESTRACE) 0.1 MG/GM vaginal cream Place vaginally as needed  12/15/20  Yes Historical Provider, MD   Cholecalciferol (VITAMIN D-3) 5000 UNITS TABS Take by mouth daily   Yes Historical Provider, MD          HPI: Patient evaluated today low thyroid weight gain obesity hyperlipidemia. Medically overall doing well but Qsymia was too expensive and she prefers to stay off medication. Agreeable to the diet and exercise program and encouraged Mediterranean diet as well as 5 days a week 45 minutes a day of regular exercise. Goal weight loss 15 pounds by next visit. All medications reviewed. maintain as prescribed and we will plan some labs on follow-up. Review of Systems   Constitutional: Negative. HENT: Negative. Eyes: Negative. Respiratory: Negative. Gastrointestinal: Negative. Endocrine: Negative. Genitourinary: Negative. Musculoskeletal: Negative. Skin: Negative. Allergic/Immunologic: Negative. Neurological: Negative. Hematological: Negative. Psychiatric/Behavioral: Negative.                 Current Outpatient Medications:     esomeprazole

## 2023-10-30 RX ORDER — ESOMEPRAZOLE MAGNESIUM 40 MG/1
CAPSULE, DELAYED RELEASE ORAL
Qty: 180 CAPSULE | Refills: 0 | Status: SHIPPED | OUTPATIENT
Start: 2023-10-30

## 2023-11-16 ENCOUNTER — TELEPHONE (OUTPATIENT)
Dept: ENT CLINIC | Age: 58
End: 2023-11-16

## 2023-11-16 NOTE — TELEPHONE ENCOUNTER
----- Message from Vic Hines MA sent at 9/22/2023  1:19 PM EDT -----  Reorder botox for apt 12/11/23 @ shalini

## 2023-11-27 NOTE — TELEPHONE ENCOUNTER
Rx sent in 11/16/23, has not been received yet. Called into [facility] pharmacy spoke with Sean Avila, refill hasnt been sent as it was too soon. She will process it now. It will ship directly to our office and is expected by 12/6.

## 2023-12-11 ENCOUNTER — OFFICE VISIT (OUTPATIENT)
Dept: ENT CLINIC | Age: 58
End: 2023-12-11
Payer: COMMERCIAL

## 2023-12-11 VITALS
WEIGHT: 171 LBS | BODY MASS INDEX: 33.57 KG/M2 | HEIGHT: 60 IN | HEART RATE: 63 BPM | SYSTOLIC BLOOD PRESSURE: 111 MMHG | DIASTOLIC BLOOD PRESSURE: 70 MMHG

## 2023-12-11 DIAGNOSIS — L74.52 FOCAL HYPERHIDROSIS DUE TO FREY SYNDROME: Primary | ICD-10-CM

## 2023-12-11 PROCEDURE — 64612 DESTROY NERVE FACE MUSCLE: CPT | Performed by: OTOLARYNGOLOGY

## 2023-12-14 NOTE — PROGRESS NOTES
Op Note     Pre op diagnosis: Bilateral Kevin's syndrome     Post Op: Same     Procedure: Bilateral Botox injections for Kevin's syndrome     Anesthesia: None     Surgeon: Юлия Mir     Procedure Note: Patient was appropriate consented, positioned and the areas of hyperhidrosis were noted by the patient herself, and then using a mixture of 2.5 units per 0.1 cc approximately 27 units total were injected bilaterally for hyperhidrosis associated with syndrome status post multiple TMJ surgeries. Patient tolerated procedure well, minimal bleeding was noted, will continue to observe and follow-up in 1 week. Complications: none     Blood Loss: Min.      Disposition: home

## 2024-01-19 DIAGNOSIS — R63.5 WEIGHT GAIN: ICD-10-CM

## 2024-01-19 DIAGNOSIS — E03.8 OTHER SPECIFIED HYPOTHYROIDISM: ICD-10-CM

## 2024-01-19 DIAGNOSIS — E78.2 MIXED HYPERLIPIDEMIA: ICD-10-CM

## 2024-01-19 LAB
ABSOLUTE IMMATURE GRANULOCYTE: <0.03 K/UL (ref 0–0.58)
ALBUMIN SERPL-MCNC: 4.6 G/DL (ref 3.5–5.2)
ALP BLD-CCNC: 50 U/L (ref 35–104)
ALT SERPL-CCNC: 14 U/L (ref 0–32)
ANION GAP SERPL CALCULATED.3IONS-SCNC: 13 MMOL/L (ref 7–16)
AST SERPL-CCNC: 20 U/L (ref 0–31)
BASOPHILS ABSOLUTE: 0.05 K/UL (ref 0–0.2)
BASOPHILS RELATIVE PERCENT: 2 % (ref 0–2)
BILIRUB SERPL-MCNC: 0.4 MG/DL (ref 0–1.2)
BUN BLDV-MCNC: 10 MG/DL (ref 6–20)
CALCIUM SERPL-MCNC: 9.7 MG/DL (ref 8.6–10.2)
CHLORIDE BLD-SCNC: 105 MMOL/L (ref 98–107)
CHOLESTEROL: 220 MG/DL
CO2: 26 MMOL/L (ref 22–29)
CREAT SERPL-MCNC: 0.8 MG/DL (ref 0.5–1)
EOSINOPHILS ABSOLUTE: 0.12 K/UL (ref 0.05–0.5)
EOSINOPHILS RELATIVE PERCENT: 4 % (ref 0–6)
GFR SERPL CREATININE-BSD FRML MDRD: >60 ML/MIN/1.73M2
GLUCOSE BLD-MCNC: 87 MG/DL (ref 74–99)
HCT VFR BLD CALC: 37.7 % (ref 34–48)
HDLC SERPL-MCNC: 68 MG/DL
HEMOGLOBIN: 12.2 G/DL (ref 11.5–15.5)
IMMATURE GRANULOCYTES: 0 % (ref 0–5)
LDL CHOLESTEROL: 135 MG/DL
LYMPHOCYTES ABSOLUTE: 0.93 K/UL (ref 1.5–4)
LYMPHOCYTES RELATIVE PERCENT: 27 % (ref 20–42)
MCH RBC QN AUTO: 31.7 PG (ref 26–35)
MCHC RBC AUTO-ENTMCNC: 32.4 G/DL (ref 32–34.5)
MCV RBC AUTO: 97.9 FL (ref 80–99.9)
MONOCYTES ABSOLUTE: 0.29 K/UL (ref 0.1–0.95)
MONOCYTES RELATIVE PERCENT: 8 % (ref 2–12)
NEUTROPHILS ABSOLUTE: 2.05 K/UL (ref 1.8–7.3)
NEUTROPHILS RELATIVE PERCENT: 60 % (ref 43–80)
PDW BLD-RTO: 12.3 % (ref 11.5–15)
PLATELET # BLD: 238 K/UL (ref 130–450)
PMV BLD AUTO: 9.4 FL (ref 7–12)
POTASSIUM SERPL-SCNC: 4.2 MMOL/L (ref 3.5–5)
RBC # BLD: 3.85 M/UL (ref 3.5–5.5)
SODIUM BLD-SCNC: 144 MMOL/L (ref 132–146)
T4 TOTAL: 8.9 UG/DL (ref 4.5–11.7)
TOTAL PROTEIN: 7.1 G/DL (ref 6.4–8.3)
TRIGL SERPL-MCNC: 87 MG/DL
TSH SERPL DL<=0.05 MIU/L-ACNC: 1.3 UIU/ML (ref 0.27–4.2)
VLDLC SERPL CALC-MCNC: 17 MG/DL
WBC # BLD: 3.4 K/UL (ref 4.5–11.5)

## 2024-01-23 RX ORDER — ESOMEPRAZOLE MAGNESIUM 40 MG/1
CAPSULE, DELAYED RELEASE ORAL
Qty: 180 CAPSULE | Refills: 0 | Status: SHIPPED | OUTPATIENT
Start: 2024-01-23

## 2024-01-26 ENCOUNTER — OFFICE VISIT (OUTPATIENT)
Dept: PRIMARY CARE CLINIC | Age: 59
End: 2024-01-26
Payer: COMMERCIAL

## 2024-01-26 VITALS
OXYGEN SATURATION: 97 % | SYSTOLIC BLOOD PRESSURE: 110 MMHG | BODY MASS INDEX: 28.51 KG/M2 | DIASTOLIC BLOOD PRESSURE: 70 MMHG | HEART RATE: 70 BPM | HEIGHT: 64 IN | TEMPERATURE: 98.4 F | WEIGHT: 167 LBS

## 2024-01-26 DIAGNOSIS — E78.5 HYPERLIPIDEMIA, UNSPECIFIED HYPERLIPIDEMIA TYPE: ICD-10-CM

## 2024-01-26 DIAGNOSIS — F41.9 ANXIETY: ICD-10-CM

## 2024-01-26 DIAGNOSIS — E03.9 HYPOTHYROIDISM, UNSPECIFIED TYPE: Primary | ICD-10-CM

## 2024-01-26 DIAGNOSIS — K21.9 LARYNGOPHARYNGEAL REFLUX (LPR): ICD-10-CM

## 2024-01-26 PROCEDURE — 99214 OFFICE O/P EST MOD 30 MIN: CPT | Performed by: FAMILY MEDICINE

## 2024-01-26 NOTE — PROGRESS NOTES
24     Chelsie Stoddard    : 1965 Sex: female   Age: 58 y.o.      Chief Complaint   Patient presents with    Hypothyroidism    Discuss Labs       Prior to Admission medications    Medication Sig Start Date End Date Taking? Authorizing Provider   esomeprazole (NEXIUM) 40 MG delayed release capsule TAKE ONE CAPSULE BY MOUTH EVERY MORNING AND ONE CAPSULE BY MOUTH EVERY NIGHT AT BEDTIME 24  Yes Kishor Kern, DO   onabotulinumtoxinA (BOTOX) 100 units injection Please bring to office for injection for apt on 23  Yes Kulwant Pérez, DO   levothyroxine (SYNTHROID) 75 MCG tablet TAKE ONE TABLET BY MOUTH IN THE MORNING 23  Yes Kishor Kern, DO   FLUoxetine (PROZAC) 10 MG capsule TAKE 1 CAPSULE BY MOUTH 2 TIMES DAILY. 23  Yes Kishor Kern, DO   Multiple Vitamin (MULTIVITAMIN ADULT PO) Take by mouth   Yes Provider, MD Ina   Ascorbic Acid (FABI-C PO) Take by mouth daily   Yes Provider, MD Ina   estradiol (ESTRACE) 0.1 MG/GM vaginal cream Place vaginally as needed  12/15/20  Yes ProviderIna MD   Cholecalciferol (VITAMIN D-3) 5000 UNITS TABS Take by mouth daily   Yes ProviderIna MD          HPI:           Review of Systems           Current Outpatient Medications:     esomeprazole (NEXIUM) 40 MG delayed release capsule, TAKE ONE CAPSULE BY MOUTH EVERY MORNING AND ONE CAPSULE BY MOUTH EVERY NIGHT AT BEDTIME, Disp: 180 capsule, Rfl: 0    onabotulinumtoxinA (BOTOX) 100 units injection, Please bring to office for injection for apt on 23, Disp: 1 each, Rfl: 0    levothyroxine (SYNTHROID) 75 MCG tablet, TAKE ONE TABLET BY MOUTH IN THE MORNING, Disp: 90 tablet, Rfl: 3    FLUoxetine (PROZAC) 10 MG capsule, TAKE 1 CAPSULE BY MOUTH 2 TIMES DAILY., Disp: 180 capsule, Rfl: 3    Multiple Vitamin (MULTIVITAMIN ADULT PO), Take by mouth, Disp: , Rfl:     Ascorbic Acid (FABI-C PO), Take by mouth daily, Disp: , Rfl:     estradiol (ESTRACE) 0.1 
Date    WBC 3.4 (L) 01/19/2024    HGB 12.2 01/19/2024    HCT 37.7 01/19/2024    MCV 97.9 01/19/2024     01/19/2024    LYMPHOPCT 27 01/19/2024    RBC 3.85 01/19/2024    MCH 31.7 01/19/2024    MCHC 32.4 01/19/2024    RDW 12.3 01/19/2024     Lab Results   Component Value Date     01/19/2024    K 4.2 01/19/2024     01/19/2024    CO2 26 01/19/2024    BUN 10 01/19/2024    CREATININE 0.8 01/19/2024    GLUCOSE 87 01/19/2024    CALCIUM 9.7 01/19/2024    PROT 7.1 01/19/2024    LABALBU 4.6 01/19/2024    BILITOT 0.4 01/19/2024    ALKPHOS 50 01/19/2024    AST 20 01/19/2024    ALT 14 01/19/2024    LABGLOM >60 01/19/2024    GFRAA >60 04/01/2022      No results found for: \"PSA\", \"PSADIA\"   Lab Results   Component Value Date    LABA1C 5.0 03/23/2018    LABA1C 5.7 03/17/2014    LABA1C 5.6 01/10/2014     No results found for: \"EAG\"          Plan Per Assessment:  Chelsie was seen today for hypothyroidism and discuss labs.    Diagnoses and all orders for this visit:    Hypothyroidism, unspecified type    Hyperlipidemia, unspecified hyperlipidemia type    Anxiety    Laryngopharyngeal reflux (LPR)            Return in about 6 months (around 7/26/2024) for phys.      Kishor Kern,     Note was generated with the assistance of voice recognition software.  Document was reviewed however may contain grammatical errors.

## 2024-02-09 ENCOUNTER — HOSPITAL ENCOUNTER (OUTPATIENT)
Dept: GENERAL RADIOLOGY | Age: 59
Discharge: HOME | End: 2024-02-09
Payer: COMMERCIAL

## 2024-02-09 DIAGNOSIS — Z13.820 SCREENING FOR OSTEOPOROSIS: ICD-10-CM

## 2024-02-09 PROCEDURE — 77080 DXA BONE DENSITY AXIAL: CPT

## 2024-02-28 ENCOUNTER — TELEPHONE (OUTPATIENT)
Dept: ENT CLINIC | Age: 59
End: 2024-02-28

## 2024-02-28 NOTE — TELEPHONE ENCOUNTER
Received fax for refill on botox 100 units; [facility] will ship to [facility] office for 3/25/24 appointment.

## 2024-03-01 NOTE — TELEPHONE ENCOUNTER
Received call from Noreen with [facility] pharmacy; PA still valid from previous Rx; will ship rx and will be delivered to [place] office on 3/7/24

## 2024-03-25 ENCOUNTER — OFFICE VISIT (OUTPATIENT)
Dept: ENT CLINIC | Age: 59
End: 2024-03-25

## 2024-03-25 VITALS — BODY MASS INDEX: 28.51 KG/M2 | WEIGHT: 167 LBS | HEIGHT: 64 IN

## 2024-03-25 DIAGNOSIS — L74.52 FOCAL HYPERHIDROSIS DUE TO FREY SYNDROME: Primary | ICD-10-CM

## 2024-03-25 NOTE — PROGRESS NOTES
Op Note     Pre op diagnosis: Bilateral Kevin's syndrome     Post Op: Same     Procedure: Bilateral Botox injections for Kevin's syndrome     Anesthesia: None     Surgeon: Beto     Procedure Note: Patient was appropriate consented, positioned and the areas of hyperhidrosis were noted by the patient herself, and then using a mixture of 2.5 units per 0.1 cc approximately 27 units total were injected bilaterally for hyperhidrosis associated with syndrome status post multiple TMJ surgeries.  Patient tolerated procedure well, minimal bleeding was noted, will continue to observe and follow-up in 1 week.     Complications: none     Blood Loss: Min.     Disposition: home    I, Bhavana Castañeda MA, am scribing for and in the presence of Kulwant Pérez DO. 3/25/24/4:04 PM EDT

## 2024-04-17 ENCOUNTER — OFFICE VISIT (OUTPATIENT)
Dept: PRIMARY CARE CLINIC | Age: 59
End: 2024-04-17
Payer: COMMERCIAL

## 2024-04-17 VITALS
HEART RATE: 73 BPM | WEIGHT: 161 LBS | TEMPERATURE: 97.2 F | DIASTOLIC BLOOD PRESSURE: 78 MMHG | SYSTOLIC BLOOD PRESSURE: 118 MMHG | BODY MASS INDEX: 27.64 KG/M2 | OXYGEN SATURATION: 98 %

## 2024-04-17 DIAGNOSIS — M25.562 ACUTE PAIN OF LEFT KNEE: Primary | ICD-10-CM

## 2024-04-17 DIAGNOSIS — E78.00 PURE HYPERCHOLESTEROLEMIA: ICD-10-CM

## 2024-04-17 DIAGNOSIS — E03.8 OTHER SPECIFIED HYPOTHYROIDISM: ICD-10-CM

## 2024-04-17 DIAGNOSIS — F41.9 ANXIETY: ICD-10-CM

## 2024-04-17 PROCEDURE — 99214 OFFICE O/P EST MOD 30 MIN: CPT | Performed by: FAMILY MEDICINE

## 2024-04-17 RX ORDER — NAPROXEN 250 MG/1
250 TABLET ORAL 2 TIMES DAILY WITH MEALS
COMMUNITY

## 2024-04-17 RX ORDER — IBUPROFEN 800 MG/1
TABLET ORAL
Qty: 60 TABLET | Refills: 1 | Status: SHIPPED | OUTPATIENT
Start: 2024-04-17

## 2024-04-17 ASSESSMENT — PATIENT HEALTH QUESTIONNAIRE - PHQ9
2. FEELING DOWN, DEPRESSED OR HOPELESS: NOT AT ALL
9. THOUGHTS THAT YOU WOULD BE BETTER OFF DEAD, OR OF HURTING YOURSELF: NOT AT ALL
SUM OF ALL RESPONSES TO PHQ QUESTIONS 1-9: 0
8. MOVING OR SPEAKING SO SLOWLY THAT OTHER PEOPLE COULD HAVE NOTICED. OR THE OPPOSITE, BEING SO FIGETY OR RESTLESS THAT YOU HAVE BEEN MOVING AROUND A LOT MORE THAN USUAL: NOT AT ALL
7. TROUBLE CONCENTRATING ON THINGS, SUCH AS READING THE NEWSPAPER OR WATCHING TELEVISION: NOT AT ALL
3. TROUBLE FALLING OR STAYING ASLEEP: NOT AT ALL
1. LITTLE INTEREST OR PLEASURE IN DOING THINGS: NOT AT ALL
4. FEELING TIRED OR HAVING LITTLE ENERGY: NOT AT ALL
6. FEELING BAD ABOUT YOURSELF - OR THAT YOU ARE A FAILURE OR HAVE LET YOURSELF OR YOUR FAMILY DOWN: NOT AT ALL
SUM OF ALL RESPONSES TO PHQ QUESTIONS 1-9: 0
SUM OF ALL RESPONSES TO PHQ QUESTIONS 1-9: 0
5. POOR APPETITE OR OVEREATING: NOT AT ALL
10. IF YOU CHECKED OFF ANY PROBLEMS, HOW DIFFICULT HAVE THESE PROBLEMS MADE IT FOR YOU TO DO YOUR WORK, TAKE CARE OF THINGS AT HOME, OR GET ALONG WITH OTHER PEOPLE: NOT DIFFICULT AT ALL
SUM OF ALL RESPONSES TO PHQ9 QUESTIONS 1 & 2: 0
SUM OF ALL RESPONSES TO PHQ QUESTIONS 1-9: 0

## 2024-04-17 ASSESSMENT — ENCOUNTER SYMPTOMS
RESPIRATORY NEGATIVE: 1
ALLERGIC/IMMUNOLOGIC NEGATIVE: 1
EYES NEGATIVE: 1
GASTROINTESTINAL NEGATIVE: 1

## 2024-04-17 NOTE — PROGRESS NOTES
24     Chelsie Stoddard    : 1965 Sex: female   Age: 58 y.o.      Chief Complaint   Patient presents with    Knee Pain     Left knee  Fell down steps in [place]        Prior to Admission medications    Medication Sig Start Date End Date Taking? Authorizing Provider   naproxen (NAPROSYN) 250 MG tablet Take 1 tablet by mouth 2 times daily (with meals)   Yes Ina Bourgeois MD   ibuprofen (ADVIL;MOTRIN) 800 MG tablet 1 q12 with food 24  Yes Kishor Kern, DO   onabotulinumtoxinA (BOTOX) 100 units injection Please ship to office for injection for apt on 3/25/2024 2/28/24   Kulwant Pérez, DO   esomeprazole (NEXIUM) 40 MG delayed release capsule TAKE ONE CAPSULE BY MOUTH EVERY MORNING AND ONE CAPSULE BY MOUTH EVERY NIGHT AT BEDTIME 24   Kishor Kern, DO   levothyroxine (SYNTHROID) 75 MCG tablet TAKE ONE TABLET BY MOUTH IN THE MORNING 23   Kishor Kern, DO   FLUoxetine (PROZAC) 10 MG capsule TAKE 1 CAPSULE BY MOUTH 2 TIMES DAILY. 23   Kishor Kern, DO   Multiple Vitamin (MULTIVITAMIN ADULT PO) Take by mouth    Ina Bourgeois MD   Ascorbic Acid (FABI-C PO) Take by mouth daily    Ina Bourgeois MD   estradiol (ESTRACE) 0.1 MG/GM vaginal cream Place vaginally as needed  12/15/20   Ina Bourgeois MD   Cholecalciferol (VITAMIN D-3) 5000 UNITS TABS Take by mouth daily    ProviderIna MD          HPI: Chelsie is in today problems with left knee pain that has been ongoing since February.  Accidental fall down some stairs going outside at home.  Contusion injury at that time.  Continued difficulties with tenderness along the joint line.  We will plan x-ray studies to rule out bony abnormality but I do have concerns for medial meniscus as well.  Recommended some physical therapy and if we have continued or worsening symptoms orthopedic assessment.  She will see me back in a couple weeks.  Hyperlipidemia anxiety hypothyroidism reflux all stable.

## 2024-04-22 RX ORDER — ESOMEPRAZOLE MAGNESIUM 40 MG/1
CAPSULE, DELAYED RELEASE ORAL
Qty: 180 CAPSULE | Refills: 0 | Status: SHIPPED | OUTPATIENT
Start: 2024-04-22

## 2024-05-02 ENCOUNTER — OFFICE VISIT (OUTPATIENT)
Dept: PRIMARY CARE CLINIC | Age: 59
End: 2024-05-02
Payer: COMMERCIAL

## 2024-05-02 VITALS
SYSTOLIC BLOOD PRESSURE: 120 MMHG | OXYGEN SATURATION: 95 % | TEMPERATURE: 98 F | BODY MASS INDEX: 29.37 KG/M2 | WEIGHT: 172 LBS | DIASTOLIC BLOOD PRESSURE: 80 MMHG | HEIGHT: 64 IN | HEART RATE: 68 BPM

## 2024-05-02 DIAGNOSIS — E78.5 HYPERLIPIDEMIA, UNSPECIFIED HYPERLIPIDEMIA TYPE: ICD-10-CM

## 2024-05-02 DIAGNOSIS — E03.9 HYPOTHYROIDISM, UNSPECIFIED TYPE: ICD-10-CM

## 2024-05-02 DIAGNOSIS — M25.562 ACUTE PAIN OF LEFT KNEE: Primary | ICD-10-CM

## 2024-05-02 DIAGNOSIS — F41.9 ANXIETY: ICD-10-CM

## 2024-05-02 PROCEDURE — 99214 OFFICE O/P EST MOD 30 MIN: CPT | Performed by: FAMILY MEDICINE

## 2024-05-02 ASSESSMENT — ENCOUNTER SYMPTOMS
RESPIRATORY NEGATIVE: 1
GASTROINTESTINAL NEGATIVE: 1
ALLERGIC/IMMUNOLOGIC NEGATIVE: 1
EYES NEGATIVE: 1

## 2024-05-02 NOTE — PROGRESS NOTES
(SYNTHROID) 75 MCG tablet, TAKE ONE TABLET BY MOUTH IN THE MORNING, Disp: 90 tablet, Rfl: 3    FLUoxetine (PROZAC) 10 MG capsule, TAKE 1 CAPSULE BY MOUTH 2 TIMES DAILY., Disp: 180 capsule, Rfl: 3    Multiple Vitamin (MULTIVITAMIN ADULT PO), Take by mouth, Disp: , Rfl:     Ascorbic Acid (FABI-C PO), Take by mouth daily, Disp: , Rfl:     estradiol (ESTRACE) 0.1 MG/GM vaginal cream, Place vaginally as needed , Disp: , Rfl:     Cholecalciferol (VITAMIN D-3) 5000 UNITS TABS, Take by mouth daily, Disp: , Rfl:     Allergies   Allergen Reactions    Latex Hives and Rash    Prednisone        Social History     Tobacco Use    Smoking status: Never    Smokeless tobacco: Never   Vaping Use    Vaping Use: Never used   Substance Use Topics    Alcohol use: Yes     Comment: social    Drug use: No      Past Surgical History:   Procedure Laterality Date    COLONOSCOPY  1/2015    DILATION AND CURETTAGE OF UTERUS  2007, 2006    HYSTERECTOMY (CERVIX STATUS UNKNOWN)  2008    MANDIBLE SURGERY  1997, 1986, 1987, 1990    x 4    OTHER SURGICAL HISTORY  10/8/15    bilateral tmj arthrodesis/steroid injection    UPPER GASTROINTESTINAL ENDOSCOPY  12/17/2014    UPPER GASTROINTESTINAL ENDOSCOPY  04/12/2017    UPPER GASTROINTESTINAL ENDOSCOPY N/A 7/13/2018    EGD BIOPSY performed by Tyesha Gale MD at [facility] ENDOSCOPY    UPPER GASTROINTESTINAL ENDOSCOPY N/A 8/18/2022    EGD BIOPSY performed by Travon Bowman MD at [facility] ENDOSCOPY    WRIST FRACTURE SURGERY Left 1/16/2019    LEFT WRIST OPEN REDUCTION INTERNAL FIXATION performed by Jevon Kyle DO at [facility] [place] [place] OR    WRIST FRACTURE SURGERY Left 7/19/2019    LEFT WRIST  REMOVAL OF HARDWARE performed by Jevon Kyle DO at [facility] [place] [place] OR     Family History   Problem Relation Age of Onset    High Blood Pressure Mother     High Cholesterol Mother     Diabetes Father     Obesity Father     Thyroid Disease Father     Stroke Father     High Cholesterol Father     High Blood Pressure Father     Heart

## 2024-05-02 NOTE — PROGRESS NOTES
Musculoskeletal: Negative.    Skin: Negative.    Allergic/Immunologic: Negative.    Neurological: Negative.    Hematological: Negative.    Psychiatric/Behavioral: Negative.        Today systems review overall stable aside from knee pain as noted.  Orthopedic assessment and possibly arrange for MRI study.  Prescription for physical therapy has been recommended and medical follow-up with me in July for her physical sooner if problems.        Current Outpatient Medications:     esomeprazole (NEXIUM) 40 MG delayed release capsule, TAKE ONE CAPSULE BY MOUTH TWICE A DAY EVERY MORNING AND EVERY NIGHT AT BEDTIME, Disp: 180 capsule, Rfl: 0    naproxen (NAPROSYN) 250 MG tablet, Take 1 tablet by mouth 2 times daily (with meals), Disp: , Rfl:     ibuprofen (ADVIL;MOTRIN) 800 MG tablet, 1 q12 with food, Disp: 60 tablet, Rfl: 1    onabotulinumtoxinA (BOTOX) 100 units injection, Please ship to office for injection for apt on 3/25/2024, Disp: 1 each, Rfl: 0    levothyroxine (SYNTHROID) 75 MCG tablet, TAKE ONE TABLET BY MOUTH IN THE MORNING, Disp: 90 tablet, Rfl: 3    FLUoxetine (PROZAC) 10 MG capsule, TAKE 1 CAPSULE BY MOUTH 2 TIMES DAILY., Disp: 180 capsule, Rfl: 3    Multiple Vitamin (MULTIVITAMIN ADULT PO), Take by mouth, Disp: , Rfl:     Ascorbic Acid (FABI-C PO), Take by mouth daily, Disp: , Rfl:     estradiol (ESTRACE) 0.1 MG/GM vaginal cream, Place vaginally as needed , Disp: , Rfl:     Cholecalciferol (VITAMIN D-3) 5000 UNITS TABS, Take by mouth daily, Disp: , Rfl:     Allergies   Allergen Reactions    Latex Hives and Rash    Prednisone        Social History     Tobacco Use    Smoking status: Never    Smokeless tobacco: Never   Vaping Use    Vaping Use: Never used   Substance Use Topics    Alcohol use: Yes     Comment: social    Drug use: No      Past Surgical History:   Procedure Laterality Date    COLONOSCOPY  1/2015    DILATION AND CURETTAGE OF UTERUS  2007, 2006    HYSTERECTOMY (CERVIX STATUS UNKNOWN)  2008

## 2024-05-15 ENCOUNTER — HOSPITAL ENCOUNTER (OUTPATIENT)
Dept: MRI IMAGING | Age: 59
Discharge: HOME | End: 2024-05-17
Payer: COMMERCIAL

## 2024-05-15 DIAGNOSIS — M25.562 ACUTE PAIN OF LEFT KNEE: ICD-10-CM

## 2024-05-15 PROCEDURE — 73721 MRI JNT OF LWR EXTRE W/O DYE: CPT

## 2024-05-30 ENCOUNTER — OFFICE VISIT (OUTPATIENT)
Dept: ORTHOPEDIC SURGERY | Age: 59
End: 2024-05-30
Payer: COMMERCIAL

## 2024-05-30 DIAGNOSIS — M17.12 PRIMARY OSTEOARTHRITIS OF LEFT KNEE: Primary | ICD-10-CM

## 2024-05-30 PROCEDURE — 20610 DRAIN/INJ JOINT/BURSA W/O US: CPT | Performed by: STUDENT IN AN ORGANIZED HEALTH CARE EDUCATION/TRAINING PROGRAM

## 2024-05-30 PROCEDURE — 99203 OFFICE O/P NEW LOW 30 MIN: CPT | Performed by: STUDENT IN AN ORGANIZED HEALTH CARE EDUCATION/TRAINING PROGRAM

## 2024-05-30 RX ORDER — BUPIVACAINE HYDROCHLORIDE 2.5 MG/ML
3 INJECTION, SOLUTION INFILTRATION; PERINEURAL ONCE
Status: COMPLETED | OUTPATIENT
Start: 2024-05-30 | End: 2024-05-30

## 2024-05-30 RX ORDER — TRIAMCINOLONE ACETONIDE 40 MG/ML
80 INJECTION, SUSPENSION INTRA-ARTICULAR; INTRAMUSCULAR ONCE
Status: COMPLETED | OUTPATIENT
Start: 2024-05-30 | End: 2024-05-30

## 2024-05-30 RX ADMIN — BUPIVACAINE HYDROCHLORIDE 7.5 MG: 2.5 INJECTION, SOLUTION INFILTRATION; PERINEURAL at 15:05

## 2024-05-30 RX ADMIN — TRIAMCINOLONE ACETONIDE 80 MG: 40 INJECTION, SUSPENSION INTRA-ARTICULAR; INTRAMUSCULAR at 15:05

## 2024-05-30 NOTE — PROGRESS NOTES
interpreted myself and discussed with the patient    Moderate amount of fluid in the medial collateral ligament bursa.  Clinical  correlation may be helpful to determine significance.  The MCL itself is  intact.     Joint effusion and synovitis.     Mild medial compartment arthrosis.     No internal derangement.    ASSESSMENT  Left knee medial compartment osteoarthritis with synovitis    PLAN  Steroid injection provided today.  Hopefully she can discontinue her ibuprofen once it starts to kick in.  I explained arthritis and how this can become progressive.  We can do these injections every 3 months if she wishes and they are effective.  She can follow-up on as-needed basis    Left knee Steroid Injection    The left knee identified as the injection site. The risk and benefits of a cortisone injection were explained and the patient consented to the injection. Under sterile conditions,the left  knee was injected with a mixture of 80  mg of Kenalog and 3 mL of 0.25% Marcaine without complication. A sterile bandage was applied.               Erik Talbot DO   Orthopaedic Surgery   5/30/24  2:56 PM    Note: This report was completed using computerOrderGroove voiced recognition software.  Every effort has been made to ensure accuracy; however, inadvertent computerized transcription errors may be present.

## 2024-06-12 ENCOUNTER — HOSPITAL ENCOUNTER (OUTPATIENT)
Dept: PHYSICAL THERAPY | Age: 59
Setting detail: THERAPIES SERIES
Discharge: HOME | End: 2024-06-12
Payer: COMMERCIAL

## 2024-06-12 PROCEDURE — 97161 PT EVAL LOW COMPLEX 20 MIN: CPT | Performed by: PHYSICAL THERAPIST

## 2024-06-12 NOTE — PROGRESS NOTES
Physical Therapy: Initial Evaluation    Patient: Chelsie Stoddard (59 y.o. female)   Examination Date: 2024  Plan of Care Certification Period: 2024 to        :  1965 ;    Confirmed: Yes MRN: [id number]  CSN: [id number]   Insurance: Payor: [facility] / Plan: [employer] EMPLOYEES / Product Type: *No Product type* /   Insurance ID: [id number] - (Commercial) Secondary Insurance (if applicable):    Referring Physician: Kishor Kern DO     PCP: Kishor Kern DO Visits to Date/Visits Approved:     No Show/Cancelled Appts:   /       Medical Diagnosis: Pain in left knee [M25.562]    Treatment Diagnosis:       PERTINENT MEDICAL HISTORY           Medical History: Chart Reviewed: Yes   Past Medical History:   Diagnosis Date    Anemia     Anxiety     Celiac disease     Difficult intubation     Intubated successfully 2009 with Glidescope #3, 7.0 ETT.    Eating disorder 10 years ago    anorexia, Dr. Kern monitoring.    GERD (gastroesophageal reflux disease)     History of blood transfusion     Hypothyroidism     Latex allergy, contact dermatitis     PONV (postoperative nausea and vomiting)     TMJ (dislocation of temporomandibular joint)     Vitamin D deficiency     follows with Dr. Kern     Surgical History:   Past Surgical History:   Procedure Laterality Date    COLONOSCOPY  2015    DILATION AND CURETTAGE OF UTERUS  ,     HYSTERECTOMY (CERVIX STATUS UNKNOWN)  2008    MANDIBLE SURGERY  , , , 1990    x 4    OTHER SURGICAL HISTORY  10/8/15    bilateral tmj arthrodesis/steroid injection    UPPER GASTROINTESTINAL ENDOSCOPY  2014    UPPER GASTROINTESTINAL ENDOSCOPY  2017    UPPER GASTROINTESTINAL ENDOSCOPY N/A 2018    EGD BIOPSY performed by Tyesha Gale MD at [facility] ENDOSCOPY    UPPER GASTROINTESTINAL ENDOSCOPY N/A 2022    EGD BIOPSY performed by Travon Bowman MD at [facility] ENDOSCOPY    WRIST FRACTURE SURGERY Left 2019    LEFT WRIST OPEN

## 2024-06-25 RX ORDER — LEVOTHYROXINE SODIUM 0.07 MG/1
TABLET ORAL
Qty: 90 TABLET | Refills: 0 | Status: SHIPPED | OUTPATIENT
Start: 2024-06-25

## 2024-06-25 RX ORDER — FLUOXETINE 10 MG/1
CAPSULE ORAL
Qty: 180 CAPSULE | Refills: 0 | Status: SHIPPED | OUTPATIENT
Start: 2024-06-25

## 2024-07-08 ENCOUNTER — OFFICE VISIT (OUTPATIENT)
Dept: ENT CLINIC | Age: 59
End: 2024-07-08
Payer: COMMERCIAL

## 2024-07-08 VITALS — BODY MASS INDEX: 28.85 KG/M2 | HEIGHT: 64 IN | WEIGHT: 169 LBS

## 2024-07-08 DIAGNOSIS — L74.52 FOCAL HYPERHIDROSIS DUE TO FREY SYNDROME: Primary | ICD-10-CM

## 2024-07-08 PROCEDURE — 64611 CHEMODENERV SALIV GLANDS: CPT | Performed by: OTOLARYNGOLOGY

## 2024-07-15 RX ORDER — ESOMEPRAZOLE MAGNESIUM 40 MG/1
CAPSULE, DELAYED RELEASE ORAL
Qty: 180 CAPSULE | Refills: 0 | Status: SHIPPED | OUTPATIENT
Start: 2024-07-15

## 2024-07-26 ENCOUNTER — OFFICE VISIT (OUTPATIENT)
Dept: PRIMARY CARE CLINIC | Age: 59
End: 2024-07-26
Payer: COMMERCIAL

## 2024-07-26 VITALS
WEIGHT: 170 LBS | SYSTOLIC BLOOD PRESSURE: 104 MMHG | TEMPERATURE: 97.4 F | HEIGHT: 64 IN | DIASTOLIC BLOOD PRESSURE: 70 MMHG | HEART RATE: 74 BPM | BODY MASS INDEX: 29.02 KG/M2 | OXYGEN SATURATION: 98 %

## 2024-07-26 DIAGNOSIS — F41.9 ANXIETY: ICD-10-CM

## 2024-07-26 DIAGNOSIS — E78.5 HYPERLIPIDEMIA, UNSPECIFIED HYPERLIPIDEMIA TYPE: ICD-10-CM

## 2024-07-26 DIAGNOSIS — E03.9 HYPOTHYROIDISM, UNSPECIFIED TYPE: ICD-10-CM

## 2024-07-26 DIAGNOSIS — Z00.00 ANNUAL PHYSICAL EXAM: Primary | ICD-10-CM

## 2024-07-26 DIAGNOSIS — Z12.11 COLON CANCER SCREENING: ICD-10-CM

## 2024-07-26 DIAGNOSIS — E03.8 OTHER SPECIFIED HYPOTHYROIDISM: ICD-10-CM

## 2024-07-26 DIAGNOSIS — E78.2 MIXED HYPERLIPIDEMIA: ICD-10-CM

## 2024-07-26 DIAGNOSIS — K21.9 LARYNGOPHARYNGEAL REFLUX (LPR): ICD-10-CM

## 2024-07-26 DIAGNOSIS — R31.29 MICROSCOPIC HEMATURIA: ICD-10-CM

## 2024-07-26 DIAGNOSIS — M26.609 TMJ (TEMPOROMANDIBULAR JOINT SYNDROME): ICD-10-CM

## 2024-07-26 LAB
ALBUMIN: 4.3 G/DL (ref 3.5–5.2)
ALP BLD-CCNC: 51 U/L (ref 35–104)
ALT SERPL-CCNC: 15 U/L (ref 0–32)
ANION GAP SERPL CALCULATED.3IONS-SCNC: 13 MMOL/L (ref 7–16)
AST SERPL-CCNC: 22 U/L (ref 0–31)
BASOPHILS ABSOLUTE: 0.04 K/UL (ref 0–0.2)
BASOPHILS RELATIVE PERCENT: 1 % (ref 0–2)
BILIRUB SERPL-MCNC: 0.5 MG/DL (ref 0–1.2)
BILIRUBIN, POC: NORMAL
BLOOD URINE, POC: NORMAL
BUN BLDV-MCNC: 10 MG/DL (ref 6–23)
CALCIUM SERPL-MCNC: 9.4 MG/DL (ref 8.6–10.2)
CHLORIDE BLD-SCNC: 102 MMOL/L (ref 98–107)
CHOLESTEROL, TOTAL: 232 MG/DL
CLARITY, POC: CLEAR
CO2: 25 MMOL/L (ref 22–29)
COLOR, POC: YELLOW
CREAT SERPL-MCNC: 0.9 MG/DL (ref 0.5–1)
EOSINOPHILS ABSOLUTE: 0.13 K/UL (ref 0.05–0.5)
EOSINOPHILS RELATIVE PERCENT: 3 % (ref 0–6)
GFR, ESTIMATED: 75 ML/MIN/1.73M2
GLUCOSE BLD-MCNC: 90 MG/DL (ref 74–99)
GLUCOSE URINE, POC: NORMAL
HCT VFR BLD CALC: 36.6 % (ref 34–48)
HDLC SERPL-MCNC: 62 MG/DL
HEMOGLOBIN: 12.3 G/DL (ref 11.5–15.5)
IMMATURE GRANULOCYTES %: 1 % (ref 0–5)
IMMATURE GRANULOCYTES ABSOLUTE: <0.03 K/UL (ref 0–0.58)
KETONES, POC: NORMAL
LDL CHOLESTEROL: 156 MG/DL
LEUKOCYTE EST, POC: NORMAL
LYMPHOCYTES ABSOLUTE: 0.8 K/UL (ref 1.5–4)
LYMPHOCYTES RELATIVE PERCENT: 18 % (ref 20–42)
MCH RBC QN AUTO: 32.6 PG (ref 26–35)
MCHC RBC AUTO-ENTMCNC: 33.6 G/DL (ref 32–34.5)
MCV RBC AUTO: 97.1 FL (ref 80–99.9)
MONOCYTES ABSOLUTE: 0.41 K/UL (ref 0.1–0.95)
MONOCYTES RELATIVE PERCENT: 9 % (ref 2–12)
NEUTROPHILS ABSOLUTE: 2.95 K/UL (ref 1.8–7.3)
NEUTROPHILS RELATIVE PERCENT: 68 % (ref 43–80)
NITRITE, POC: NORMAL
PDW BLD-RTO: 12.5 % (ref 11.5–15)
PH, POC: 6.5
PLATELET # BLD: 217 K/UL (ref 130–450)
PMV BLD AUTO: 9.1 FL (ref 7–12)
POTASSIUM SERPL-SCNC: 4.5 MMOL/L (ref 3.5–5)
PROTEIN, POC: NORMAL
RBC # BLD: 3.77 M/UL (ref 3.5–5.5)
SODIUM BLD-SCNC: 140 MMOL/L (ref 132–146)
SPECIFIC GRAVITY, POC: 1.01
THYROXINE (T4): 9.6 UG/DL (ref 4.5–11.7)
TOTAL PROTEIN: 7.1 G/DL (ref 6.4–8.3)
TRIGL SERPL-MCNC: 70 MG/DL
TSH SERPL DL<=0.05 MIU/L-ACNC: 0.86 UIU/ML (ref 0.27–4.2)
UROBILINOGEN, POC: 0.2
VLDLC SERPL CALC-MCNC: 14 MG/DL
WBC # BLD: 4.4 K/UL (ref 4.5–11.5)

## 2024-07-26 PROCEDURE — 99396 PREV VISIT EST AGE 40-64: CPT | Performed by: FAMILY MEDICINE

## 2024-07-26 PROCEDURE — 93000 ELECTROCARDIOGRAM COMPLETE: CPT | Performed by: FAMILY MEDICINE

## 2024-07-26 PROCEDURE — 81002 URINALYSIS NONAUTO W/O SCOPE: CPT | Performed by: FAMILY MEDICINE

## 2024-07-26 RX ORDER — PREDNISONE 5 MG/1
TABLET ORAL
Qty: 30 TABLET | Refills: 0 | Status: SHIPPED | OUTPATIENT
Start: 2024-07-26

## 2024-07-26 RX ORDER — ESOMEPRAZOLE MAGNESIUM 40 MG/1
CAPSULE, DELAYED RELEASE ORAL
Qty: 180 CAPSULE | Refills: 1 | Status: SHIPPED | OUTPATIENT
Start: 2024-07-26

## 2024-07-26 RX ORDER — LEVOTHYROXINE SODIUM 0.07 MG/1
TABLET ORAL
Qty: 90 TABLET | Refills: 2 | Status: SHIPPED | OUTPATIENT
Start: 2024-07-26

## 2024-07-26 RX ORDER — FLUOXETINE 10 MG/1
CAPSULE ORAL
Qty: 180 CAPSULE | Refills: 2 | Status: SHIPPED | OUTPATIENT
Start: 2024-07-26

## 2024-07-26 RX ORDER — AZITHROMYCIN 250 MG/1
TABLET, FILM COATED ORAL
Qty: 1 PACKET | Refills: 0 | Status: SHIPPED | OUTPATIENT
Start: 2024-07-26

## 2024-07-26 ASSESSMENT — ENCOUNTER SYMPTOMS
GASTROINTESTINAL NEGATIVE: 1
EYES NEGATIVE: 1
RESPIRATORY NEGATIVE: 1
ALLERGIC/IMMUNOLOGIC NEGATIVE: 1

## 2024-07-26 NOTE — PROGRESS NOTES
Vaping Use: Never used   Substance Use Topics    Alcohol use: Yes     Comment: social    Drug use: No      Past Surgical History:   Procedure Laterality Date    COLONOSCOPY  1/2015    DILATION AND CURETTAGE OF UTERUS  2007, 2006    HYSTERECTOMY (CERVIX STATUS UNKNOWN)  2008    MANDIBLE SURGERY  1997, 1986, 1987, 1990    x 4    OTHER SURGICAL HISTORY  10/8/15    bilateral tmj arthrodesis/steroid injection    UPPER GASTROINTESTINAL ENDOSCOPY  12/17/2014    UPPER GASTROINTESTINAL ENDOSCOPY  04/12/2017    UPPER GASTROINTESTINAL ENDOSCOPY N/A 7/13/2018    EGD BIOPSY performed by Tyesha Gale MD at [facility] ENDOSCOPY    UPPER GASTROINTESTINAL ENDOSCOPY N/A 8/18/2022    EGD BIOPSY performed by Travon Bowman MD at [facility] ENDOSCOPY    WRIST FRACTURE SURGERY Left 1/16/2019    LEFT WRIST OPEN REDUCTION INTERNAL FIXATION performed by Jevon Kyle DO at [facility] OR    WRIST FRACTURE SURGERY Left 7/19/2019    LEFT WRIST  REMOVAL OF HARDWARE performed by Jevon Kyle DO at [facility] OR     Family History   Problem Relation Age of Onset    High Blood Pressure Mother     High Cholesterol Mother     Diabetes Father     Obesity Father     Thyroid Disease Father     Stroke Father     High Cholesterol Father     High Blood Pressure Father     Heart Disease Father         CHF    Kidney Disease Father     Heart Attack Father     Thyroid Disease Sister     Celiac Disease Sister     Thyroid Disease Brother     Diabetes Brother     High Blood Pressure Brother     Heart Disease Maternal Grandmother     High Blood Pressure Maternal Grandmother     Diabetes Maternal Grandmother     Diabetes Maternal Grandfather     High Blood Pressure Maternal Grandfather     Thyroid Disease Paternal Grandmother     Heart Disease Paternal Grandfather     Diabetes Maternal Aunt     Breast Cancer Maternal Aunt 75    Diabetes Maternal Uncle     Breast Cancer Maternal Cousin 50    Breast Cancer Maternal Cousin 50    Ovarian Cancer Maternal Cousin     Breast

## 2024-08-21 LAB — NON-GYN CYTOLOGY REPORT: NORMAL

## 2024-08-25 PROBLEM — Z00.00 ANNUAL PHYSICAL EXAM: Status: RESOLVED | Noted: 2019-09-13 | Resolved: 2024-08-25

## 2024-09-05 ENCOUNTER — OFFICE VISIT (OUTPATIENT)
Dept: SURGERY | Age: 59
End: 2024-09-05

## 2024-09-05 VITALS
RESPIRATION RATE: 18 BRPM | HEIGHT: 64 IN | WEIGHT: 169 LBS | DIASTOLIC BLOOD PRESSURE: 71 MMHG | OXYGEN SATURATION: 98 % | BODY MASS INDEX: 28.85 KG/M2 | TEMPERATURE: 97.7 F | SYSTOLIC BLOOD PRESSURE: 117 MMHG | HEART RATE: 60 BPM

## 2024-09-05 DIAGNOSIS — Z12.11 ENCOUNTER FOR SCREENING COLONOSCOPY: Primary | ICD-10-CM

## 2024-09-13 ENCOUNTER — PREP FOR PROCEDURE (OUTPATIENT)
Dept: SURGERY | Age: 59
End: 2024-09-13

## 2024-09-13 DIAGNOSIS — Z12.11 SCREENING FOR COLON CANCER: ICD-10-CM

## 2024-09-17 LAB
CHOLEST SERPL-MCNC: 214 MG/DL
GLUCOSE SERPL-MCNC: 88 MG/DL (ref 74–99)
HDLC SERPL-MCNC: 57 MG/DL
LDLC SERPL CALC-MCNC: 142 MG/DL
PATIENT FASTING?: YES
TRIGL SERPL-MCNC: 77 MG/DL
VLDLC SERPL CALC-MCNC: 15 MG/DL

## 2024-10-13 PROBLEM — Z12.11 SCREENING FOR COLON CANCER: Status: RESOLVED | Noted: 2024-09-13 | Resolved: 2024-10-13

## 2024-10-25 ENCOUNTER — OFFICE VISIT (OUTPATIENT)
Dept: PRIMARY CARE CLINIC | Age: 59
End: 2024-10-25

## 2024-10-25 VITALS
HEIGHT: 64 IN | HEART RATE: 88 BPM | SYSTOLIC BLOOD PRESSURE: 118 MMHG | OXYGEN SATURATION: 99 % | WEIGHT: 168 LBS | BODY MASS INDEX: 28.68 KG/M2 | TEMPERATURE: 97.7 F | DIASTOLIC BLOOD PRESSURE: 70 MMHG

## 2024-10-25 DIAGNOSIS — K90.0 CELIAC DISEASE: ICD-10-CM

## 2024-10-25 DIAGNOSIS — E03.8 OTHER SPECIFIED HYPOTHYROIDISM: ICD-10-CM

## 2024-10-25 DIAGNOSIS — R05.1 ACUTE COUGH: Primary | ICD-10-CM

## 2024-10-25 DIAGNOSIS — J04.0 LARYNGITIS: ICD-10-CM

## 2024-10-25 LAB
Lab: NORMAL
PERFORMING INSTRUMENT: NORMAL
QC PASS/FAIL: NORMAL
SARS-COV-2, POC: NORMAL

## 2024-10-25 RX ORDER — CEFDINIR 300 MG/1
300 CAPSULE ORAL 2 TIMES DAILY
Qty: 20 CAPSULE | Refills: 0 | Status: SHIPPED | OUTPATIENT
Start: 2024-10-25 | End: 2024-11-04

## 2024-10-25 RX ORDER — PREDNISONE 5 MG/1
TABLET ORAL
Qty: 30 TABLET | Refills: 0 | Status: SHIPPED | OUTPATIENT
Start: 2024-10-25

## 2024-10-25 ASSESSMENT — ENCOUNTER SYMPTOMS
COUGH: 1
ALLERGIC/IMMUNOLOGIC NEGATIVE: 1
EYES NEGATIVE: 1
GASTROINTESTINAL NEGATIVE: 1

## 2024-10-25 NOTE — PROGRESS NOTES
10/25/24     Chelsie Stoddard    : 1965 Sex: female   Age: 59 y.o.      Chief Complaint   Patient presents with    Cough    Pharyngitis       Prior to Admission medications    Medication Sig Start Date End Date Taking? Authorizing Provider   cefdinir (OMNICEF) 300 MG capsule Take 1 capsule by mouth 2 times daily for 10 days 10/25/24 11/4/24 Yes Kishor Kern DO   predniSONE (DELTASONE) 5 MG tablet 4 tablets daily for 3 days then 3 tablets daily for 3 days then 2 tablets daily for 3 days then 1 tablet daily for 3 days 10/25/24  Yes Kishor Kern DO   esomeprazole (NEXIUM) 40 MG delayed release capsule TAKE ONE CAPSULE BY MOUTH TWICE A DAY EVERY MORNING AND EVERY NIGHT AT BEDTIME 24  Yes Kishor Kern DO   FLUoxetine (PROZAC) 10 MG capsule TAKE ONE CAPSULE BY MOUTH TWICE A DAY 24  Yes Kishor Kern DO   estradiol (ESTRACE) 0.1 MG/GM vaginal cream Place vaginally as needed  12/15/20  Yes Ina Bourgeois MD   levothyroxine (SYNTHROID) 75 MCG tablet TAKE ONE TABLET BY MOUTH EVERY MORNING 24   Kishor Kern DO   ibuprofen (ADVIL;MOTRIN) 800 MG tablet 1 q12 with food  Patient not taking: Reported on 2024   Kishor Kern DO   onabotulinumtoxinA (BOTOX) 100 units injection Please ship to office for injection for apt on 3/25/2024  Patient not taking: Reported on 2024   Kulwant Pérez, DO   Ascorbic Acid (FABI-C PO) Take by mouth daily  Patient not taking: Reported on 2024    Ina Bourgeois MD   Cholecalciferol (VITAMIN D-3) 5000 UNITS TABS Take by mouth daily  Patient not taking: Reported on 2024    Ina Bourgeois MD          HPI: Patient evaluated today upper respiratory cough and congestion and some laryngitis associated.  We are going to treat with Omnicef and some prednisone and then if persistent symptoms call.  COVID testing negative.  History of celiac disease stable.  Hypothyroid stable.  Medications

## 2024-10-28 ENCOUNTER — TELEPHONE (OUTPATIENT)
Dept: PRIMARY CARE CLINIC | Age: 59
End: 2024-10-28

## 2024-10-28 RX ORDER — BENZONATATE 200 MG/1
CAPSULE ORAL
Qty: 30 CAPSULE | Refills: 0 | Status: SHIPPED | OUTPATIENT
Start: 2024-10-28

## 2024-10-28 NOTE — TELEPHONE ENCOUNTER
The pt was in to see you 10/25 for a cough and sore throat and was prescribed cefdinir and prednisone, she is calling to see if she can be prescribed a cough suppressant

## 2024-11-04 ENCOUNTER — TELEPHONE (OUTPATIENT)
Dept: ENT CLINIC | Age: 59
End: 2024-11-04

## 2024-11-04 NOTE — TELEPHONE ENCOUNTER
Ruthann called to request new script for patient botox injection. Patient I scheduled 11/11/24.     [facility]  P: [phone] opt. 2  F: [phone]

## 2024-11-08 NOTE — TELEPHONE ENCOUNTER
Called patient to advise that per pharmacy, script will not be available for Monday's appointment. The soonest that script will be in hand with the office is Tuesday. Requested a call back to discuss rescheduling.

## 2024-11-18 ENCOUNTER — OFFICE VISIT (OUTPATIENT)
Dept: ENT CLINIC | Age: 59
End: 2024-11-18
Payer: COMMERCIAL

## 2024-11-18 VITALS
HEART RATE: 62 BPM | DIASTOLIC BLOOD PRESSURE: 73 MMHG | SYSTOLIC BLOOD PRESSURE: 109 MMHG | TEMPERATURE: 96.6 F | WEIGHT: 165 LBS | OXYGEN SATURATION: 98 % | BODY MASS INDEX: 28.17 KG/M2 | HEIGHT: 64 IN

## 2024-11-18 DIAGNOSIS — L74.52 FOCAL HYPERHIDROSIS DUE TO FREY SYNDROME: Primary | ICD-10-CM

## 2024-11-18 PROCEDURE — 64615 CHEMODENERV MUSC MIGRAINE: CPT | Performed by: OTOLARYNGOLOGY

## 2024-12-13 ENCOUNTER — TELEPHONE (OUTPATIENT)
Dept: SURGERY | Age: 59
End: 2024-12-13

## 2024-12-13 NOTE — TELEPHONE ENCOUNTER
Prior Authorization Form:      DEMOGRAPHICS:                     Patient Name:  Chelsie Beasley  Patient :  1965            Insurance:  Payor: [facility] / Plan: [employer] EMPLOYEES / Product Type: *No Product type* /   Insurance ID Number:    Payer/Plan Subscr  Sex Relation Sub. Ins. ID Effective Group Num   1. [facility]* CHELSIE BEASLEY 1965 Female Self [id number]                                   [address] [address]         DIAGNOSIS & PROCEDURE:                       Procedure/Operation: Colonoscopy           CPT Code: 94489    Diagnosis:  Screening    ICD10 Code: Z12.11    Location:  [facility]    Surgeon:  Dr. Bowman    SCHEDULING INFORMATION:                          Date: 2025    Time: 11:30am              Anesthesia:  LMAC                                                       Status:  Outpatient        Special Comments:       Electronically signed by Guerita Chase MA on 2024 at 2:29 PM

## 2024-12-31 ENCOUNTER — HOSPITAL ENCOUNTER (OUTPATIENT)
Dept: GENERAL RADIOLOGY | Age: 59
Discharge: HOME | End: 2025-01-02
Payer: COMMERCIAL

## 2024-12-31 VITALS — WEIGHT: 165 LBS | BODY MASS INDEX: 28.17 KG/M2 | HEIGHT: 64 IN

## 2024-12-31 DIAGNOSIS — Z12.31 OTHER SCREENING MAMMOGRAM: ICD-10-CM

## 2024-12-31 PROCEDURE — 77063 BREAST TOMOSYNTHESIS BI: CPT

## 2025-01-20 RX ORDER — ESOMEPRAZOLE MAGNESIUM 40 MG/1
CAPSULE, DELAYED RELEASE ORAL
Qty: 180 CAPSULE | Refills: 1 | Status: SHIPPED | OUTPATIENT
Start: 2025-01-20

## 2025-02-07 PROBLEM — Z12.11 SCREENING FOR COLON CANCER: Status: ACTIVE | Noted: 2024-09-13

## 2025-02-07 NOTE — PROGRESS NOTES
Dr Yang notified of pt history of difficult intubation and pt statement that she carries a \"difficult intubation\" card from [facility]. No orders received.

## 2025-02-07 NOTE — PROGRESS NOTES
[facility] PRE-ADMISSION TESTING INSTRUCTIONS    The Preadmission Testing patient is instructed accordingly using the following criteria (check applicable):    ARRIVAL INSTRUCTIONS:  [x] Parking the day of Surgery is located in the Main Entrance lot.  Upon entering the door, make an immediate right to the surgery reception desk    [x] Bring photo ID and insurance card    [x] Bring in a copy of Living will or Durable Power of  papers.    [x] Please be sure to arrange for responsible adult to provide transportation to and from the hospital    [x] Please arrange for responsible adult to be with you for the 24 hour period post procedure due to having anesthesia    [x] If you awake am of surgery not feeling well or have temperature >100 please call [phone]    GENERAL INSTRUCTIONS:    [x] May have clear liquids until 4 hours prior to surgery. Examples include water, fruit juices (no pulp), jello, popsicles, black coffee or tea, 8 ounces . No milk  products               No gum, candy or mints.    [x] You may brush your teeth, but do not swallow any water    [x] Take medications as instructed with 1-2 oz of water    [x] Stop herbal supplements and vitamins 5 days prior to procedure    [] Follow preop dosing of blood thinners per physician instructions    [] Take 1/2 dose of evening insulin, but no insulin after midnight    [] No oral diabetic medications after midnight    [] If diabetic and have low blood sugar or feel symptomatic, take 1-2oz apple juice only    [] Bring inhalers day of surgery    [] Bring C-PAP/ Bi-Pap day of surgery    [] Bring urine specimen day of surgery    [x] Shower or bath with soap, lather and rinse well, AM of Surgery, no lotion, powders or creams to surgical site    [x] Follow bowel prep as instructed per surgeon    [x] No tobacco products within 24 hours of surgery     [x] No alcohol or illegal drug use within 24 hours of surgery.    [x] Jewelry, body

## 2025-02-13 ENCOUNTER — OFFICE VISIT (OUTPATIENT)
Dept: ORTHOPEDIC SURGERY | Age: 60
End: 2025-02-13
Payer: COMMERCIAL

## 2025-02-13 VITALS
OXYGEN SATURATION: 98 % | DIASTOLIC BLOOD PRESSURE: 73 MMHG | BODY MASS INDEX: 28.17 KG/M2 | HEIGHT: 64 IN | WEIGHT: 165 LBS | HEART RATE: 74 BPM | RESPIRATION RATE: 18 BRPM | SYSTOLIC BLOOD PRESSURE: 115 MMHG | TEMPERATURE: 97.9 F

## 2025-02-13 DIAGNOSIS — M17.12 PRIMARY OSTEOARTHRITIS OF LEFT KNEE: Primary | ICD-10-CM

## 2025-02-13 PROCEDURE — 20610 DRAIN/INJ JOINT/BURSA W/O US: CPT | Performed by: STUDENT IN AN ORGANIZED HEALTH CARE EDUCATION/TRAINING PROGRAM

## 2025-02-13 PROCEDURE — 99213 OFFICE O/P EST LOW 20 MIN: CPT | Performed by: STUDENT IN AN ORGANIZED HEALTH CARE EDUCATION/TRAINING PROGRAM

## 2025-02-13 RX ORDER — BUPIVACAINE HYDROCHLORIDE 2.5 MG/ML
3 INJECTION, SOLUTION INFILTRATION; PERINEURAL ONCE
Status: COMPLETED | OUTPATIENT
Start: 2025-02-13 | End: 2025-02-13

## 2025-02-13 RX ORDER — TRIAMCINOLONE ACETONIDE 40 MG/ML
80 INJECTION, SUSPENSION INTRA-ARTICULAR; INTRAMUSCULAR ONCE
Status: COMPLETED | OUTPATIENT
Start: 2025-02-13 | End: 2025-02-13

## 2025-02-13 RX ADMIN — BUPIVACAINE HYDROCHLORIDE 7.5 MG: 2.5 INJECTION, SOLUTION INFILTRATION; PERINEURAL at 09:56

## 2025-02-13 RX ADMIN — TRIAMCINOLONE ACETONIDE 80 MG: 40 INJECTION, SUSPENSION INTRA-ARTICULAR; INTRAMUSCULAR at 09:56

## 2025-02-13 NOTE — PROGRESS NOTES
CHIEF COMPLAINT:   Chief Complaint   Patient presents with    Knee Pain     Pt is here today following up for her left knee pain. She was last seen on 5/30 wee she received a cortisone injection to the knee.         HPI update 2/13/2025: Chelsie is here today for repeat injection in her left knee.  Her last injection was in May and she had long-term lasting relief until recently.  She has had no recent injuries.  She has had no recent falls.    HPI:    Chelsie Stoddard is a 59 y.o. year old female with multiple month history of left knee pain.  She has had x-rays and MRIs done by her primary care physician.  She has used Voltaren gel and 800 mg ibuprofen with some relief.  She complains of medial sided knee pain that is worse going up and down stairs.  She states she has been falling recently secondary to issues with her bifocal glasses.  She denies any mechanical symptoms.  Denies feelings of instability.  Has never had surgery on this knee.  Pain is sharp and nonradiating.  Exacerbated by activity and gets better with rest    PAST MEDICAL HISTORY  Past Medical History:   Diagnosis Date    Anemia     Anxiety     Celiac disease     Difficult intubation     Intubated successfully 1/2009 with Glidescope #3, 7.0 ETT.    Eating disorder 10 years ago    anorexia, Dr. Kern monitoring.    GERD (gastroesophageal reflux disease)     History of blood transfusion 1993    Hypothyroidism     Latex allergy, contact dermatitis     PONV (postoperative nausea and vomiting)     Screening due     TMJ (dislocation of temporomandibular joint)     Vitamin D deficiency     follows with Dr. Kern       PAST SURGICAL HISTORY  Past Surgical History:   Procedure Laterality Date    COLONOSCOPY  1/2015    DILATION AND CURETTAGE OF UTERUS  2007, 2006    HYSTERECTOMY (CERVIX STATUS UNKNOWN)  2008    MANDIBLE SURGERY  1997, 1986, 1987, 1990    x 4    OTHER SURGICAL HISTORY  10/8/15    bilateral tmj arthrodesis/steroid injection    UPPER

## 2025-02-14 ENCOUNTER — ANESTHESIA EVENT (OUTPATIENT)
Dept: ENDOSCOPY | Age: 60
End: 2025-02-14
Payer: COMMERCIAL

## 2025-02-14 ENCOUNTER — ANESTHESIA (OUTPATIENT)
Dept: ENDOSCOPY | Age: 60
End: 2025-02-14
Payer: COMMERCIAL

## 2025-02-14 ENCOUNTER — HOSPITAL ENCOUNTER (OUTPATIENT)
Age: 60
Setting detail: OUTPATIENT SURGERY
Discharge: HOME | End: 2025-02-14
Attending: SURGERY | Admitting: SURGERY
Payer: COMMERCIAL

## 2025-02-14 VITALS
TEMPERATURE: 97.1 F | WEIGHT: 165 LBS | DIASTOLIC BLOOD PRESSURE: 68 MMHG | HEART RATE: 74 BPM | HEIGHT: 64 IN | RESPIRATION RATE: 20 BRPM | SYSTOLIC BLOOD PRESSURE: 120 MMHG | BODY MASS INDEX: 28.17 KG/M2 | OXYGEN SATURATION: 97 %

## 2025-02-14 PROCEDURE — 3609027000 HC COLONOSCOPY: Performed by: SURGERY

## 2025-02-14 PROCEDURE — 7100000010 HC PHASE II RECOVERY - FIRST 15 MIN: Performed by: SURGERY

## 2025-02-14 PROCEDURE — 45378 DIAGNOSTIC COLONOSCOPY: CPT | Performed by: SURGERY

## 2025-02-14 PROCEDURE — 7100000011 HC PHASE II RECOVERY - ADDTL 15 MIN: Performed by: SURGERY

## 2025-02-14 PROCEDURE — 3700000001 HC ADD 15 MINUTES (ANESTHESIA): Performed by: SURGERY

## 2025-02-14 PROCEDURE — 6360000002 HC RX W HCPCS: Performed by: NURSE ANESTHETIST, CERTIFIED REGISTERED

## 2025-02-14 PROCEDURE — 3700000000 HC ANESTHESIA ATTENDED CARE: Performed by: SURGERY

## 2025-02-14 PROCEDURE — 2709999900 HC NON-CHARGEABLE SUPPLY: Performed by: SURGERY

## 2025-02-14 RX ORDER — ONDANSETRON 2 MG/ML
INJECTION INTRAMUSCULAR; INTRAVENOUS
Status: DISCONTINUED | OUTPATIENT
Start: 2025-02-14 | End: 2025-02-14 | Stop reason: SDUPTHER

## 2025-02-14 RX ORDER — PROPOFOL 10 MG/ML
INJECTION, EMULSION INTRAVENOUS
Status: DISCONTINUED | OUTPATIENT
Start: 2025-02-14 | End: 2025-02-14 | Stop reason: SDUPTHER

## 2025-02-14 RX ADMIN — PROPOFOL 240 MG: 10 INJECTION, EMULSION INTRAVENOUS at 11:47

## 2025-02-14 RX ADMIN — ONDANSETRON 4 MG: 2 INJECTION, SOLUTION INTRAMUSCULAR; INTRAVENOUS at 11:31

## 2025-02-14 ASSESSMENT — PAIN - FUNCTIONAL ASSESSMENT
PAIN_FUNCTIONAL_ASSESSMENT: NONE - DENIES PAIN
PAIN_FUNCTIONAL_ASSESSMENT: 0-10

## 2025-02-14 ASSESSMENT — LIFESTYLE VARIABLES: SMOKING_STATUS: 0

## 2025-02-14 NOTE — H&P
[facility] Surgery Clinic Note     Assessment/Plan:        Diagnosis Orders   1. Encounter for screening colonoscopy        We will plan for colonoscopy.                Return for Colonoscopy.             Chief Complaint   Patient presents with    Follow-up       Colonoscopy screening consult, 10 years since last one         PCP: Kishor Kern DO     HPI: Chelsie Stoddard is a 59 y.o. female who presents in consultation for colonoscopy.  Her last was 10 years ago with Dr Gale that she reports is normal.  She denies any issues.  She has no problems moving her bowels.  There is no blood in the stool.  There is no abdominal pain or unintentional weight loss.  There is no family history of colon cancer or inflammatory bowel disease.         Past Medical History        Past Medical History:   Diagnosis Date    Anemia      Anxiety      Celiac disease      Difficult intubation       Intubated successfully 1/2009 with Glidescope #3, 7.0 ETT.    Eating disorder 10 years ago     anorexia, Dr. Kern monitoring.    GERD (gastroesophageal reflux disease)      History of blood transfusion 1993    Hypothyroidism      Latex allergy, contact dermatitis      PONV (postoperative nausea and vomiting)      TMJ (dislocation of temporomandibular joint)      Vitamin D deficiency       follows with Dr. Kern            Past Surgical History         Past Surgical History:   Procedure Laterality Date    COLONOSCOPY   1/2015    DILATION AND CURETTAGE OF UTERUS   2007, 2006    HYSTERECTOMY (CERVIX STATUS UNKNOWN)   2008    MANDIBLE SURGERY   1997, 1986, 1987, 1990     x 4    OTHER SURGICAL HISTORY   10/8/15     bilateral tmj arthrodesis/steroid injection    UPPER GASTROINTESTINAL ENDOSCOPY   12/17/2014    UPPER GASTROINTESTINAL ENDOSCOPY   04/12/2017    UPPER GASTROINTESTINAL ENDOSCOPY N/A 7/13/2018     EGD BIOPSY performed by Tyesha Gale MD at [facility] ENDOSCOPY    UPPER GASTROINTESTINAL ENDOSCOPY N/A 8/18/2022     EGD BIOPSY  MD   Ascorbic Acid (FABI-C PO) Take by mouth daily  Patient not taking: Reported on 9/5/2024       Ina Bourgeois MD   Cholecalciferol (VITAMIN D-3) 5000 UNITS TABS Take by mouth daily  Patient not taking: Reported on 9/5/2024       Ina Bourgeois MD            Allergies        Allergies   Allergen Reactions    Latex Hives and Rash            Social History   Social History            Socioeconomic History    Marital status:        Spouse name: None    Number of children: None    Years of education: None    Highest education level: None   Tobacco Use    Smoking status: Never    Smokeless tobacco: Never   Vaping Use    Vaping status: Never Used   Substance and Sexual Activity    Alcohol use: Yes       Comment: social    Drug use: No      Social Determinants of Health           Financial Resource Strain: Low Risk  (9/25/2023)     Overall Financial Resource Strain (CARDIA)      Difficulty of Paying Living Expenses: Not hard at all   Transportation Needs: Unknown (9/25/2023)     PRAPARE - Transportation      Lack of Transportation (Non-Medical): No   Housing Stability: Unknown (9/25/2023)     Housing Stability Vital Sign      Unstable Housing in the Last Year: No            Family History         Family History   Problem Relation Age of Onset    High Blood Pressure Mother      High Cholesterol Mother      Diabetes Father      Obesity Father      Thyroid Disease Father      Stroke Father      High Cholesterol Father      High Blood Pressure Father      Heart Disease Father           CHF    Kidney Disease Father      Heart Attack Father      Thyroid Disease Sister      Celiac Disease Sister      Thyroid Disease Brother      Diabetes Brother      High Blood Pressure Brother      Heart Disease Maternal Grandmother      High Blood Pressure Maternal Grandmother      Diabetes Maternal Grandmother      Diabetes Maternal Grandfather      High Blood Pressure Maternal Grandfather      Thyroid Disease Paternal

## 2025-02-14 NOTE — ANESTHESIA PRE PROCEDURE
BP Readings from Last 3 Encounters:   02/13/25 115/73   11/18/24 109/73   10/25/24 118/70       NPO Status:                                                                                 BMI:   Wt Readings from Last 3 Encounters:   02/13/25 74.8 kg (165 lb)   12/31/24 74.8 kg (165 lb)   11/18/24 74.8 kg (165 lb)     Body mass index is 28.32 kg/m².    CBC:   Lab Results   Component Value Date/Time    WBC 4.4 07/26/2024 07:55 AM    RBC 3.77 07/26/2024 07:55 AM    HGB 12.3 07/26/2024 07:55 AM    HCT 36.6 07/26/2024 07:55 AM    MCV 97.1 07/26/2024 07:55 AM    RDW 12.5 07/26/2024 07:55 AM     07/26/2024 07:55 AM       CMP:   Lab Results   Component Value Date/Time     07/26/2024 07:55 AM    K 4.5 07/26/2024 07:55 AM     07/26/2024 07:55 AM    CO2 25 07/26/2024 07:55 AM    BUN 10 07/26/2024 07:55 AM    CREATININE 0.9 07/26/2024 07:55 AM    GFRAA >60 04/01/2022 09:03 AM    LABGLOM 75 07/26/2024 07:55 AM    LABGLOM >60 01/19/2024 09:59 AM    GLUCOSE 88 09/17/2024 09:00 AM    GLUCOSE 96 03/31/2012 12:04 PM    CALCIUM 9.4 07/26/2024 07:55 AM    BILITOT 0.5 07/26/2024 07:55 AM    ALKPHOS 51 07/26/2024 07:55 AM    AST 22 07/26/2024 07:55 AM    ALT 15 07/26/2024 07:55 AM       POC Tests: No results for input(s): \"POCGLU\", \"POCNA\", \"POCK\", \"POCCL\", \"POCBUN\", \"POCHEMO\", \"POCHCT\" in the last 72 hours.    Coags:   Lab Results   Component Value Date/Time    PROTIME 11.9 01/16/2019 10:45 AM    INR 1.0 01/16/2019 10:45 AM    APTT 27.9 01/16/2019 10:45 AM       HCG (If Applicable): No results found for: \"PREGTESTUR\", \"PREGSERUM\", \"HCG\", \"HCGQUANT\"     ABGs: No results found for: \"PHART\", \"PO2ART\", \"RQH0HSA\", \"BWF5TIK\", \"BEART\", \"Q8MJAIMJ\"     Type & Screen (If Applicable):  Lab Results   Component Value Date    ABORH O POS 01/16/2019    LABANTI NEG 01/16/2019       Drug/Infectious Status (If Applicable):  No results found for: \"HIV\", \"HEPCAB\"    COVID-19 Screening (If Applicable):   Lab Results

## 2025-02-14 NOTE — ANESTHESIA POSTPROCEDURE EVALUATION
Department of Anesthesiology  Postprocedure Note    Patient: Chelsie Stoddard  MRN: [id number]  YOB: 1965  Date of evaluation: 2/14/2025    Procedure Summary       Date: 02/14/25 Room / Location: [place] [address] / [facility]    Anesthesia Start: 1127 Anesthesia Stop: 1150    Procedure: COLORECTAL CANCER SCREENING, NOT HIGH RISK   +++LATEX ALLERGY+++   (HOLD TIME 1130AM) Diagnosis:       Screening for colon cancer      (Screening for colon cancer [Z12.11])    Surgeons: Travon Bowman MD Responsible Provider: Юлия Dee DO    Anesthesia Type: MAC ASA Status: 2            Anesthesia Type: No value filed.    Soni Phase I: Soni Score: 10    Soni Phase II:      Anesthesia Post Evaluation    Patient location during evaluation: PACU  Patient participation: complete - patient participated  Level of consciousness: awake and alert  Nausea & Vomiting: no vomiting and no nausea  Cardiovascular status: hemodynamically stable  Respiratory status: acceptable and spontaneous ventilation  Hydration status: stable  Pain management: adequate    No notable events documented.

## 2025-03-07 ENCOUNTER — TELEPHONE (OUTPATIENT)
Dept: ENT CLINIC | Age: 60
End: 2025-03-07

## 2025-03-07 NOTE — TELEPHONE ENCOUNTER
Ruthann with [facility] pharmacy called in regards to setting up delivery for patients botox order.  Requested a call back at [phone] opt 2.    Call as returned and delivery is set for 3/11/25.

## 2025-03-09 PROBLEM — Z12.11 SCREENING FOR COLON CANCER: Status: RESOLVED | Noted: 2024-09-13 | Resolved: 2025-03-09

## 2025-03-14 ENCOUNTER — OFFICE VISIT (OUTPATIENT)
Dept: PRIMARY CARE CLINIC | Age: 60
End: 2025-03-14

## 2025-03-14 ENCOUNTER — RESULTS FOLLOW-UP (OUTPATIENT)
Dept: PRIMARY CARE CLINIC | Age: 60
End: 2025-03-14

## 2025-03-14 VITALS
BODY MASS INDEX: 28.17 KG/M2 | OXYGEN SATURATION: 99 % | TEMPERATURE: 98.1 F | HEIGHT: 64 IN | SYSTOLIC BLOOD PRESSURE: 126 MMHG | WEIGHT: 165 LBS | DIASTOLIC BLOOD PRESSURE: 80 MMHG | HEART RATE: 76 BPM

## 2025-03-14 DIAGNOSIS — E03.8 OTHER SPECIFIED HYPOTHYROIDISM: ICD-10-CM

## 2025-03-14 DIAGNOSIS — K90.0 CELIAC DISEASE: ICD-10-CM

## 2025-03-14 DIAGNOSIS — J02.9 SORE THROAT: Primary | ICD-10-CM

## 2025-03-14 DIAGNOSIS — E78.2 MIXED HYPERLIPIDEMIA: ICD-10-CM

## 2025-03-14 DIAGNOSIS — R74.8 LIVER ENZYME ELEVATION: Primary | ICD-10-CM

## 2025-03-14 DIAGNOSIS — R09.81 NASAL CONGESTION: ICD-10-CM

## 2025-03-14 DIAGNOSIS — R05.1 ACUTE COUGH: ICD-10-CM

## 2025-03-14 LAB
ALBUMIN: 4.4 G/DL (ref 3.5–5.2)
ALP BLD-CCNC: 96 U/L (ref 35–104)
ALT SERPL-CCNC: 46 U/L (ref 0–32)
ANION GAP SERPL CALCULATED.3IONS-SCNC: 19 MMOL/L (ref 7–16)
AST SERPL-CCNC: 41 U/L (ref 0–31)
BASOPHILS ABSOLUTE: 0.05 K/UL (ref 0–0.2)
BASOPHILS RELATIVE PERCENT: 1 % (ref 0–2)
BILIRUB SERPL-MCNC: 0.3 MG/DL (ref 0–1.2)
BUN BLDV-MCNC: 8 MG/DL (ref 6–23)
CALCIUM SERPL-MCNC: 10 MG/DL (ref 8.6–10.2)
CHLORIDE BLD-SCNC: 102 MMOL/L (ref 98–107)
CHOLESTEROL, TOTAL: 210 MG/DL
CO2: 22 MMOL/L (ref 22–29)
CREAT SERPL-MCNC: 0.8 MG/DL (ref 0.5–1)
EOSINOPHILS ABSOLUTE: 0.26 K/UL (ref 0.05–0.5)
EOSINOPHILS RELATIVE PERCENT: 5 % (ref 0–6)
GFR, ESTIMATED: 88 ML/MIN/1.73M2
GLUCOSE BLD-MCNC: 98 MG/DL (ref 74–99)
HCT VFR BLD CALC: 38.3 % (ref 34–48)
HDLC SERPL-MCNC: 70 MG/DL
HEMOGLOBIN: 12.6 G/DL (ref 11.5–15.5)
IMMATURE GRANULOCYTES %: 0 % (ref 0–5)
IMMATURE GRANULOCYTES ABSOLUTE: <0.03 K/UL (ref 0–0.58)
LDL CHOLESTEROL: 129 MG/DL
LYMPHOCYTES ABSOLUTE: 0.93 K/UL (ref 1.5–4)
LYMPHOCYTES RELATIVE PERCENT: 17 % (ref 20–42)
Lab: NORMAL
MCH RBC QN AUTO: 32.2 PG (ref 26–35)
MCHC RBC AUTO-ENTMCNC: 32.9 G/DL (ref 32–34.5)
MCV RBC AUTO: 98 FL (ref 80–99.9)
MONOCYTES ABSOLUTE: 0.43 K/UL (ref 0.1–0.95)
MONOCYTES RELATIVE PERCENT: 8 % (ref 2–12)
NEUTROPHILS ABSOLUTE: 3.78 K/UL (ref 1.8–7.3)
NEUTROPHILS RELATIVE PERCENT: 69 % (ref 43–80)
PDW BLD-RTO: 12.6 % (ref 11.5–15)
PERFORMING INSTRUMENT: NORMAL
PLATELET # BLD: 301 K/UL (ref 130–450)
PMV BLD AUTO: 9.1 FL (ref 7–12)
POTASSIUM SERPL-SCNC: 4.9 MMOL/L (ref 3.5–5)
QC PASS/FAIL: NORMAL
RBC # BLD: 3.91 M/UL (ref 3.5–5.5)
SARS-COV-2, POC: NORMAL
SODIUM BLD-SCNC: 143 MMOL/L (ref 132–146)
THYROXINE (T4): 10.8 UG/DL (ref 4.5–11.7)
TOTAL PROTEIN: 7.2 G/DL (ref 6.4–8.3)
TRIGL SERPL-MCNC: 54 MG/DL
TSH SERPL DL<=0.05 MIU/L-ACNC: 0.95 UIU/ML (ref 0.27–4.2)
VLDLC SERPL CALC-MCNC: 11 MG/DL
WBC # BLD: 5.5 K/UL (ref 4.5–11.5)

## 2025-03-14 ASSESSMENT — ENCOUNTER SYMPTOMS
SINUS PAIN: 1
EYES NEGATIVE: 1
GASTROINTESTINAL NEGATIVE: 1
RESPIRATORY NEGATIVE: 1
ALLERGIC/IMMUNOLOGIC NEGATIVE: 1
SINUS PRESSURE: 1

## 2025-03-14 NOTE — PROGRESS NOTES
3/14/25     Chelsie Stoddard    : 1965 Sex: female   Age: 59 y.o.      Chief Complaint   Patient presents with    Congestion       Prior to Admission medications    Medication Sig Start Date End Date Taking? Authorizing Provider   amoxicillin-clavulanate (AUGMENTIN) 875-125 MG per tablet Take 1 tablet by mouth 2 times daily for 10 days 3/14/25 3/24/25 Yes Kishor Kern, DO   onabotulinumtoxinA (BOTOX) 100 units injection Please ship to office for injection for apt on 3/25/2024 3/5/25   Kulwant Pérez, DO   esomeprazole (NEXIUM) 40 MG delayed release capsule TAKE ONE CAPSULE BY MOUTH IN THE MORNING AND TAKE ONE CAPSULE BY MOUTH AT BEDTIME 25   Kishor Kern, DO   benzonatate (TESSALON) 200 MG capsule 1 every 8 hours as needed for cough 10/28/24   Kishor Kern DO   FLUoxetine (PROZAC) 10 MG capsule TAKE ONE CAPSULE BY MOUTH TWICE A DAY 24   Kishor Kern DO   levothyroxine (SYNTHROID) 75 MCG tablet TAKE ONE TABLET BY MOUTH EVERY MORNING 24   Kishor Kern DO   ibuprofen (ADVIL;MOTRIN) 800 MG tablet 1 q12 with food 24   Kishor Kern DO   Ascorbic Acid (FABI-C PO) Take by mouth daily    ProviderIna MD   estradiol (ESTRACE) 0.1 MG/GM vaginal cream Place vaginally as needed  12/15/20   ProviderIna MD   Cholecalciferol (VITAMIN D-3) 5000 UNITS TABS Take by mouth daily    ProviderIna MD          HPI: Patient seen today sore throat nasal congestion cough hyperlipidemia hypothyroid.  Medically overall doing well she did have blood work today we will follow-up and review.  COVID testing today is negative.  She does have respiratory drainage congestion we will treat with some Augmentin twice a day with food along with continuing Tessalon Perles as needed and simply saline as needed.          Review of Systems   Constitutional: Negative.    HENT:  Positive for congestion, sinus pressure and sinus pain.    Eyes: Negative.

## 2025-03-24 ENCOUNTER — TELEPHONE (OUTPATIENT)
Dept: ENT CLINIC | Age: 60
End: 2025-03-24

## 2025-03-24 ENCOUNTER — PROCEDURE VISIT (OUTPATIENT)
Dept: ENT CLINIC | Age: 60
End: 2025-03-24
Payer: COMMERCIAL

## 2025-03-24 VITALS
HEART RATE: 76 BPM | SYSTOLIC BLOOD PRESSURE: 112 MMHG | WEIGHT: 164 LBS | TEMPERATURE: 97.5 F | OXYGEN SATURATION: 97 % | DIASTOLIC BLOOD PRESSURE: 72 MMHG | HEIGHT: 64 IN | BODY MASS INDEX: 28 KG/M2

## 2025-03-24 DIAGNOSIS — L74.52 FOCAL HYPERHIDROSIS DUE TO FREY SYNDROME: Primary | ICD-10-CM

## 2025-03-24 PROCEDURE — 64612 DESTROY NERVE FACE MUSCLE: CPT | Performed by: OTOLARYNGOLOGY

## 2025-03-27 NOTE — PROGRESS NOTES
Op Note     Pre op diagnosis: Bilateral Kevin's syndrome     Post Op: Same     Procedure: Bilateral Botox injections for Kevin's syndrome     Anesthesia: None     Surgeon: Beto     Procedure Note: Patient was appropriate consented, positioned and the areas of hyperhidrosis were noted by the patient herself, and then using a mixture of 2.5 units per 0.1 cc approximately 27 units total were injected bilaterally for hyperhidrosis associated with syndrome status post multiple TMJ surgeries.  Patient tolerated procedure well, minimal bleeding was noted, will continue to observe and follow-up in 1 week.     Complications: none     Blood Loss: Min.     Disposition: home

## 2025-04-13 PROBLEM — J02.9 SORE THROAT: Status: RESOLVED | Noted: 2025-03-14 | Resolved: 2025-04-13

## 2025-04-23 ENCOUNTER — OFFICE VISIT (OUTPATIENT)
Dept: PRIMARY CARE CLINIC | Age: 60
End: 2025-04-23
Payer: COMMERCIAL

## 2025-04-23 VITALS
SYSTOLIC BLOOD PRESSURE: 103 MMHG | DIASTOLIC BLOOD PRESSURE: 67 MMHG | HEIGHT: 64 IN | HEART RATE: 73 BPM | RESPIRATION RATE: 18 BRPM | TEMPERATURE: 97.4 F | BODY MASS INDEX: 28.41 KG/M2 | OXYGEN SATURATION: 98 % | WEIGHT: 166.4 LBS

## 2025-04-23 DIAGNOSIS — R05.8 COUGH PRODUCTIVE OF YELLOW SPUTUM: ICD-10-CM

## 2025-04-23 DIAGNOSIS — B96.89 ACUTE BACTERIAL SINUSITIS: Primary | ICD-10-CM

## 2025-04-23 DIAGNOSIS — J01.90 ACUTE BACTERIAL SINUSITIS: Primary | ICD-10-CM

## 2025-04-23 PROCEDURE — 99213 OFFICE O/P EST LOW 20 MIN: CPT | Performed by: NURSE PRACTITIONER

## 2025-04-23 RX ORDER — DEXTROMETHORPHAN HYDROBROMIDE AND PROMETHAZINE HYDROCHLORIDE 15; 6.25 MG/5ML; MG/5ML
5 SYRUP ORAL 4 TIMES DAILY PRN
Qty: 180 ML | Refills: 0 | Status: SHIPPED | OUTPATIENT
Start: 2025-04-23 | End: 2025-04-30

## 2025-04-23 RX ORDER — PREDNISONE 10 MG/1
10 TABLET ORAL 2 TIMES DAILY
Qty: 10 TABLET | Refills: 0 | Status: SHIPPED | OUTPATIENT
Start: 2025-04-23 | End: 2025-04-28

## 2025-04-23 RX ORDER — AZITHROMYCIN 250 MG/1
TABLET, FILM COATED ORAL
Qty: 6 TABLET | Refills: 0 | Status: SHIPPED | OUTPATIENT
Start: 2025-04-23 | End: 2025-05-03

## 2025-04-23 NOTE — PROGRESS NOTES
Chief Complaint:   Cough (Since Saturday--real deep), Congestion, and sinus drainage      History of Present Illness   Source of history provided by:  patient.      Chelsie Stoddard is a 59 y.o. old female with a past medical history of:   Past Medical History:   Diagnosis Date    Anemia     Anxiety     Celiac disease     Difficult intubation     Intubated successfully 1/2009 with Glidescope #3, 7.0 ETT.    Eating disorder 10 years ago    anorexia, Dr. Kern monitoring.    GERD (gastroesophageal reflux disease)     History of blood transfusion 1993    Hypothyroidism     Latex allergy, contact dermatitis     PONV (postoperative nausea and vomiting)     Screening due     TMJ (dislocation of temporomandibular joint)     Vitamin D deficiency     follows with Dr. Kern        [facility] with a cough/congestion/sinus pressure for the past several days.  States the cough is  productive with yellow sputum.  No fever noted.    Denies any N/V/D, abdominal pain, CP, progressive SOB, dizziness, or lethargy.       ROS    Unless otherwise stated in this report or unable to obtain because of the patient's clinical or mental status as evidenced by the medical record, this patients's positive and negative responses for Review of Systems, constitutional, psych, eyes, ENT, cardiovascular, respiratory, gastrointestinal, neurological, genitourinary, musculoskeletal, integument systems and systems related to the presenting problem are either stated in the preceding or were not pertinent or were negative for the symptoms and/or complaints related to the medical problem.    Past Surgical History:  has a past surgical history that includes Hysterectomy (2008); Dilation and curettage of uterus (2007, 2006); Mandible surgery (1997, 1986, 1987, 1990); Upper gastrointestinal endoscopy (12/17/2014); Colonoscopy (1/2015); other surgical history (10/8/15); Upper gastrointestinal endoscopy (04/12/2017); Upper

## 2025-06-09 RX ORDER — FLUOXETINE 10 MG/1
10 CAPSULE ORAL 2 TIMES DAILY
Qty: 180 CAPSULE | Refills: 2 | Status: SHIPPED | OUTPATIENT
Start: 2025-06-09

## 2025-06-09 RX ORDER — LEVOTHYROXINE SODIUM 75 UG/1
75 TABLET ORAL EVERY MORNING
Qty: 90 TABLET | Refills: 2 | Status: SHIPPED | OUTPATIENT
Start: 2025-06-09

## 2025-06-27 ENCOUNTER — OFFICE VISIT (OUTPATIENT)
Dept: PRIMARY CARE CLINIC | Age: 60
End: 2025-06-27
Payer: COMMERCIAL

## 2025-06-27 VITALS
HEIGHT: 64 IN | OXYGEN SATURATION: 98 % | HEART RATE: 86 BPM | BODY MASS INDEX: 28.68 KG/M2 | SYSTOLIC BLOOD PRESSURE: 110 MMHG | TEMPERATURE: 97.8 F | WEIGHT: 168 LBS | DIASTOLIC BLOOD PRESSURE: 60 MMHG

## 2025-06-27 DIAGNOSIS — E78.00 PURE HYPERCHOLESTEROLEMIA: ICD-10-CM

## 2025-06-27 DIAGNOSIS — E03.8 OTHER SPECIFIED HYPOTHYROIDISM: ICD-10-CM

## 2025-06-27 DIAGNOSIS — K21.9 LARYNGOPHARYNGEAL REFLUX (LPR): ICD-10-CM

## 2025-06-27 DIAGNOSIS — R05.3 CHRONIC COUGH: Primary | ICD-10-CM

## 2025-06-27 DIAGNOSIS — R09.81 NASAL CONGESTION: ICD-10-CM

## 2025-06-27 DIAGNOSIS — R73.01 IMPAIRED FASTING GLUCOSE: ICD-10-CM

## 2025-06-27 PROCEDURE — 99214 OFFICE O/P EST MOD 30 MIN: CPT | Performed by: FAMILY MEDICINE

## 2025-06-27 RX ORDER — FLUTICASONE PROPIONATE 50 MCG
1 SPRAY, SUSPENSION (ML) NASAL DAILY
Qty: 16 G | Refills: 2 | Status: SHIPPED | OUTPATIENT
Start: 2025-06-27

## 2025-06-27 ASSESSMENT — PATIENT HEALTH QUESTIONNAIRE - PHQ9
SUM OF ALL RESPONSES TO PHQ QUESTIONS 1-9: 0
10. IF YOU CHECKED OFF ANY PROBLEMS, HOW DIFFICULT HAVE THESE PROBLEMS MADE IT FOR YOU TO DO YOUR WORK, TAKE CARE OF THINGS AT HOME, OR GET ALONG WITH OTHER PEOPLE: NOT DIFFICULT AT ALL
8. MOVING OR SPEAKING SO SLOWLY THAT OTHER PEOPLE COULD HAVE NOTICED. OR THE OPPOSITE, BEING SO FIGETY OR RESTLESS THAT YOU HAVE BEEN MOVING AROUND A LOT MORE THAN USUAL: NOT AT ALL
SUM OF ALL RESPONSES TO PHQ QUESTIONS 1-9: 0
6. FEELING BAD ABOUT YOURSELF - OR THAT YOU ARE A FAILURE OR HAVE LET YOURSELF OR YOUR FAMILY DOWN: NOT AT ALL
2. FEELING DOWN, DEPRESSED OR HOPELESS: NOT AT ALL
1. LITTLE INTEREST OR PLEASURE IN DOING THINGS: NOT AT ALL
7. TROUBLE CONCENTRATING ON THINGS, SUCH AS READING THE NEWSPAPER OR WATCHING TELEVISION: NOT AT ALL
SUM OF ALL RESPONSES TO PHQ QUESTIONS 1-9: 0
9. THOUGHTS THAT YOU WOULD BE BETTER OFF DEAD, OR OF HURTING YOURSELF: NOT AT ALL
SUM OF ALL RESPONSES TO PHQ QUESTIONS 1-9: 0
3. TROUBLE FALLING OR STAYING ASLEEP: NOT AT ALL
5. POOR APPETITE OR OVEREATING: NOT AT ALL
4. FEELING TIRED OR HAVING LITTLE ENERGY: NOT AT ALL

## 2025-06-27 ASSESSMENT — ENCOUNTER SYMPTOMS
GASTROINTESTINAL NEGATIVE: 1
EYES NEGATIVE: 1
ALLERGIC/IMMUNOLOGIC NEGATIVE: 1
RESPIRATORY NEGATIVE: 1

## 2025-06-27 NOTE — PROGRESS NOTES
Hemoglobin A1C; Future    Nasal congestion  -     CBC with Auto Differential; Future  -     Comprehensive Metabolic Panel; Future  -     T4; Future  -     Lipid Panel; Future  -     TSH; Future  -     Hemoglobin A1C; Future    Laryngopharyngeal reflux (LPR)    Other specified hypothyroidism  -     CBC with Auto Differential; Future  -     Comprehensive Metabolic Panel; Future  -     T4; Future  -     Lipid Panel; Future  -     TSH; Future  -     Hemoglobin A1C; Future    Impaired fasting glucose  -     CBC with Auto Differential; Future  -     Comprehensive Metabolic Panel; Future  -     T4; Future  -     Lipid Panel; Future  -     TSH; Future  -     Hemoglobin A1C; Future    Pure hypercholesterolemia  -     CBC with Auto Differential; Future  -     Comprehensive Metabolic Panel; Future  -     T4; Future  -     Lipid Panel; Future  -     TSH; Future  -     Hemoglobin A1C; Future    Other orders  -     fluticasone (FLONASE) 50 MCG/ACT nasal spray; 1 spray by Each Nostril route daily            Return in about 3 months (around 9/27/2025).      Kishor Kern DO    Note was generated with the assistance of voice recognition software.  Document was reviewed however may contain grammatical errors.

## 2025-07-17 ENCOUNTER — OFFICE VISIT (OUTPATIENT)
Dept: ORTHOPEDIC SURGERY | Age: 60
End: 2025-07-17

## 2025-07-17 VITALS
HEART RATE: 77 BPM | SYSTOLIC BLOOD PRESSURE: 117 MMHG | TEMPERATURE: 98.3 F | DIASTOLIC BLOOD PRESSURE: 75 MMHG | OXYGEN SATURATION: 98 %

## 2025-07-17 DIAGNOSIS — M17.12 PRIMARY OSTEOARTHRITIS OF LEFT KNEE: Primary | ICD-10-CM

## 2025-07-17 RX ORDER — BUPIVACAINE HYDROCHLORIDE 2.5 MG/ML
3 INJECTION, SOLUTION INFILTRATION; PERINEURAL ONCE
Status: COMPLETED | OUTPATIENT
Start: 2025-07-17 | End: 2025-07-17

## 2025-07-17 RX ORDER — TRIAMCINOLONE ACETONIDE 40 MG/ML
80 INJECTION, SUSPENSION INTRA-ARTICULAR; INTRAMUSCULAR ONCE
Status: COMPLETED | OUTPATIENT
Start: 2025-07-17 | End: 2025-07-17

## 2025-07-17 RX ADMIN — BUPIVACAINE HYDROCHLORIDE 7.5 MG: 2.5 INJECTION, SOLUTION INFILTRATION; PERINEURAL at 15:52

## 2025-07-17 RX ADMIN — TRIAMCINOLONE ACETONIDE 80 MG: 40 INJECTION, SUSPENSION INTRA-ARTICULAR; INTRAMUSCULAR at 15:53

## 2025-07-17 NOTE — PROGRESS NOTES
CHIEF COMPLAINT:   No chief complaint on file.     HPI update 7/17/2025: Chelsie is here today for repeat injection to the left knee. She had previous injection in February which provided her relief up until today. She feels she over did it in [place] on vacation. Denies any injuries.     HPI update 2/13/2025: Chelsie is here today for repeat injection in her left knee.  Her last injection was in May and she had long-term lasting relief until recently.  She has had no recent injuries.  She has had no recent falls.    HPI:    Chelsie Stoddard is a 60 y.o. year old female with multiple month history of left knee pain.  She has had x-rays and MRIs done by her primary care physician.  She has used Voltaren gel and 800 mg ibuprofen with some relief.  She complains of medial sided knee pain that is worse going up and down stairs.  She states she has been falling recently secondary to issues with her bifocal glasses.  She denies any mechanical symptoms.  Denies feelings of instability.  Has never had surgery on this knee.  Pain is sharp and nonradiating.  Exacerbated by activity and gets better with rest    PAST MEDICAL HISTORY  Past Medical History:   Diagnosis Date    Anemia     Anxiety     Celiac disease     Difficult intubation     Intubated successfully 1/2009 with Glidescope #3, 7.0 ETT.    Eating disorder 10 years ago    anorexia, Dr. Kern monitoring.    GERD (gastroesophageal reflux disease)     History of blood transfusion 1993    Hypothyroidism     Latex allergy, contact dermatitis     PONV (postoperative nausea and vomiting)     Screening due     TMJ (dislocation of temporomandibular joint)     Vitamin D deficiency     follows with Dr. Kern       PAST SURGICAL HISTORY  Past Surgical History:   Procedure Laterality Date    COLONOSCOPY  1/2015    COLONOSCOPY N/A 2/14/2025    COLORECTAL CANCER SCREENING, NOT HIGH RISK   +++LATEX ALLERGY+++   (HOLD TIME 1130AM) performed by Travon Bowman MD at [facility] ENDOSCOPY

## 2025-07-28 ENCOUNTER — PROCEDURE VISIT (OUTPATIENT)
Dept: ENT CLINIC | Age: 60
End: 2025-07-28
Payer: COMMERCIAL

## 2025-07-28 VITALS
OXYGEN SATURATION: 98 % | TEMPERATURE: 97.1 F | HEIGHT: 64 IN | WEIGHT: 167 LBS | HEART RATE: 73 BPM | SYSTOLIC BLOOD PRESSURE: 107 MMHG | DIASTOLIC BLOOD PRESSURE: 69 MMHG | BODY MASS INDEX: 28.51 KG/M2

## 2025-07-28 DIAGNOSIS — L74.52 FOCAL HYPERHIDROSIS DUE TO FREY SYNDROME: Primary | ICD-10-CM

## 2025-07-28 PROCEDURE — 64653 CHEMODENERV ECCRINE GLANDS: CPT | Performed by: OTOLARYNGOLOGY

## 2025-07-30 LAB
CHOLEST SERPL-MCNC: 221 MG/DL
GLUCOSE SERPL-MCNC: 88 MG/DL (ref 74–99)
HDLC SERPL-MCNC: 81 MG/DL
LDLC SERPL CALC-MCNC: 131 MG/DL
PATIENT FASTING?: YES
TRIGL SERPL-MCNC: 47 MG/DL
VLDLC SERPL CALC-MCNC: 9 MG/DL

## 2025-08-29 ENCOUNTER — HOSPITAL ENCOUNTER (OUTPATIENT)
Dept: LAB | Age: 60
Discharge: HOME | End: 2025-08-29
Payer: COMMERCIAL

## 2025-08-29 DIAGNOSIS — E78.00 PURE HYPERCHOLESTEROLEMIA: ICD-10-CM

## 2025-08-29 DIAGNOSIS — R73.01 IMPAIRED FASTING GLUCOSE: ICD-10-CM

## 2025-08-29 DIAGNOSIS — R09.81 NASAL CONGESTION: ICD-10-CM

## 2025-08-29 DIAGNOSIS — R05.3 CHRONIC COUGH: ICD-10-CM

## 2025-08-29 DIAGNOSIS — E03.8 OTHER SPECIFIED HYPOTHYROIDISM: ICD-10-CM

## 2025-08-29 LAB
ALBUMIN SERPL-MCNC: 4.3 G/DL (ref 3.5–5.2)
ALP SERPL-CCNC: 50 U/L (ref 35–104)
ALT SERPL-CCNC: 19 U/L (ref 0–35)
ANION GAP SERPL CALCULATED.3IONS-SCNC: 10 MMOL/L (ref 7–16)
AST SERPL-CCNC: 22 U/L (ref 0–35)
BASOPHILS # BLD: 0.03 K/UL (ref 0–0.2)
BASOPHILS NFR BLD: 1 % (ref 0–2)
BILIRUB SERPL-MCNC: 0.6 MG/DL (ref 0–1.2)
BUN SERPL-MCNC: 15 MG/DL (ref 8–23)
CALCIUM SERPL-MCNC: 9.6 MG/DL (ref 8.8–10.2)
CHLORIDE SERPL-SCNC: 101 MMOL/L (ref 98–107)
CHOLEST SERPL-MCNC: 217 MG/DL
CO2 SERPL-SCNC: 26 MMOL/L (ref 22–29)
CREAT SERPL-MCNC: 0.8 MG/DL (ref 0.5–1)
EOSINOPHIL # BLD: 0.1 K/UL (ref 0.05–0.5)
EOSINOPHILS RELATIVE PERCENT: 3 % (ref 0–6)
ERYTHROCYTE [DISTWIDTH] IN BLOOD BY AUTOMATED COUNT: 12.2 % (ref 11.5–15)
GFR, ESTIMATED: 83 ML/MIN/1.73M2
GLUCOSE SERPL-MCNC: 88 MG/DL (ref 74–99)
HBA1C MFR BLD: 5.9 % (ref 4–5.6)
HCT VFR BLD AUTO: 36.8 % (ref 34–48)
HDLC SERPL-MCNC: 83 MG/DL
HGB BLD-MCNC: 12.7 G/DL (ref 11.5–15.5)
IMM GRANULOCYTES # BLD AUTO: <0.03 K/UL (ref 0–0.58)
IMM GRANULOCYTES NFR BLD: 0 % (ref 0–5)
LDLC SERPL CALC-MCNC: 123 MG/DL
LYMPHOCYTES NFR BLD: 1.06 K/UL (ref 1.5–4)
LYMPHOCYTES RELATIVE PERCENT: 30 % (ref 20–42)
MCH RBC QN AUTO: 32.5 PG (ref 26–35)
MCHC RBC AUTO-ENTMCNC: 34.5 G/DL (ref 32–34.5)
MCV RBC AUTO: 94.1 FL (ref 80–99.9)
MONOCYTES NFR BLD: 0.33 K/UL (ref 0.1–0.95)
MONOCYTES NFR BLD: 9 % (ref 2–12)
NEUTROPHILS NFR BLD: 57 % (ref 43–80)
NEUTS SEG NFR BLD: 2.01 K/UL (ref 1.8–7.3)
PLATELET # BLD AUTO: 232 K/UL (ref 130–450)
PMV BLD AUTO: 8.7 FL (ref 7–12)
POTASSIUM SERPL-SCNC: 4.7 MMOL/L (ref 3.5–5.1)
PROT SERPL-MCNC: 6.7 G/DL (ref 6.4–8.3)
RBC # BLD AUTO: 3.91 M/UL (ref 3.5–5.5)
SODIUM SERPL-SCNC: 137 MMOL/L (ref 136–145)
T4 SERPL-MCNC: 8.5 UG/DL (ref 4.5–11.7)
TRIGL SERPL-MCNC: 54 MG/DL
TSH SERPL DL<=0.05 MIU/L-ACNC: 0.36 UIU/ML (ref 0.27–4.2)
VLDLC SERPL CALC-MCNC: 11 MG/DL
WBC OTHER # BLD: 3.5 K/UL (ref 4.5–11.5)

## 2025-08-29 PROCEDURE — 84436 ASSAY OF TOTAL THYROXINE: CPT

## 2025-08-29 PROCEDURE — 36415 COLL VENOUS BLD VENIPUNCTURE: CPT

## 2025-08-29 PROCEDURE — 85025 COMPLETE CBC W/AUTO DIFF WBC: CPT

## 2025-08-29 PROCEDURE — 83036 HEMOGLOBIN GLYCOSYLATED A1C: CPT

## 2025-08-29 PROCEDURE — 80061 LIPID PANEL: CPT

## 2025-08-29 PROCEDURE — 84443 ASSAY THYROID STIM HORMONE: CPT

## 2025-08-29 PROCEDURE — 80053 COMPREHEN METABOLIC PANEL: CPT

## (undated) DEVICE — Z DISCONTINUED USE 2275686 GLOVE SURG SZ 8 L12IN FNGR THK13MIL WHT ISOLEX POLYISOPRENE

## (undated) DEVICE — DRILL SYSTEM 7

## (undated) DEVICE — SPONGE GZ W4XL4IN RAYON POLY CVR W/NONWOVEN FAB STRL 2/PK

## (undated) DEVICE — PADDING,UNDERCAST,COTTON, 4"X4YD STERILE: Brand: MEDLINE

## (undated) DEVICE — DRAPE C ARM W41XL74IN UNIV MOB W RUBBERBAND CLP

## (undated) DEVICE — SPONGE GZ W4XL4IN RAYON POLY FILL CVR W/ NONWOVEN FAB

## (undated) DEVICE — BANDAGE COBAN 4 IN COMPR W4INXL5YD FOAM COHESIVE QUIK STK SELF ADH SFT

## (undated) DEVICE — DRIP REDUCTION MANIFOLD

## (undated) DEVICE — CONTROL SYRINGE LUER-LOCK TIP: Brand: MONOJECT

## (undated) DEVICE — PADDING,UNDERCAST,COTTON, 3X4YD STERILE: Brand: MEDLINE

## (undated) DEVICE — GRADUATE TRIANG MEASURE 1000ML BLK PRNT

## (undated) DEVICE — SOLUTION IV IRRIG WATER 1000ML POUR BRL 2F7114

## (undated) DEVICE — SPLINT CAST W5XL30IN GRN STRENGTH PLSTR OF PARIS FAST SET

## (undated) DEVICE — INTENDED FOR TISSUE SEPARATION, AND OTHER PROCEDURES THAT REQUIRE A SHARP SURGICAL BLADE TO PUNCTURE OR CUT.: Brand: BARD-PARKER ® STAINLESS STEEL BLADES

## (undated) DEVICE — TUBING SUCT 12FR MAL ALUM SHFT FN CAP VENT UNIV CONN W/ OBT

## (undated) DEVICE — GLOVE,SURG,SIGNATURE LTX MICR,LTX,PF,7.0: Brand: MEDLINE

## (undated) DEVICE — ZIMMER® STERILE DISPOSABLE TOURNIQUET CUFF WITH PROTECTIVE SLEEVE AND PLC, DUAL PORT, SINGLE BLADDER, 18 IN. (46 CM)

## (undated) DEVICE — DRAPE SURGICAL HAND PROX AURORA

## (undated) DEVICE — BLOCK BITE 60FR RUBBER ADLT DENTAL

## (undated) DEVICE — SURGICAL PROCEDURE PACK BASIC

## (undated) DEVICE — GAUZE,SPONGE,AVANT,4"X4",4PLY,STRL,10/TR: Brand: MEDLINE

## (undated) DEVICE — PATIENT RETURN ELECTRODE, SINGLE-USE, CONTACT QUALITY MONITORING, ADULT, WITH 9FT CORD, FOR PATIENTS WEIGING OVER 33LBS. (15KG): Brand: MEGADYNE

## (undated) DEVICE — STANDARD HYPODERMIC NEEDLE,POLYPROPYLENE HUB: Brand: MONOJECT

## (undated) DEVICE — 3M™ STERI-DRAPE™ U-DRAPE 1015: Brand: STERI-DRAPE™

## (undated) DEVICE — TOWEL,OR,DSP,ST,BLUE,DLX,10/PK,8PK/CS: Brand: MEDLINE

## (undated) DEVICE — BLADE CLIPPER GEN PURP NS

## (undated) DEVICE — PAD,ABDOMINAL,5"X9",ST,LF,25/BX: Brand: MEDLINE INDUSTRIES, INC.

## (undated) DEVICE — HAND SET

## (undated) DEVICE — CONVERTORS STOCKINETTE: Brand: CONVERTORS

## (undated) DEVICE — Z CONVERTED USE 2275207 CLOTH PREP W7.5XL7.5IN 2% CHG SKIN ALC AND RNS FREE

## (undated) DEVICE — CHLORAPREP 26ML ORANGE

## (undated) DEVICE — BIT DRL L110MM DIA1.8MM QUIK CPL CALIB W/O STP REUSE

## (undated) DEVICE — CLOTH SURG PREP PREOPERATIVE CHLORHEXIDINE GLUC 2% READYPREP

## (undated) DEVICE — SET ORTHO STD STORTSTD2

## (undated) DEVICE — BANDAGE,ELASTIC,ESMARK,STERILE,4"X9',LF: Brand: MEDLINE

## (undated) DEVICE — GLOVE ORANGE PI 8   MSG9080

## (undated) DEVICE — BNDG,ELSTC,MATRIX,STRL,3"X5YD,LF,HOOK&LP: Brand: MEDLINE

## (undated) DEVICE — 3M™ COBAN™ NL STERILE NON-LATEX SELF-ADHERENT WRAP, 2084S, 4 IN X 5 YD (10 CM X 4,5 M), 18 ROLLS/CASE: Brand: 3M™ COBAN™

## (undated) DEVICE — BIT DRL L100MM DIA2MM ST QUIK CPL NONRADIOPAQUE W/O STP

## (undated) DEVICE — SOLUTION IV IRRIG POUR BRL 0.9% SODIUM CHL 2F7124

## (undated) DEVICE — GOWN,BREATHABLE SLV,AURORA,XLG,STRL: Brand: MEDLINE

## (undated) DEVICE — DRESSING,GAUZE,XEROFORM,CURAD,5"X9",ST: Brand: CURAD

## (undated) DEVICE — FORCEPS BX OVL CUP FEN DISPOSABLE CAP L 160CM RAD JAW 4